# Patient Record
Sex: MALE | Race: WHITE | NOT HISPANIC OR LATINO | Employment: OTHER | URBAN - METROPOLITAN AREA
[De-identification: names, ages, dates, MRNs, and addresses within clinical notes are randomized per-mention and may not be internally consistent; named-entity substitution may affect disease eponyms.]

---

## 2017-01-10 ENCOUNTER — HOSPITAL ENCOUNTER (OUTPATIENT)
Dept: LAB | Facility: MEDICAL CENTER | Age: 77
End: 2017-01-10
Attending: FAMILY MEDICINE
Payer: MEDICARE

## 2017-01-10 ENCOUNTER — HOSPITAL ENCOUNTER (OUTPATIENT)
Dept: RADIOLOGY | Facility: MEDICAL CENTER | Age: 77
End: 2017-01-10
Attending: NURSE PRACTITIONER
Payer: MEDICARE

## 2017-01-10 DIAGNOSIS — R05.9 COUGH: ICD-10-CM

## 2017-01-10 LAB
ALBUMIN SERPL BCP-MCNC: 4.5 G/DL (ref 3.2–4.9)
ALBUMIN/GLOB SERPL: 1.6 G/DL
ALP SERPL-CCNC: 64 U/L (ref 30–99)
ALT SERPL-CCNC: 14 U/L (ref 2–50)
ANION GAP SERPL CALC-SCNC: 6 MMOL/L (ref 0–11.9)
AST SERPL-CCNC: 18 U/L (ref 12–45)
BASOPHILS # BLD AUTO: 0.05 K/UL (ref 0–0.12)
BASOPHILS NFR BLD AUTO: 0.9 % (ref 0–1.8)
BILIRUB SERPL-MCNC: 0.7 MG/DL (ref 0.1–1.5)
BUN SERPL-MCNC: 12 MG/DL (ref 8–22)
CALCIUM SERPL-MCNC: 9.4 MG/DL (ref 8.5–10.5)
CHLORIDE SERPL-SCNC: 104 MMOL/L (ref 96–112)
CO2 SERPL-SCNC: 28 MMOL/L (ref 20–33)
CREAT SERPL-MCNC: 0.93 MG/DL (ref 0.5–1.4)
DEPRECATED D DIMER PPP IA-ACNC: <200 NG/ML(D-DU)
EOSINOPHIL # BLD: 0.52 K/UL (ref 0–0.51)
EOSINOPHIL NFR BLD AUTO: 9.4 % (ref 0–6.9)
ERYTHROCYTE [DISTWIDTH] IN BLOOD BY AUTOMATED COUNT: 43.8 FL (ref 35.9–50)
ERYTHROCYTE [SEDIMENTATION RATE] IN BLOOD BY WESTERGREN METHOD: 3 MM/HOUR (ref 0–20)
GLOBULIN SER CALC-MCNC: 2.8 G/DL (ref 1.9–3.5)
GLUCOSE SERPL-MCNC: 135 MG/DL (ref 65–99)
HCT VFR BLD AUTO: 47.5 % (ref 42–52)
HGB BLD-MCNC: 15.9 G/DL (ref 14–18)
IMM GRANULOCYTES # BLD AUTO: 0.01 K/UL (ref 0–0.11)
IMM GRANULOCYTES NFR BLD AUTO: 0.2 % (ref 0–0.9)
LYMPHOCYTES # BLD: 1.6 K/UL (ref 1–4.8)
LYMPHOCYTES NFR BLD AUTO: 28.8 % (ref 22–41)
MCH RBC QN AUTO: 29.4 PG (ref 27–33)
MCHC RBC AUTO-ENTMCNC: 33.5 G/DL (ref 33.7–35.3)
MCV RBC AUTO: 88 FL (ref 81.4–97.8)
MONOCYTES # BLD: 0.22 K/UL (ref 0–0.85)
MONOCYTES NFR BLD AUTO: 4 % (ref 0–13.4)
NEUTROPHILS # BLD: 3.15 K/UL (ref 1.82–7.42)
NEUTROPHILS NFR BLD AUTO: 56.7 % (ref 44–72)
NRBC # BLD AUTO: 0 K/UL
NRBC BLD-RTO: 0 /100 WBC
PLATELET # BLD AUTO: 196 K/UL (ref 164–446)
PMV BLD AUTO: 12.1 FL (ref 9–12.9)
POTASSIUM SERPL-SCNC: 4.6 MMOL/L (ref 3.6–5.5)
PROT SERPL-MCNC: 7.3 G/DL (ref 6–8.2)
RBC # BLD AUTO: 5.4 M/UL (ref 4.7–6.1)
SODIUM SERPL-SCNC: 138 MMOL/L (ref 135–145)
WBC # BLD AUTO: 5.6 K/UL (ref 4.8–10.8)

## 2017-01-10 PROCEDURE — 71020 DX-CHEST-2 VIEWS: CPT

## 2017-01-10 PROCEDURE — 85025 COMPLETE CBC W/AUTO DIFF WBC: CPT

## 2017-01-10 PROCEDURE — 80053 COMPREHEN METABOLIC PANEL: CPT

## 2017-01-10 PROCEDURE — 36415 COLL VENOUS BLD VENIPUNCTURE: CPT

## 2017-01-10 PROCEDURE — 85652 RBC SED RATE AUTOMATED: CPT

## 2017-01-10 PROCEDURE — 85379 FIBRIN DEGRADATION QUANT: CPT

## 2017-03-24 DIAGNOSIS — Z01.812 PRE-OPERATIVE LABORATORY EXAMINATION: ICD-10-CM

## 2017-03-24 DIAGNOSIS — Z01.810 PRE-OPERATIVE CARDIOVASCULAR EXAMINATION: ICD-10-CM

## 2017-03-24 LAB
ANION GAP SERPL CALC-SCNC: 11 MMOL/L (ref 0–11.9)
BASOPHILS # BLD AUTO: 0.8 % (ref 0–1.8)
BASOPHILS # BLD: 0.05 K/UL (ref 0–0.12)
BUN SERPL-MCNC: 12 MG/DL (ref 8–22)
CALCIUM SERPL-MCNC: 9.8 MG/DL (ref 8.5–10.5)
CHLORIDE SERPL-SCNC: 104 MMOL/L (ref 96–112)
CO2 SERPL-SCNC: 27 MMOL/L (ref 20–33)
CREAT SERPL-MCNC: 0.97 MG/DL (ref 0.5–1.4)
EKG IMPRESSION: NORMAL
EOSINOPHIL # BLD AUTO: 0.44 K/UL (ref 0–0.51)
EOSINOPHIL NFR BLD: 7.1 % (ref 0–6.9)
ERYTHROCYTE [DISTWIDTH] IN BLOOD BY AUTOMATED COUNT: 41.9 FL (ref 35.9–50)
GFR SERPL CREATININE-BSD FRML MDRD: >60 ML/MIN/1.73 M 2
GLUCOSE SERPL-MCNC: 139 MG/DL (ref 65–99)
HCT VFR BLD AUTO: 47.9 % (ref 42–52)
HGB BLD-MCNC: 15.7 G/DL (ref 14–18)
IMM GRANULOCYTES # BLD AUTO: 0.01 K/UL (ref 0–0.11)
IMM GRANULOCYTES NFR BLD AUTO: 0.2 % (ref 0–0.9)
LYMPHOCYTES # BLD AUTO: 1.8 K/UL (ref 1–4.8)
LYMPHOCYTES NFR BLD: 28.8 % (ref 22–41)
MCH RBC QN AUTO: 28.5 PG (ref 27–33)
MCHC RBC AUTO-ENTMCNC: 32.8 G/DL (ref 33.7–35.3)
MCV RBC AUTO: 87.1 FL (ref 81.4–97.8)
MONOCYTES # BLD AUTO: 0.31 K/UL (ref 0–0.85)
MONOCYTES NFR BLD AUTO: 5 % (ref 0–13.4)
NEUTROPHILS # BLD AUTO: 3.63 K/UL (ref 1.82–7.42)
NEUTROPHILS NFR BLD: 58.1 % (ref 44–72)
NRBC # BLD AUTO: 0 K/UL
NRBC BLD AUTO-RTO: 0 /100 WBC
PLATELET # BLD AUTO: 208 K/UL (ref 164–446)
PMV BLD AUTO: 12.3 FL (ref 9–12.9)
POTASSIUM SERPL-SCNC: 4.5 MMOL/L (ref 3.6–5.5)
RBC # BLD AUTO: 5.5 M/UL (ref 4.7–6.1)
SODIUM SERPL-SCNC: 142 MMOL/L (ref 135–145)
WBC # BLD AUTO: 6.2 K/UL (ref 4.8–10.8)

## 2017-03-24 PROCEDURE — 80048 BASIC METABOLIC PNL TOTAL CA: CPT

## 2017-03-24 PROCEDURE — 85025 COMPLETE CBC W/AUTO DIFF WBC: CPT

## 2017-03-24 PROCEDURE — 36415 COLL VENOUS BLD VENIPUNCTURE: CPT

## 2017-03-31 ENCOUNTER — HOSPITAL ENCOUNTER (OUTPATIENT)
Facility: MEDICAL CENTER | Age: 77
End: 2017-03-31
Attending: SURGERY | Admitting: SURGERY
Payer: MEDICARE

## 2017-03-31 VITALS
BODY MASS INDEX: 30.05 KG/M2 | RESPIRATION RATE: 16 BRPM | OXYGEN SATURATION: 92 % | HEART RATE: 67 BPM | WEIGHT: 209.88 LBS | DIASTOLIC BLOOD PRESSURE: 84 MMHG | TEMPERATURE: 98 F | SYSTOLIC BLOOD PRESSURE: 147 MMHG | HEIGHT: 70 IN

## 2017-03-31 PROBLEM — K40.20 BILATERAL INGUINAL HERNIA: Status: RESOLVED | Noted: 2017-03-31 | Resolved: 2017-03-31

## 2017-03-31 PROBLEM — K40.20 BILATERAL INGUINAL HERNIA: Status: ACTIVE | Noted: 2017-03-31

## 2017-03-31 PROCEDURE — 700111 HCHG RX REV CODE 636 W/ 250 OVERRIDE (IP)

## 2017-03-31 PROCEDURE — 160028 HCHG SURGERY MINUTES - 1ST 30 MINS LEVEL 3: Performed by: SURGERY

## 2017-03-31 PROCEDURE — 160036 HCHG PACU - EA ADDL 30 MINS PHASE I: Performed by: SURGERY

## 2017-03-31 PROCEDURE — 700101 HCHG RX REV CODE 250

## 2017-03-31 PROCEDURE — 160009 HCHG ANES TIME/MIN: Performed by: SURGERY

## 2017-03-31 PROCEDURE — 110382 HCHG SHELL REV 271: Performed by: SURGERY

## 2017-03-31 PROCEDURE — 160002 HCHG RECOVERY MINUTES (STAT): Performed by: SURGERY

## 2017-03-31 PROCEDURE — A6402 STERILE GAUZE <= 16 SQ IN: HCPCS | Performed by: SURGERY

## 2017-03-31 PROCEDURE — 500888 HCHG PACK, MINOR BASIN: Performed by: SURGERY

## 2017-03-31 PROCEDURE — 160039 HCHG SURGERY MINUTES - EA ADDL 1 MIN LEVEL 3: Performed by: SURGERY

## 2017-03-31 PROCEDURE — C1781 MESH (IMPLANTABLE): HCPCS | Performed by: SURGERY

## 2017-03-31 PROCEDURE — 160048 HCHG OR STATISTICAL LEVEL 1-5: Performed by: SURGERY

## 2017-03-31 PROCEDURE — 500380 HCHG DRAIN, PENROSE 1/4X12: Performed by: SURGERY

## 2017-03-31 PROCEDURE — 110371 HCHG SHELL REV 272: Performed by: SURGERY

## 2017-03-31 PROCEDURE — A4606 OXYGEN PROBE USED W OXIMETER: HCPCS | Performed by: SURGERY

## 2017-03-31 PROCEDURE — 501838 HCHG SUTURE GENERAL: Performed by: SURGERY

## 2017-03-31 PROCEDURE — 160035 HCHG PACU - 1ST 60 MINS PHASE I: Performed by: SURGERY

## 2017-03-31 PROCEDURE — 502240 HCHG MISC OR SUPPLY RC 0272: Performed by: SURGERY

## 2017-03-31 DEVICE — MESH VENTRALEX ST W/STRAP - 4.3CM SMALL (1EA/CA): Type: IMPLANTABLE DEVICE | Site: UMBILICAL | Status: FUNCTIONAL

## 2017-03-31 DEVICE — MESH SOFT 4 X 6 (3/CA): Type: IMPLANTABLE DEVICE | Site: INGUINAL | Status: FUNCTIONAL

## 2017-03-31 RX ORDER — HYDRALAZINE HYDROCHLORIDE 20 MG/ML
INJECTION INTRAMUSCULAR; INTRAVENOUS
Status: COMPLETED
Start: 2017-03-31 | End: 2017-03-31

## 2017-03-31 RX ORDER — SODIUM CHLORIDE, SODIUM LACTATE, POTASSIUM CHLORIDE, CALCIUM CHLORIDE 600; 310; 30; 20 MG/100ML; MG/100ML; MG/100ML; MG/100ML
1000 INJECTION, SOLUTION INTRAVENOUS
Status: COMPLETED | OUTPATIENT
Start: 2017-03-31 | End: 2017-03-31

## 2017-03-31 RX ORDER — MORPHINE SULFATE 4 MG/ML
2-4 INJECTION, SOLUTION INTRAMUSCULAR; INTRAVENOUS
Status: DISCONTINUED | OUTPATIENT
Start: 2017-03-31 | End: 2017-03-31 | Stop reason: HOSPADM

## 2017-03-31 RX ORDER — OXYCODONE HYDROCHLORIDE AND ACETAMINOPHEN 5; 325 MG/1; MG/1
1-2 TABLET ORAL EVERY 4 HOURS PRN
Status: DISCONTINUED | OUTPATIENT
Start: 2017-03-31 | End: 2017-03-31 | Stop reason: HOSPADM

## 2017-03-31 RX ORDER — ONDANSETRON 2 MG/ML
2-4 INJECTION INTRAMUSCULAR; INTRAVENOUS PRN
Status: DISCONTINUED | OUTPATIENT
Start: 2017-03-31 | End: 2017-03-31 | Stop reason: HOSPADM

## 2017-03-31 RX ORDER — DEXTROSE MONOHYDRATE, SODIUM CHLORIDE, AND POTASSIUM CHLORIDE 50; 1.49; 4.5 G/1000ML; G/1000ML; G/1000ML
INJECTION, SOLUTION INTRAVENOUS CONTINUOUS
Status: DISCONTINUED | OUTPATIENT
Start: 2017-03-31 | End: 2017-03-31 | Stop reason: HOSPADM

## 2017-03-31 RX ORDER — BUPIVACAINE HYDROCHLORIDE AND EPINEPHRINE 5; 5 MG/ML; UG/ML
INJECTION, SOLUTION EPIDURAL; INTRACAUDAL; PERINEURAL
Status: DISCONTINUED | OUTPATIENT
Start: 2017-03-31 | End: 2017-03-31 | Stop reason: HOSPADM

## 2017-03-31 RX ADMIN — SODIUM CHLORIDE, SODIUM LACTATE, POTASSIUM CHLORIDE, CALCIUM CHLORIDE 1000 ML: 600; 310; 30; 20 INJECTION, SOLUTION INTRAVENOUS at 11:00

## 2017-03-31 RX ADMIN — HYDRALAZINE HYDROCHLORIDE 10 MG: 20 INJECTION INTRAMUSCULAR; INTRAVENOUS at 14:55

## 2017-03-31 ASSESSMENT — PAIN SCALES - GENERAL
PAINLEVEL_OUTOF10: 0
PAINLEVEL_OUTOF10: 0
PAINLEVEL_OUTOF10: 2
PAINLEVEL_OUTOF10: 0
PAINLEVEL_OUTOF10: 2

## 2017-03-31 NOTE — IP AVS SNAPSHOT
3/31/2017          Hamlet Del Cid  [address]  [facility] [address]    Dear Hamlet:    [facility] wants to ensure your discharge home is safe and you or your loved ones have had all your questions answered regarding your care after you leave the hospital.    You may receive a telephone call within two days of your discharge.  This call is to make certain you understand your discharge instructions as well as ensure we provided you with the best care possible during your stay with us.     The call will only last approximately 3-5 minutes and will be done by a nurse.    Once again, we want to ensure your discharge home is safe and that you have a clear understanding of any next steps in your care.  If you have any questions or concerns, please do not hesitate to contact us, we are here for you.  Thank you for choosing [facility] for your healthcare needs.    Sincerely,    Augustine Pack    [facility]

## 2017-03-31 NOTE — OR NURSING
1346 Pt arrived from OR. Report received from anesthesia, Dr Barkley. Lung sounds clear. Vitals taken and stable. No distress noted.  3 surgical sites noted with tegaderm to groin sites and gauze and tegaderm to umbilical area. Pt still sleeping.   1404 Pt resting with eyes closed. Appears comfortable Report off to Brooke CURIEL,

## 2017-03-31 NOTE — OR NURSING
1404  RECEIVED REPORT FROM CONCEPCION CURIEL.  ASSUMED CARE OF PATIENT.  PATIENT SLEEPING.  LEFT HAND TREMORS NOTED.      1415  PATIENT AWAKE.  DENIES PAIN.      1422  SPOUSE IRA CALLED AND UPDATED.    1451  DR. BOWLING CALLED.  NOTIFIED OF /93 /90.  ORDER FOR HYDRALAZINE 10 MG IV.    1455  MEDICATED WITH IV HYDRALAZINE FOR /90.    1515  SITTING IN RECLINER.    1526  WIFE IRA TO BEDSIDE.    1551  DISCHARGED.  DISCHARGE INSTRUCTIONS GIVEN TO PATIENT AND HIS WIFE.  A VERBAL UNDERSTANDING OF ALL INSTRUCTIONS WAS STATED.  PATIENT TAKING PO, VOIDING AND AMBULATING WITHOUT DIFFICULTY.  DRESSINGS TO ABDOMEN REMAIN UNCHANGED.  PATIENT STATES HE IS READY TO GO HOME.

## 2017-03-31 NOTE — IP AVS SNAPSHOT
" Home Care Instructions                                                                                                                Name:Hamlet Del Cid  Medical Record Number:[id number]  CSN: [id number]    YOB: 1940   Age: 76 y.o.  Sex: male  HT:1.778 m (5' 10\") WT: 95.2 kg (209 lb 14.1 oz)          Admit Date: 3/31/2017     Discharge Date:   Today's Date: 3/31/2017  Attending Doctor:  Michael Malin M.D.                  Allergies:  Cat hair extract                Discharge Instructions         ACTIVITY: Rest and take it easy for the first 24 hours.  A responsible adult is recommended to remain with you during that time.  It is normal to feel sleepy.  We encourage you to not do anything that requires balance, judgment or coordination.    MILD FLU-LIKE SYMPTOMS ARE NORMAL. YOU MAY EXPERIENCE GENERALIZED MUSCLE ACHES, THROAT IRRITATION, HEADACHE AND/OR SOME NAUSEA.    FOR 24 HOURS DO NOT:  Drive, operate machinery or run household appliances.  Drink beer or alcoholic beverages.   Make important decisions or sign legal documents.    SPECIAL INSTRUCTIONS: *   Activity:no lifting weight greater than 10 lbs x 6 weeks   Diet:clear liquids tonight then advance as tolerated in AM No driving for one week or while on pain medications   *SEE HERNIA INSTRUCTION SHEET*    DIET: To avoid nausea, slowly advance diet as tolerated, avoiding spicy or greasy foods for the first day.  Add more substantial food to your diet according to your physician's instructions.  Babies can be fed formula or breast milk as soon as they are hungry.  INCREASE FLUIDS AND FIBER TO AVOID CONSTIPATION.    SURGICAL DRESSING/BATHING: *MAY SHOWER TOMORROW.  SHOWERS ONLY FOR 2 WEEKS  REMOVE TEGADERM DRESSINGS IN 4 DAYS**    FOLLOW-UP APPOINTMENT:  A follow-up appointment should be arranged with your doctor in *7-10 DAYS**; call to schedule.    You should CALL YOUR PHYSICIAN if you develop:  Fever greater than 101 degrees F.  Pain not relieved " by medication, or persistent nausea or vomiting.  Excessive bleeding (blood soaking through dressing) or unexpected drainage from the wound.  Extreme redness or swelling around the incision site, drainage of pus or foul smelling drainage.  Inability to urinate or empty your bladder within 8 hours.  Problems with breathing or chest pain.    You should call 911 if you develop problems with breathing or chest pain.  If you are unable to contact your doctor or surgical center, you should go to the nearest emergency room or urgent care center.  Physician's telephone #: *DR. ZELAYA [phone]**    If any questions arise, call your doctor.  If your doctor is not available, please feel free to call the Surgical Center at [phone].  The Center is open Monday through Friday from 7AM to 7PM.  You can also call the [facility] HOTLINE open 24 hours/day, 7 days/week and speak to a nurse at [phone], or toll free at [phone].    A registered nurse may call you a few days after your surgery to see how you are doing after your procedure.    MEDICATIONS: Resume taking daily medication.  Take prescribed pain medication with food.  If no medication is prescribed, you may take non-aspirin pain medication if needed.  PAIN MEDICATION CAN BE VERY CONSTIPATING.  Take a stool softener or laxative such as senokot, pericolace, or milk of magnesia if needed.    Prescription given for *PERCOCET AND ZOFRAN**.  Last pain medication given at *____________________**.    If your physician has prescribed pain medication that includes Acetaminophen (Tylenol), do not take additional Acetaminophen (Tylenol) while taking the prescribed medication.    Depression / Suicide Risk    As you are discharged from this [facility] facility, it is important to learn how to keep safe from harming yourself.    Recognize the warning signs:  · Abrupt changes in personality, positive or negative- including increase in energy   · Giving away  possessions  · Change in eating patterns- significant weight changes-  positive or negative  · Change in sleeping patterns- unable to sleep or sleeping all the time   · Unwillingness or inability to communicate  · Depression  · Unusual sadness, discouragement and loneliness  · Talk of wanting to die  · Neglect of personal appearance   · Rebelliousness- reckless behavior  · Withdrawal from people/activities they love  · Confusion- inability to concentrate     If you or a loved one observes any of these behaviors or has concerns about self-harm, here's what you can do:  · Talk about it- your feelings and reasons for harming yourself  · Remove any means that you might use to hurt yourself (examples: pills, rope, extension cords, firearm)  · Get professional help from the community (Mental Health, Substance Abuse, psychological counseling)  · Do not be alone:Call your Safe Contact- someone whom you trust who will be there for you.  · Call your local CRISIS HOTLINE 153-0325 or 025-936-7991  · Call your local Children's Mobile Crisis Response Team [place] (853) 489-3227 or wwwSurveySnap  · Call the toll free National Suicide Prevention Hotlines   · National Suicide Prevention Lifeline 267-741-BICQ (0734)  · National Hope Line Network 800-SUICIDE (393-8513)       Medication List      Notice     You have not been prescribed any medications.            Medication Information     Above and/or attached are the medications your physician expects you to take upon discharge. Review all of your home medications and newly ordered medications with your doctor and/or pharmacist. Follow medication instructions as directed by your doctor and/or pharmacist. Please keep your medication list with you and share with your physician. Update the information when medications are discontinued, doses are changed, or new medications (including over-the-counter products) are added; and carry medication information at all times in the  event of emergency situations.        Resources     Quit Smoking / Tobacco Use:    I understand the use of any tobacco products increases my chance of suffering from future heart disease or stroke and could cause other illnesses which may shorten my life. Quitting the use of tobacco products is the single most important thing I can do to improve my health. For further information on smoking / tobacco cessation call a Toll Free Quit Line at 1-447.548.9223 (*[employer]) or 1-453.861.1918 (American Lung Association) or you can access the web based program at www.lungusa.org.    [place] Tobacco Users Help Line:  (620) 486-6421       Toll Free: 1-475.351.1227  Quit Tobacco Program [facility] (192)501-3489    Crisis Hotline:    [place] Crisis Hotline:  3-354-CMCCVUL or 1-348.494.2662    [place] Crisis Hotline:    1-350.216.7649 or 076-698-5781    Discharge Survey:   Thank you for choosing [facility]. We hope we did everything we could to make your hospital stay a pleasant one. You may be receiving a survey and we would appreciate your time and participation in answering the questions. Your input is very valuable to us in our efforts to improve our service to our patients and their families.            Signatures     My signature on this form indicates that:    1. I acknowledge receipt and understanding of these Home Care Instruction.  2. My questions regarding this information have been answered to my satisfaction.  3. I have formulated a plan with my discharge nurse to obtain my prescribed medications for home.    __________________________________      __________________________________                   Patient Signature                                 Guardian/Responsible Adult Signature      __________________________________                 __________       ________                       Nurse Signature                                               Date                 Time

## 2017-03-31 NOTE — OR SURGEON
Immediate Post-Operative Note      PreOp Diagnosis: Bilateral Inguinal and Umbilical Hernias    PostOp Diagnosis: Bilateral Direct Inguinal Hernias and 2 cm Umbilical hernia    Procedure(s):  Bilateral INGUINAL HERNIA REPAIR W/Bard soft 6x4 MESH - Wound Class: Clean  UMBILICAL HERNIA REPAIR W/ 4.3 cm Ventralex ST mesh - Wound Class: Clean    Surgeon(s):  Michael Malin M.D.    Anesthesiologist/Type of Anesthesia:GET  Anesthesiologist: Adiel Barkley M.D./General    Surgical Staff:  Circulator: Miesha Domínguez R.N.  Scrub Person: Gayla Paniagua    Specimen: None    Estimated Blood Loss: 5 cc    Findings: bilateral direct inguinal hernias and preperitoneal adipose tissue Umbilical hernia    Complications: none        3/31/2017 1:50 PM Michael Malin

## 2017-03-31 NOTE — DISCHARGE INSTRUCTIONS
ACTIVITY: Rest and take it easy for the first 24 hours.  A responsible adult is recommended to remain with you during that time.  It is normal to feel sleepy.  We encourage you to not do anything that requires balance, judgment or coordination.    MILD FLU-LIKE SYMPTOMS ARE NORMAL. YOU MAY EXPERIENCE GENERALIZED MUSCLE ACHES, THROAT IRRITATION, HEADACHE AND/OR SOME NAUSEA.    FOR 24 HOURS DO NOT:  Drive, operate machinery or run household appliances.  Drink beer or alcoholic beverages.   Make important decisions or sign legal documents.    SPECIAL INSTRUCTIONS: *   Activity:no lifting weight greater than 10 lbs x 6 weeks   Diet:clear liquids tonight then advance as tolerated in AM No driving for one week or while on pain medications   *SEE HERNIA INSTRUCTION SHEET*    DIET: To avoid nausea, slowly advance diet as tolerated, avoiding spicy or greasy foods for the first day.  Add more substantial food to your diet according to your physician's instructions.  Babies can be fed formula or breast milk as soon as they are hungry.  INCREASE FLUIDS AND FIBER TO AVOID CONSTIPATION.    SURGICAL DRESSING/BATHING: *MAY SHOWER TOMORROW.  SHOWERS ONLY FOR 2 WEEKS  REMOVE TEGADERM DRESSINGS IN 4 DAYS**    FOLLOW-UP APPOINTMENT:  A follow-up appointment should be arranged with your doctor in *7-10 DAYS**; call to schedule.    You should CALL YOUR PHYSICIAN if you develop:  Fever greater than 101 degrees F.  Pain not relieved by medication, or persistent nausea or vomiting.  Excessive bleeding (blood soaking through dressing) or unexpected drainage from the wound.  Extreme redness or swelling around the incision site, drainage of pus or foul smelling drainage.  Inability to urinate or empty your bladder within 8 hours.  Problems with breathing or chest pain.    You should call 911 if you develop problems with breathing or chest pain.  If you are unable to contact your doctor or surgical center, you should go to the nearest  emergency room or urgent care center.  Physician's telephone #: *DR. ZELAYA [phone]**    If any questions arise, call your doctor.  If your doctor is not available, please feel free to call the Surgical Center at [phone].  The Center is open Monday through Friday from 7AM to 7PM.  You can also call the HEALTH HOTLINE open 24 hours/day, 7 days/week and speak to a nurse at [phone], or toll free at [phone].    A registered nurse may call you a few days after your surgery to see how you are doing after your procedure.    MEDICATIONS: Resume taking daily medication.  Take prescribed pain medication with food.  If no medication is prescribed, you may take non-aspirin pain medication if needed.  PAIN MEDICATION CAN BE VERY CONSTIPATING.  Take a stool softener or laxative such as senokot, pericolace, or milk of magnesia if needed.    Prescription given for *PERCOCET AND ZOFRAN**.  Last pain medication given at *____________________**.    If your physician has prescribed pain medication that includes Acetaminophen (Tylenol), do not take additional Acetaminophen (Tylenol) while taking the prescribed medication.    Depression / Suicide Risk    As you are discharged from this [facility] facility, it is important to learn how to keep safe from harming yourself.    Recognize the warning signs:  · Abrupt changes in personality, positive or negative- including increase in energy   · Giving away possessions  · Change in eating patterns- significant weight changes-  positive or negative  · Change in sleeping patterns- unable to sleep or sleeping all the time   · Unwillingness or inability to communicate  · Depression  · Unusual sadness, discouragement and loneliness  · Talk of wanting to die  · Neglect of personal appearance   · Rebelliousness- reckless behavior  · Withdrawal from people/activities they love  · Confusion- inability to concentrate     If you or a loved one observes any of these behaviors or has  concerns about self-harm, here's what you can do:  · Talk about it- your feelings and reasons for harming yourself  · Remove any means that you might use to hurt yourself (examples: pills, rope, extension cords, firearm)  · Get professional help from the community (Mental Health, Substance Abuse, psychological counseling)  · Do not be alone:Call your Safe Contact- someone whom you trust who will be there for you.  · Call your local CRISIS HOTLINE 908-2977 or 519-717-2363  · Call your local Children's Mobile Crisis Response Team [place] (519) 569-0716 or www."[employer]  · Call the toll free National Suicide Prevention Hotlines   · National Suicide Prevention Lifeline 448-395-FLME (1884)  · National Hope Line Network 800-SUICIDE (612-8539)

## 2017-04-01 NOTE — OP REPORT
DATE OF SERVICE:  03/31/2017    PREOPERATIVE DIAGNOSES:  Bilateral inguinal hernias and umbilical hernia.    POSTOPERATIVE DIAGNOSIS:  Bilateral direct inguinal hernias and 2 cm   preperitoneal fat umbilical hernia defect.    TYPE OF ANESTHESIA AND ANESTHESIOLOGIST:  General endotracheal by Dr. Barkley.    SURGEON:  Michael Malin MD.    INDICATIONS:  Patient with symptomatic bilateral inguinal hernias and   umbilical hernia.    OPERATIVE FINDINGS:  Bilateral direct inguinal hernias and a 2 cm fascial   defect of the umbilicus.    OPERATIVE NOTE:  The patient was taken to the operating room, placed in supine   position.  Starting on the right side, the right inguinal canal was   anesthetized with 0.5% Marcaine with epinephrine and then an oblique incision   was made through the anesthetized area, carried down through the skin and   subcutaneous tissue.  The external oblique fascia was exposed, opened in   direction of its fibers.  The shelving edges were developed bluntly.  The   spermatic cord was brought up in the operative field and digitally   manipulated, and then surrounded with Penrose drains, skeletonized.  No   indirect sac was encountered.  There was a direct inguinal hernia that was   reduced.  The transversalis was reapproximated with interrupted 3-0 Vicryls to   keep the sac out of the way while the 4x6 piece of Bard soft mesh was placed   into the inguinal canal cut to the appropriate size and shape, secured to the   pubic tubercle with a horizontal mattress suture, running it laterally along   the ilioinguinal ligament, making a transition onto the internal oblique where   it was tied.  The tails were sutured together with interrupted 2-0 PDS and   then starting around the midpoint on the medial edge the mesh was secured back   to the fascia with a running 2-0 PDS where it was tied back at the pubic   tubercle.  There was adequate room for the cord to go through the mesh.  The   external oblique fascia  was closed back over the cord and mesh with a running   2-0 Vicryl.  The subcutaneous tissues reapproximated with 3-0 Vicryls and the   skin was closed with a 4-0 Vicryl subcuticular closure.    A mirror incision was made on the left side in the same manner after the skin   had been anesthetized with 0.5% Marcaine with epinephrine.  The incision was   carried down through the skin and subcutaneous tissue.  The external oblique   fascia was again opened in the direction of its fibers.  The shelving edges   were developed bluntly.  The spermatic cord was digitally manipulated and   brought up in the operative field.  Again, there was a direct inguinal hernia.    There was no indirect hernia.  The direct hernia was reduced.  The   transversalis was again reapproximated with 3-0 Vicryl pops to keep the hernia   from bulging into the operative field while the definitive repair was   conducted again by placing a 6.6 x 4 piece of Bard soft mesh in the inguinal   canal, securing it into place at the pubic tubercle running it laterally along   the ilioinguinal ligament, making transition stitch onto the internal oblique   where it was tied.  The tails were sutured together with an interrupted 2-0   PDS suture.  A new running 2-0 PDS suture was started at the midpoint of the   medial edge of the mesh and carried back down to the pubic tubercle where it   was sewn and tied back to the original stitch.  There was again adequate room   for the cord.  The external oblique fascia was closed back over the cord and   mesh with a running 2-0 Vicryl.  The subcutaneous tissues repair   reapproximated with 3-0 Vicryls and the skin was closed with 4-0 Vicryl   subcuticular closure.    The umbilical hernia was repaired by making a small incision infraumbilically   in a transverse fashion and carrying it down through the skin and subcutaneous   tissue.  The fascia of the abdominal wall was encountered.  The umbilical   stalk was amputated  at the base of the fascia and dissected out.  The hernia   was reduced and there was an approximately 2 cm defect containing   preperitoneal fat.  This was reduced.  The tear in the peritoneum was repaired   with 3-0 Vicryls and then a pocket was created in the preperitoneal space and   a piece of mesh, 4.3 cm was placed into the pocket secured in four points   with a horizontal mattress stitch with 0 Prolenes and then the fascia was   closed back over the mesh with interrupted 0 Prolenes.  The umbilical stalk   was tacked back down to the fascia with a 3-0 Vicryl.  The subcutaneous   tissues reapproximated with 3-0 Vicryls and the skin was closed with a 4-0   Vicryl subcuticular closure.  Patient tolerated the procedure well.  There   were no apparent complications.  Lap, sponge, and instrument counts were   correct.       ____________________________________     MD CARLOS GEIGER / DAVE    DD:  03/31/2017 14:01:35  DT:  03/31/2017 17:41:49    D#:  [id number]  Job#:  [id number]

## 2017-10-05 PROCEDURE — 99285 EMERGENCY DEPT VISIT HI MDM: CPT

## 2017-10-05 ASSESSMENT — PAIN SCALES - GENERAL: PAINLEVEL_OUTOF10: 0

## 2017-10-06 ENCOUNTER — APPOINTMENT (OUTPATIENT)
Dept: RADIOLOGY | Facility: MEDICAL CENTER | Age: 77
End: 2017-10-06
Attending: EMERGENCY MEDICINE
Payer: MEDICARE

## 2017-10-06 ENCOUNTER — HOSPITAL ENCOUNTER (EMERGENCY)
Facility: MEDICAL CENTER | Age: 77
End: 2017-10-06
Attending: EMERGENCY MEDICINE
Payer: MEDICARE

## 2017-10-06 VITALS
HEIGHT: 71 IN | WEIGHT: 206.35 LBS | SYSTOLIC BLOOD PRESSURE: 139 MMHG | RESPIRATION RATE: 17 BRPM | DIASTOLIC BLOOD PRESSURE: 79 MMHG | TEMPERATURE: 98.3 F | BODY MASS INDEX: 28.89 KG/M2 | HEART RATE: 72 BPM | OXYGEN SATURATION: 94 %

## 2017-10-06 DIAGNOSIS — I48.0 PAF (PAROXYSMAL ATRIAL FIBRILLATION) (HCC): ICD-10-CM

## 2017-10-06 LAB
ALBUMIN SERPL BCP-MCNC: 4 G/DL (ref 3.2–4.9)
ALBUMIN/GLOB SERPL: 1.5 G/DL
ALP SERPL-CCNC: 91 U/L (ref 30–99)
ALT SERPL-CCNC: 20 U/L (ref 2–50)
ANION GAP SERPL CALC-SCNC: 8 MMOL/L (ref 0–11.9)
APTT PPP: 27.5 SEC (ref 24.7–36)
AST SERPL-CCNC: 24 U/L (ref 12–45)
BASOPHILS # BLD AUTO: 0.8 % (ref 0–1.8)
BASOPHILS # BLD: 0.05 K/UL (ref 0–0.12)
BILIRUB SERPL-MCNC: 0.8 MG/DL (ref 0.1–1.5)
BUN SERPL-MCNC: 11 MG/DL (ref 8–22)
CALCIUM SERPL-MCNC: 9.3 MG/DL (ref 8.4–10.2)
CHLORIDE SERPL-SCNC: 106 MMOL/L (ref 96–112)
CO2 SERPL-SCNC: 24 MMOL/L (ref 20–33)
CREAT SERPL-MCNC: 0.91 MG/DL (ref 0.5–1.4)
EKG IMPRESSION: NORMAL
EKG IMPRESSION: NORMAL
EOSINOPHIL # BLD AUTO: 0.45 K/UL (ref 0–0.51)
EOSINOPHIL NFR BLD: 6.8 % (ref 0–6.9)
ERYTHROCYTE [DISTWIDTH] IN BLOOD BY AUTOMATED COUNT: 42.5 FL (ref 35.9–50)
GFR SERPL CREATININE-BSD FRML MDRD: >60 ML/MIN/1.73 M 2
GLOBULIN SER CALC-MCNC: 2.7 G/DL (ref 1.9–3.5)
GLUCOSE SERPL-MCNC: 159 MG/DL (ref 65–99)
HCT VFR BLD AUTO: 46.7 % (ref 42–52)
HGB BLD-MCNC: 15.8 G/DL (ref 14–18)
IMM GRANULOCYTES # BLD AUTO: 0.02 K/UL (ref 0–0.11)
IMM GRANULOCYTES NFR BLD AUTO: 0.3 % (ref 0–0.9)
INR PPP: 0.91 (ref 0.87–1.13)
LYMPHOCYTES # BLD AUTO: 2.05 K/UL (ref 1–4.8)
LYMPHOCYTES NFR BLD: 30.9 % (ref 22–41)
MAGNESIUM SERPL-MCNC: 2.2 MG/DL (ref 1.5–2.5)
MCH RBC QN AUTO: 29 PG (ref 27–33)
MCHC RBC AUTO-ENTMCNC: 33.8 G/DL (ref 33.7–35.3)
MCV RBC AUTO: 85.8 FL (ref 81.4–97.8)
MONOCYTES # BLD AUTO: 0.47 K/UL (ref 0–0.85)
MONOCYTES NFR BLD AUTO: 7.1 % (ref 0–13.4)
NEUTROPHILS # BLD AUTO: 3.59 K/UL (ref 1.82–7.42)
NEUTROPHILS NFR BLD: 54.1 % (ref 44–72)
NRBC # BLD AUTO: 0 K/UL
NRBC BLD AUTO-RTO: 0 /100 WBC
PLATELET # BLD AUTO: 163 K/UL (ref 164–446)
PMV BLD AUTO: 11 FL (ref 9–12.9)
POTASSIUM SERPL-SCNC: 4.3 MMOL/L (ref 3.6–5.5)
PROT SERPL-MCNC: 6.7 G/DL (ref 6–8.2)
PROTHROMBIN TIME: 12.1 SEC (ref 12–14.6)
RBC # BLD AUTO: 5.44 M/UL (ref 4.7–6.1)
SODIUM SERPL-SCNC: 138 MMOL/L (ref 135–145)
T4 FREE SERPL-MCNC: 0.7 NG/DL (ref 0.58–1.64)
TSH SERPL DL<=0.005 MIU/L-ACNC: 2.64 UIU/ML (ref 0.35–5.5)
WBC # BLD AUTO: 6.6 K/UL (ref 4.8–10.8)

## 2017-10-06 PROCEDURE — 96374 THER/PROPH/DIAG INJ IV PUSH: CPT | Mod: XU

## 2017-10-06 PROCEDURE — 700102 HCHG RX REV CODE 250 W/ 637 OVERRIDE(OP): Performed by: EMERGENCY MEDICINE

## 2017-10-06 PROCEDURE — 85025 COMPLETE CBC W/AUTO DIFF WBC: CPT

## 2017-10-06 PROCEDURE — 93005 ELECTROCARDIOGRAM TRACING: CPT | Mod: XU | Performed by: EMERGENCY MEDICINE

## 2017-10-06 PROCEDURE — 85610 PROTHROMBIN TIME: CPT

## 2017-10-06 PROCEDURE — 36415 COLL VENOUS BLD VENIPUNCTURE: CPT

## 2017-10-06 PROCEDURE — 84443 ASSAY THYROID STIM HORMONE: CPT

## 2017-10-06 PROCEDURE — 83735 ASSAY OF MAGNESIUM: CPT

## 2017-10-06 PROCEDURE — 80053 COMPREHEN METABOLIC PANEL: CPT

## 2017-10-06 PROCEDURE — 84439 ASSAY OF FREE THYROXINE: CPT

## 2017-10-06 PROCEDURE — 99152 MOD SED SAME PHYS/QHP 5/>YRS: CPT

## 2017-10-06 PROCEDURE — 700111 HCHG RX REV CODE 636 W/ 250 OVERRIDE (IP): Performed by: EMERGENCY MEDICINE

## 2017-10-06 PROCEDURE — 85730 THROMBOPLASTIN TIME PARTIAL: CPT

## 2017-10-06 PROCEDURE — A9270 NON-COVERED ITEM OR SERVICE: HCPCS | Performed by: EMERGENCY MEDICINE

## 2017-10-06 PROCEDURE — 92960 CARDIOVERSION ELECTRIC EXT: CPT

## 2017-10-06 PROCEDURE — 700105 HCHG RX REV CODE 258: Performed by: EMERGENCY MEDICINE

## 2017-10-06 PROCEDURE — 304562 HCHG STAT O2 MASK/CANNULA

## 2017-10-06 PROCEDURE — 304561 HCHG STAT O2

## 2017-10-06 PROCEDURE — 71010 DX-CHEST-PORTABLE (1 VIEW): CPT

## 2017-10-06 RX ORDER — ONDANSETRON 2 MG/ML
4 INJECTION INTRAMUSCULAR; INTRAVENOUS ONCE
Status: COMPLETED | OUTPATIENT
Start: 2017-10-06 | End: 2017-10-06

## 2017-10-06 RX ORDER — SODIUM CHLORIDE 9 MG/ML
500 INJECTION, SOLUTION INTRAVENOUS ONCE
Status: COMPLETED | OUTPATIENT
Start: 2017-10-06 | End: 2017-10-06

## 2017-10-06 RX ORDER — ASPIRIN 325 MG
325 TABLET ORAL ONCE
Status: COMPLETED | OUTPATIENT
Start: 2017-10-06 | End: 2017-10-06

## 2017-10-06 RX ORDER — ETOMIDATE 2 MG/ML
10 INJECTION INTRAVENOUS ONCE
Status: COMPLETED | OUTPATIENT
Start: 2017-10-06 | End: 2017-10-06

## 2017-10-06 RX ORDER — ASPIRIN 81 MG/1
81 TABLET, CHEWABLE ORAL DAILY
Qty: 30 TAB | Refills: 0 | Status: SHIPPED | OUTPATIENT
Start: 2017-10-06 | End: 2019-07-22

## 2017-10-06 RX ADMIN — ONDANSETRON 4 MG: 2 INJECTION INTRAMUSCULAR; INTRAVENOUS at 01:13

## 2017-10-06 RX ADMIN — SODIUM CHLORIDE 500 ML: 9 INJECTION, SOLUTION INTRAVENOUS at 01:12

## 2017-10-06 RX ADMIN — ASPIRIN 325 MG ORAL TABLET 325 MG: 325 PILL ORAL at 01:58

## 2017-10-06 RX ADMIN — ETOMIDATE 10 MG: 2 INJECTION, SOLUTION INTRAVENOUS at 01:15

## 2017-10-06 NOTE — DISCHARGE INSTRUCTIONS
Atrial Fibrillation  Atrial fibrillation is a type of irregular heart rhythm (arrhythmia). During atrial fibrillation, the upper chambers of the heart (atria) quiver continuously in a chaotic pattern. This causes an irregular and often rapid heart rate.   Atrial fibrillation is the result of the heart becoming overloaded with disorganized signals that tell it to beat. These signals are normally released one at a time by a part of the right atrium called the sinoatrial node. They then travel from the atria to the lower chambers of the heart (ventricles), causing the atria and ventricles to contract and pump blood as they pass. In atrial fibrillation, parts of the atria outside of the sinoatrial node also release these signals. This results in two problems. First, the atria receive so many signals that they do not have time to fully contract. Second, the ventricles, which can only receive one signal at a time, beat irregularly and out of rhythm with the atria.   There are three types of atrial fibrillation:   · Paroxysmal. Paroxysmal atrial fibrillation starts suddenly and stops on its own within a week.  · Persistent. Persistent atrial fibrillation lasts for more than a week. It may stop on its own or with treatment.  · Permanent. Permanent atrial fibrillation does not go away. Episodes of atrial fibrillation may lead to permanent atrial fibrillation.  Atrial fibrillation can prevent your heart from pumping blood normally. It increases your risk of stroke and can lead to heart failure.   CAUSES   · Heart conditions, including a heart attack, heart failure, coronary artery disease, and heart valve conditions.    · Inflammation of the sac that surrounds the heart (pericarditis).  · Blockage of an artery in the lungs (pulmonary embolism).  · Pneumonia or other infections.  · Chronic lung disease.  · Thyroid problems, especially if the thyroid is overactive (hyperthyroidism).  · Caffeine, excessive alcohol use, and use  of some illegal drugs.    · Use of some medicines, including certain decongestants and diet pills.  · Heart surgery.    · Birth defects.    Sometimes, no cause can be found. When this happens, the atrial fibrillation is called lone atrial fibrillation. The risk of complications from atrial fibrillation increases if you have lone atrial fibrillation and you are age 60 years or older.  RISK FACTORS  · Heart failure.  · Coronary artery disease.  · Diabetes mellitus.    · High blood pressure (hypertension).    · Obesity.    · Other arrhythmias.    · Increased age.  SIGNS AND SYMPTOMS   · A feeling that your heart is beating rapidly or irregularly.    · A feeling of discomfort or pain in your chest.    · Shortness of breath.    · Sudden light-headedness or weakness.    · Getting tired easily when exercising.    · Urinating more often than normal (mainly when atrial fibrillation first begins).    In paroxysmal atrial fibrillation, symptoms may start and suddenly stop.  DIAGNOSIS   Your health care provider may be able to detect atrial fibrillation when taking your pulse. Your health care provider may have you take a test called an ambulatory electrocardiogram (ECG). An ECG records your heartbeat patterns over a 24-hour period. You may also have other tests, such as:  · Transthoracic echocardiogram (TTE). During echocardiography, sound waves are used to evaluate how blood flows through your heart.  · Transesophageal echocardiogram (HASMUKH).  · Stress test. There is more than one type of stress test. If a stress test is needed, ask your health care provider about which type is best for you.  · Chest X-ray exam.  · Blood tests.  · Computed tomography (CT).  TREATMENT   Treatment may include:  · Treating any underlying conditions. For example, if you have an overactive thyroid, treating the condition may correct atrial fibrillation.  · Taking medicine. Medicines may be given to control a rapid heart rate or to prevent blood  clots, heart failure, or a stroke.  · Having a procedure to correct the rhythm of the heart:  ¨ Electrical cardioversion. During electrical cardioversion, a controlled, low-energy shock is delivered to the heart through your skin. If you have chest pain, very low blood pressure, or sudden heart failure, this procedure may need to be done as an emergency.  ¨ Catheter ablation. During this procedure, heart tissues that send the signals that cause atrial fibrillation are destroyed.  ¨ Surgical ablation. During this surgery, thin lines of heart tissue that carry the abnormal signals are destroyed. This procedure can either be an open-heart surgery or a minimally invasive surgery. With the minimally invasive surgery, small cuts are made to access the heart instead of a large opening.  ¨ Pulmonary venous isolation. During this surgery, tissue around the veins that carry blood from the lungs (pulmonary veins) is destroyed. This tissue is thought to carry the abnormal signals.  HOME CARE INSTRUCTIONS   · Take medicines only as directed by your health care provider. Some medicines can make atrial fibrillation worse or recur.  · If blood thinners were prescribed by your health care provider, take them exactly as directed. Too much blood-thinning medicine can cause bleeding. If you take too little, you will not have the needed protection against stroke and other problems.  · Perform blood tests at home if directed by your health care provider. Perform blood tests exactly as directed.  · Quit smoking if you smoke.  · Do not drink alcohol.  · Do not drink caffeinated beverages such as coffee, soda, and some teas. You may drink decaffeinated coffee, soda, or tea.    · Maintain a healthy weight. Do not use diet pills unless your health care provider approves. They may make heart problems worse.    · Follow diet instructions as directed by your health care provider.  · Exercise regularly as directed by your health care  provider.  · Keep all follow-up visits as directed by your health care provider. This is important.  PREVENTION   The following substances can cause atrial fibrillation to recur:   · Caffeinated beverages.  · Alcohol.  · Certain medicines, especially those used for breathing problems.  · Certain herbs and herbal medicines, such as those containing ephedra or ginseng.  · Illegal drugs, such as cocaine and amphetamines.  Sometimes medicines are given to prevent atrial fibrillation from recurring. Proper treatment of any underlying condition is also important in helping prevent recurrence.   SEEK MEDICAL CARE IF:  · You notice a change in the rate, rhythm, or strength of your heartbeat.  · You suddenly begin urinating more frequently.  · You tire more easily when exerting yourself or exercising.  SEEK IMMEDIATE MEDICAL CARE IF:   · You have chest pain, abdominal pain, sweating, or weakness.  · You feel nauseous.  · You have shortness of breath.  · You suddenly have swollen feet and ankles.  · You feel dizzy.  · Your face or limbs feel numb or weak.  · You have a change in your vision or speech.  MAKE SURE YOU:   · Understand these instructions.  · Will watch your condition.  · Will get help right away if you are not doing well or get worse.     This information is not intended to replace advice given to you by your health care provider. Make sure you discuss any questions you have with your health care provider.     Document Released: 12/18/2006 Document Revised: 01/08/2016 Document Reviewed: 04/13/2016  [employer] Patient Education ©2016 [employer]        Aspirin and Your Heart   Aspirin is a medicine that affects the way blood clots. Aspirin can be used to help reduce the risk of blood clots, heart attacks, and other heart-related problems.   SHOULD I TAKE ASPIRIN?  Your health care provider will help you determine whether it is safe and beneficial for you to take aspirin daily. Taking aspirin daily may  be beneficial if you:  · Have had a heart attack or chest pain.  · Have undergone open heart surgery such as coronary artery bypass surgery (CABG).  · Have had coronary angioplasty.  · Have experienced a stroke or transient ischemic attack (TIA).  · Have peripheral vascular disease (PVD).  · Have chronic heart rhythm problems such as atrial fibrillation.  ARE THERE ANY RISKS OF TAKING ASPIRIN DAILY?  Daily use of aspirin can increase your risk of side effects. Some of these include:  · Bleeding. Bleeding problems can be minor or serious. An example of a minor problem is a cut that does not stop bleeding. An example of a more serious problem is stomach bleeding or bleeding into the brain. Your risk of bleeding is increased if you are also taking non-steroidal anti-inflammatory medicine (NSAIDs).  · Increased bruising.  · Upset stomach.  · An allergic reaction. People who have nasal polyps have an increased risk of developing an aspirin allergy.  WHAT ARE SOME GUIDELINES I SHOULD FOLLOW WHEN TAKING ASPIRIN?   · Take aspirin only as directed by your health care provider. Make sure you understand how much you should take and what form you should take. The two forms of aspirin are:  ¨ Non-enteric-coated. This type of aspirin does not have a coating and is absorbed quickly. Non-enteric-coated aspirin is usually recommended for people with chest pain. This type of aspirin also comes in a chewable form.  ¨ Enteric-coated. This type of aspirin has a special coating that releases the medicine very slowly. Enteric-coated aspirin causes less stomach upset than non-enteric-coated aspirin. This type of aspirin should not be chewed or crushed.  · Drink alcohol in moderation. Drinking alcohol increases your risk of bleeding.  WHEN SHOULD I SEEK MEDICAL CARE?   · You have unusual bleeding or bruising.  · You have stomach pain.  · You have an allergic reaction. Symptoms of an allergic reaction include:  ¨ Hives.  ¨ Itchy  skin.  ¨ Swelling of the lips, tongue, or face.  · You have ringing in your ears.  WHEN SHOULD I SEEK IMMEDIATE MEDICAL CARE?   · Your bowel movements are bloody, dark red, or black in color.  · You vomit or cough up blood.  · You have blood in your urine.  · You cough, wheeze, or feel short of breath.  If you have any of the following symptoms, this is an emergency. Do not wait to see if the pain will go away. Get medical help at once. Call your local emergency services (911 in the [place] Do not drive yourself to the hospital.  · You have severe chest pain, especially if the pain is crushing or pressure-like and spreads to the arms, back, neck, or jaw.   · You have stroke-like symptoms, such as:    ¨ Loss of vision.    ¨ Difficulty talking.    ¨ Numbness or weakness on one side of your body.    ¨ Numbness or weakness in your arm or leg.    ¨ Not thinking clearly or feeling confused.       This information is not intended to replace advice given to you by your health care provider. Make sure you discuss any questions you have with your health care provider.     Document Released: 11/30/2009 Document Revised: 01/08/2016 Document Reviewed: 03/25/2015  [employer] Interactive Patient Education ©2016 [employer]

## 2017-10-06 NOTE — ED PROVIDER NOTES
CHIEF COMPLAINT  Chief Complaint   Patient presents with   • Irregular Heart Beat       hospitals  Hamlet Del Cid is a 76 y.o. male who presents with palpitations. Has a prior history of paroxysmal atrial fibrillation. He is not on chronic anticoagulation as he typically is cardioverted. He reports his last episode was just over 4 years ago. Denies chest pain or shortness of breath. No prior history of PE. No prior history of thyroid dysfunction.    The patient does not have a regular cardiologist with whom he follows. Denies recent illness or fevers. He reports he feels in his usual state of health other than palpitations. Had a clear onset of symptoms at approximately 2 hours prior to arrival. He states that he was getting up from a chair when all of a sudden he felt an abnormal rhythm in his heart. He recognizes as atrial fibrillation similar to prior episodes and reported to the emergency department promptly afterwards.    REVIEW OF SYSTEMS  See hospitals for further details. All other systems are negative.     PAST MEDICAL HISTORY   has a past medical history of Bronchitis (); Cancer (CMS-HCC) (2003); PAF (paroxysmal atrial fibrillation) (CMS-HCC); Parkinson disease (CMS-HCC); Pneumonia (); and SVT (supraventricular tachycardia) (CMS-HCC).    SOCIAL HISTORY  Social History     Social History Main Topics   • Smoking status: Former Smoker     Years: 20.00     Types: Cigarettes     Quit date: 1/1/1987   • Smokeless tobacco: Never Used   • Alcohol use 0.0 oz/week      Comment: 1 per day   • Drug use: No   • Sexual activity: Not on file       SURGICAL HISTORY   has a past surgical history that includes other orthopedic surgery (Right, 1995); inguinal hernia repair (Left, 1990's); other cardiac surgery; hip arthroplasty total (Right, 2002); femur orif (Left, 1961); neck dissection (2003); inguinal hernia repair (Bilateral, 3/31/2017); and umbilical hernia repair (N/A, 3/31/2017).    CURRENT MEDICATIONS  Home  "Medications     Reviewed by Satish Green R.N. (Registered Nurse) on 10/05/17 at 2256  Med List Status: Not Addressed   Medication Last Dose Status   carbidopa-levodopa (SINEMET)  MG Tab  Active                ALLERGIES  Allergies   Allergen Reactions   • Cat Hair Extract Shortness of Breath     \"wheezing\"       PHYSICAL EXAM  VITAL SIGNS: /79   Pulse (!) 115   Temp 36.8 °C (98.3 °F)   Resp 16   Ht 1.803 m (5' 11\")   Wt 93.6 kg (206 lb 5.6 oz)   BMI 28.78 kg/m²   Pulse ox interpretation: 98% on room air, I interpret this pulse ox as normal.  Constitutional: Alert in no apparent distress.  HENT: No signs of trauma, Bilateral external ears normal, Nose normal.   Eyes: Pupils are equal and reactive, Conjunctiva normal, Non-icteric.   Neck: Normal range of motion, No tenderness, Supple, No stridor.   Cardiovascular: Irregularly irregular tachycardic rate and rhythm, no murmurs.   Thorax & Lungs: Normal breath sounds, No respiratory distress, No wheezing, No chest tenderness.   Abdomen: Bowel sounds normal, Soft, No tenderness, No masses, No pulsatile masses. No peritoneal signs.  Skin: Warm, Dry, No erythema, No rash.   Back: No bony tenderness, No CVA tenderness.   Extremities: Intact distal pulses, No edema, No tenderness, No cyanosis  Neurologic: Alert , Normal motor function and gait, Normal sensory function, No focal deficits noted.       DIAGNOSTIC STUDIES / PROCEDURES    EKG  10/6/2017 at 1209  Atrial fibrillation with rapid ventricular rate at 144  QRS 86    Normal axis  No ST elevations or depressions      LABS  Labs Reviewed   CBC WITH DIFFERENTIAL - Abnormal; Notable for the following:        Result Value    Platelet Count 163 (*)     All other components within normal limits    Narrative:     Indicate which anticoagulants the patient is on:->NONE   COMP METABOLIC PANEL - Abnormal; Notable for the following:     Glucose 159 (*)     All other components within normal limits    " Narrative:     Indicate which anticoagulants the patient is on:->NONE   TSH    Narrative:     Indicate which anticoagulants the patient is on:->NONE   PROTHROMBIN TIME    Narrative:     Indicate which anticoagulants the patient is on:->NONE   APTT    Narrative:     Indicate which anticoagulants the patient is on:->NONE   MAGNESIUM    Narrative:     Indicate which anticoagulants the patient is on:->NONE   FREE THYROXINE    Narrative:     Indicate which anticoagulants the patient is on:->NONE   ESTIMATED GFR    Narrative:     Indicate which anticoagulants the patient is on:->NONE       RADIOLOGY  DX-CHEST-PORTABLE (1 VIEW)   Final Result      No acute cardiopulmonary findings.          Cardioversion Procedure Note    Indication: atrial fibrillation    Consent: The patient was counseled regarding the procedure, its indications, risks, potential complications and alternatives, and any questions were answered. Consent was obtained to proceed.    Pre-Medication: etomidate intravenously    Procedure: The patient was placed in the supine position and the chest area was exposed. The cardioversion pads were applied in the standard manner and configuration.    Attempt #1: The defibrillator was set on the synchronous mode and charged to 200 joules.  A charge was then delivered which resulted in conversion to normal sinus rhythm.    Attempt #2: Not necessary    Attempt #3: Not necessary    The patient tolerated the procedure well.    Complications: None        Conscious Sedation Procedure Note    Indication: cardioversion    Consent: I have discussed with the patient and/or the patient representative the indication, alternatives, and the possible risks and/or complications of the planned procedure and the anesthesia methods. The patient and/or patient representative appear to understand and agree to proceed.    Physician Involvement: The attending physician was present and supervising this procedure.    Pre-Sedation  Documentation and Exam: I have personally completed a history, physical exam & review of systems for this patient (see notes).  Vital signs have been reviewed (see flow sheet for vitals).  I have reviewed the patient's history and review of systems.  Airway Assessment: normal, normal neck range of motion, Mallampati Class II - (soft palate, fauces & uvula are visible)    Prior History of Anesthesia Complications: none    ASA Classification: Class 1 - A normal healthy patient    Sedation/ Anesthesia Plan: intravenous sedation    Medications Used: etomidate intravenously    Monitoring and Safety: The patient was placed on a cardiac monitor and vital signs, pulse oximetry and level of consciousness were continuously evaluated throughout the procedure. The patient was closely monitored until recovery from the medications was complete and the patient had returned to baseline status. Respiratory therapy was on standby at all times during the procedure.    (The following sections must be completed)  Post-Sedation Vital Signs: Vital signs were reviewed and were stable after the procedure (see flow sheet for vitals)            Post-Sedation Exam: Lungs: clear to auscultation bilaterally and no crackles or wheezing and Cardiovascular: regular rate and rhythm           Complications: none      COURSE & MEDICAL DECISION MAKING  Pertinent Labs & Imaging studies reviewed. (See chart for details)  76 y.o. Male presenting with sudden onset palpitations with irregularly irregular rhythm. Consistent with prior episodes of atrial fibrillation. He reports paroxysmal atrial fibrillation history with cardioversion every 4-5 years approximately 5 times altogether. He denies any chest pain or shortness of breath at this time. He reports that the onset of palpitations started when he stood up earlier today. No history of thyroid issues. No risk factors or symptoms to suggest pulmonary embolism.    Patient does not have a cardiologist at  "this time.      Risks and benefits of electrical cardioversion were discussed with the patient. He states that he has never been converted without a left electrical cardioversion. I discussed the risks of stroke to this patient. He reports understanding the risks. Given that the patient has a very clear onset of symptoms just 2 hours prior to arrival, he appears to be within 48 hours of onset. I do believe it is reasonable to attempt electrocardioversion at this time. The patient has a CHADS VASC score of 1 given his age. Will treat with antiplatelet therapy. Given a dose of aspirin here and instructed to take aspirin at home until evaluated by cardiology.    The patient will return for worsening symptoms or failure of improvement and is stable at the time of discharge. The patient verbalizes understanding in their own words.    /79   Pulse 72   Temp 36.8 °C (98.3 °F)   Resp 17   Ht 1.803 m (5' 11\")   Wt 93.6 kg (206 lb 5.6 oz)   SpO2 94%   BMI 28.78 kg/m²     The patient was referred to primary care where they will receive further BP management.      Sonja Zhang M.D.  [address]  [address]  [facility] [id number] [phone]    Schedule an appointment as soon as possible for a visit      [facility], Emergency Dept  [address]  [facility] [address] [phone]    As needed, If symptoms worsen    Sean GARCIA M.D.  [address]  [place] [address] [phone]    In 2 days        FINAL IMPRESSION  1. PAF (paroxysmal atrial fibrillation) (CMS-[facility])            Electronically signed by: Dimitry Ferraro, 10/6/2017 12:08 AM    "

## 2017-10-06 NOTE — ED NOTES
"Hx of a-fib.  \"Last episode Aug 13th, 2013\".   Not on blood thinners.  Time of onset as per patient : 2145.  "

## 2017-10-06 NOTE — ED NOTES
ERP in to discuss the cardioversion process with the patient.  Questions were addressed and answered by ERP.  Patient signing consent for synchronized cardioversion.

## 2018-09-21 ENCOUNTER — APPOINTMENT (RX ONLY)
Dept: URBAN - METROPOLITAN AREA CLINIC 20 | Facility: CLINIC | Age: 78
Setting detail: DERMATOLOGY
End: 2018-09-21

## 2018-09-21 DIAGNOSIS — L82.1 OTHER SEBORRHEIC KERATOSIS: ICD-10-CM

## 2018-09-21 DIAGNOSIS — D18.0 HEMANGIOMA: ICD-10-CM

## 2018-09-21 DIAGNOSIS — L57.0 ACTINIC KERATOSIS: ICD-10-CM

## 2018-09-21 DIAGNOSIS — Z71.89 OTHER SPECIFIED COUNSELING: ICD-10-CM

## 2018-09-21 DIAGNOSIS — D22 MELANOCYTIC NEVI: ICD-10-CM

## 2018-09-21 DIAGNOSIS — L81.4 OTHER MELANIN HYPERPIGMENTATION: ICD-10-CM

## 2018-09-21 PROBLEM — D22.71 MELANOCYTIC NEVI OF RIGHT LOWER LIMB, INCLUDING HIP: Status: ACTIVE | Noted: 2018-09-21

## 2018-09-21 PROBLEM — D22.61 MELANOCYTIC NEVI OF RIGHT UPPER LIMB, INCLUDING SHOULDER: Status: ACTIVE | Noted: 2018-09-21

## 2018-09-21 PROBLEM — Z85.828 PERSONAL HISTORY OF OTHER MALIGNANT NEOPLASM OF SKIN: Status: ACTIVE | Noted: 2018-09-21

## 2018-09-21 PROBLEM — D22.39 MELANOCYTIC NEVI OF OTHER PARTS OF FACE: Status: ACTIVE | Noted: 2018-09-21

## 2018-09-21 PROBLEM — D18.01 HEMANGIOMA OF SKIN AND SUBCUTANEOUS TISSUE: Status: ACTIVE | Noted: 2018-09-21

## 2018-09-21 PROBLEM — D22.5 MELANOCYTIC NEVI OF TRUNK: Status: ACTIVE | Noted: 2018-09-21

## 2018-09-21 PROBLEM — D22.62 MELANOCYTIC NEVI OF LEFT UPPER LIMB, INCLUDING SHOULDER: Status: ACTIVE | Noted: 2018-09-21

## 2018-09-21 PROBLEM — D22.72 MELANOCYTIC NEVI OF LEFT LOWER LIMB, INCLUDING HIP: Status: ACTIVE | Noted: 2018-09-21

## 2018-09-21 PROCEDURE — 99214 OFFICE O/P EST MOD 30 MIN: CPT | Mod: 25

## 2018-09-21 PROCEDURE — ? COUNSELING

## 2018-09-21 PROCEDURE — 17004 DESTROY PREMAL LESIONS 15/>: CPT

## 2018-09-21 PROCEDURE — ? SUNSCREEN RECOMMENDATIONS

## 2018-09-21 PROCEDURE — ? LIQUID NITROGEN

## 2018-09-21 ASSESSMENT — LOCATION DETAILED DESCRIPTION DERM
LOCATION DETAILED: RIGHT VENTRAL PROXIMAL FOREARM
LOCATION DETAILED: LEFT SUPERIOR CRUS OF ANTIHELIX
LOCATION DETAILED: INFERIOR THORACIC SPINE
LOCATION DETAILED: RIGHT SUPERIOR PARIETAL SCALP
LOCATION DETAILED: RIGHT PROXIMAL PRETIBIAL REGION
LOCATION DETAILED: LEFT DISTAL POSTERIOR THIGH
LOCATION DETAILED: LEFT PROXIMAL POSTERIOR UPPER ARM
LOCATION DETAILED: RIGHT SUPERIOR CRUS OF ANTIHELIX
LOCATION DETAILED: LEFT SUPERIOR OCCIPITAL SCALP
LOCATION DETAILED: LEFT NASAL SIDEWALL
LOCATION DETAILED: NASAL DORSUM
LOCATION DETAILED: LEFT VENTRAL PROXIMAL FOREARM
LOCATION DETAILED: LEFT LATERAL PROXIMAL PRETIBIAL REGION
LOCATION DETAILED: RIGHT CENTRAL FRONTAL SCALP
LOCATION DETAILED: RIGHT DISTAL POSTERIOR THIGH
LOCATION DETAILED: RIGHT SUPERIOR UPPER BACK
LOCATION DETAILED: MID-FRONTAL SCALP
LOCATION DETAILED: RIGHT DISTAL POSTERIOR UPPER ARM
LOCATION DETAILED: LEFT CENTRAL FRONTAL SCALP
LOCATION DETAILED: LEFT SUPERIOR PARIETAL SCALP
LOCATION DETAILED: RIGHT CENTRAL PARIETAL SCALP
LOCATION DETAILED: RIGHT INFERIOR MEDIAL MIDBACK
LOCATION DETAILED: RIGHT INFERIOR MEDIAL UPPER BACK
LOCATION DETAILED: RIGHT SUPERIOR HELIX
LOCATION DETAILED: RIGHT INFERIOR CENTRAL MALAR CHEEK
LOCATION DETAILED: RIGHT INFERIOR FOREHEAD
LOCATION DETAILED: RIGHT CENTRAL POSTAURICULAR SKIN

## 2018-09-21 ASSESSMENT — LOCATION SIMPLE DESCRIPTION DERM
LOCATION SIMPLE: UPPER BACK
LOCATION SIMPLE: RIGHT UPPER BACK
LOCATION SIMPLE: ANTERIOR SCALP
LOCATION SIMPLE: RIGHT POSTERIOR UPPER ARM
LOCATION SIMPLE: RIGHT CHEEK
LOCATION SIMPLE: RIGHT POSTERIOR THIGH
LOCATION SIMPLE: SCALP
LOCATION SIMPLE: RIGHT LOWER BACK
LOCATION SIMPLE: RIGHT FOREHEAD
LOCATION SIMPLE: NOSE
LOCATION SIMPLE: LEFT POSTERIOR UPPER ARM
LOCATION SIMPLE: LEFT SCALP
LOCATION SIMPLE: LEFT FOREARM
LOCATION SIMPLE: RIGHT FOREARM
LOCATION SIMPLE: RIGHT SCALP
LOCATION SIMPLE: RIGHT PRETIBIAL REGION
LOCATION SIMPLE: LEFT NOSE
LOCATION SIMPLE: LEFT OCCIPITAL SCALP
LOCATION SIMPLE: LEFT PRETIBIAL REGION
LOCATION SIMPLE: LEFT EAR
LOCATION SIMPLE: LEFT POSTERIOR THIGH
LOCATION SIMPLE: RIGHT EAR

## 2018-09-21 ASSESSMENT — LOCATION ZONE DERM
LOCATION ZONE: NOSE
LOCATION ZONE: ARM
LOCATION ZONE: EAR
LOCATION ZONE: FACE
LOCATION ZONE: LEG
LOCATION ZONE: SCALP
LOCATION ZONE: TRUNK

## 2019-04-08 ENCOUNTER — HOSPITAL ENCOUNTER (OUTPATIENT)
Dept: RADIOLOGY | Facility: MEDICAL CENTER | Age: 79
End: 2019-04-08
Attending: INTERNAL MEDICINE
Payer: MEDICARE

## 2019-04-08 DIAGNOSIS — I65.29 STENOSIS OF CAROTID ARTERY, UNSPECIFIED LATERALITY: ICD-10-CM

## 2019-04-08 DIAGNOSIS — I65.23 BILATERAL CAROTID ARTERY STENOSIS: ICD-10-CM

## 2019-04-08 PROCEDURE — 93880 EXTRACRANIAL BILAT STUDY: CPT

## 2019-07-22 ENCOUNTER — APPOINTMENT (OUTPATIENT)
Dept: ADMISSIONS | Facility: MEDICAL CENTER | Age: 79
End: 2019-07-22
Attending: SPECIALIST
Payer: COMMERCIAL

## 2019-07-22 DIAGNOSIS — Z01.810 PRE-OPERATIVE CARDIOVASCULAR EXAMINATION: ICD-10-CM

## 2019-07-22 DIAGNOSIS — Z01.812 PRE-OPERATIVE LABORATORY EXAMINATION: ICD-10-CM

## 2019-07-22 LAB
EKG IMPRESSION: NORMAL
ERYTHROCYTE [DISTWIDTH] IN BLOOD BY AUTOMATED COUNT: 43.8 FL (ref 35.9–50)
HCT VFR BLD AUTO: 45.5 % (ref 42–52)
HGB BLD-MCNC: 14.4 G/DL (ref 14–18)
MCH RBC QN AUTO: 28.5 PG (ref 27–33)
MCHC RBC AUTO-ENTMCNC: 31.6 G/DL (ref 33.7–35.3)
MCV RBC AUTO: 90.1 FL (ref 81.4–97.8)
PLATELET # BLD AUTO: 191 K/UL (ref 164–446)
PMV BLD AUTO: 11.8 FL (ref 9–12.9)
RBC # BLD AUTO: 5.05 M/UL (ref 4.7–6.1)
WBC # BLD AUTO: 6.3 K/UL (ref 4.8–10.8)

## 2019-07-22 PROCEDURE — 85027 COMPLETE CBC AUTOMATED: CPT

## 2019-07-22 PROCEDURE — 36415 COLL VENOUS BLD VENIPUNCTURE: CPT

## 2019-07-22 NOTE — OR NURSING
"Preadmit appointment: \" Preparing for your Procedure information\" sheet given to patient with verbal and written instructions. Patient instructed to continue prescribed medications through the day before surgery, instructed to take the following medications the day of surgery per anesthesia protocol:   Sinemet.  Pt states, no issues with anesthesia.  All Pt's questions answered.  "

## 2019-07-29 ENCOUNTER — ANESTHESIA EVENT (OUTPATIENT)
Dept: SURGERY | Facility: MEDICAL CENTER | Age: 79
End: 2019-07-29
Payer: COMMERCIAL

## 2019-07-29 ENCOUNTER — HOSPITAL ENCOUNTER (OUTPATIENT)
Facility: MEDICAL CENTER | Age: 79
End: 2019-07-29
Attending: SPECIALIST | Admitting: SPECIALIST
Payer: COMMERCIAL

## 2019-07-29 ENCOUNTER — ANESTHESIA (OUTPATIENT)
Dept: SURGERY | Facility: MEDICAL CENTER | Age: 79
End: 2019-07-29
Payer: COMMERCIAL

## 2019-07-29 VITALS
RESPIRATION RATE: 16 BRPM | DIASTOLIC BLOOD PRESSURE: 90 MMHG | OXYGEN SATURATION: 96 % | HEART RATE: 65 BPM | TEMPERATURE: 97.9 F | WEIGHT: 206.79 LBS | SYSTOLIC BLOOD PRESSURE: 165 MMHG | HEIGHT: 70 IN | BODY MASS INDEX: 29.6 KG/M2

## 2019-07-29 PROCEDURE — 160028 HCHG SURGERY MINUTES - 1ST 30 MINS LEVEL 3: Performed by: SPECIALIST

## 2019-07-29 PROCEDURE — 700105 HCHG RX REV CODE 258: Performed by: SPECIALIST

## 2019-07-29 PROCEDURE — A6410 STERILE EYE PAD: HCPCS | Performed by: SPECIALIST

## 2019-07-29 PROCEDURE — 160035 HCHG PACU - 1ST 60 MINS PHASE I: Performed by: SPECIALIST

## 2019-07-29 PROCEDURE — 160046 HCHG PACU - 1ST 60 MINS PHASE II: Performed by: SPECIALIST

## 2019-07-29 PROCEDURE — 160009 HCHG ANES TIME/MIN: Performed by: SPECIALIST

## 2019-07-29 PROCEDURE — 700111 HCHG RX REV CODE 636 W/ 250 OVERRIDE (IP): Performed by: ANESTHESIOLOGY

## 2019-07-29 PROCEDURE — 700101 HCHG RX REV CODE 250: Performed by: SPECIALIST

## 2019-07-29 PROCEDURE — 501838 HCHG SUTURE GENERAL: Performed by: SPECIALIST

## 2019-07-29 PROCEDURE — 160048 HCHG OR STATISTICAL LEVEL 1-5: Performed by: SPECIALIST

## 2019-07-29 PROCEDURE — 160039 HCHG SURGERY MINUTES - EA ADDL 1 MIN LEVEL 3: Performed by: SPECIALIST

## 2019-07-29 PROCEDURE — 160025 RECOVERY II MINUTES (STATS): Performed by: SPECIALIST

## 2019-07-29 PROCEDURE — 700101 HCHG RX REV CODE 250: Performed by: ANESTHESIOLOGY

## 2019-07-29 PROCEDURE — 160002 HCHG RECOVERY MINUTES (STAT): Performed by: SPECIALIST

## 2019-07-29 RX ORDER — OXYCODONE HCL 5 MG/5 ML
5 SOLUTION, ORAL ORAL EVERY 4 HOURS PRN
Status: DISCONTINUED | OUTPATIENT
Start: 2019-07-29 | End: 2019-07-29 | Stop reason: HOSPADM

## 2019-07-29 RX ORDER — LIDOCAINE HYDROCHLORIDE AND EPINEPHRINE 10; 10 MG/ML; UG/ML
INJECTION, SOLUTION INFILTRATION; PERINEURAL
Status: DISCONTINUED | OUTPATIENT
Start: 2019-07-29 | End: 2019-07-29 | Stop reason: HOSPADM

## 2019-07-29 RX ORDER — SODIUM CHLORIDE, SODIUM LACTATE, POTASSIUM CHLORIDE, AND CALCIUM CHLORIDE .6; .31; .03; .02 G/100ML; G/100ML; G/100ML; G/100ML
500 INJECTION, SOLUTION INTRAVENOUS ONCE
Status: DISCONTINUED | OUTPATIENT
Start: 2019-07-29 | End: 2019-07-29 | Stop reason: HOSPADM

## 2019-07-29 RX ORDER — LABETALOL HYDROCHLORIDE 5 MG/ML
INJECTION, SOLUTION INTRAVENOUS PRN
Status: DISCONTINUED | OUTPATIENT
Start: 2019-07-29 | End: 2019-07-29 | Stop reason: SURG

## 2019-07-29 RX ORDER — HYDROMORPHONE HYDROCHLORIDE 1 MG/ML
0.2 INJECTION, SOLUTION INTRAMUSCULAR; INTRAVENOUS; SUBCUTANEOUS
Status: DISCONTINUED | OUTPATIENT
Start: 2019-07-29 | End: 2019-07-29 | Stop reason: HOSPADM

## 2019-07-29 RX ORDER — ONDANSETRON 2 MG/ML
4 INJECTION INTRAMUSCULAR; INTRAVENOUS
Status: DISCONTINUED | OUTPATIENT
Start: 2019-07-29 | End: 2019-07-29 | Stop reason: HOSPADM

## 2019-07-29 RX ORDER — MEPERIDINE HYDROCHLORIDE 25 MG/ML
6.25 INJECTION INTRAMUSCULAR; INTRAVENOUS; SUBCUTANEOUS
Status: DISCONTINUED | OUTPATIENT
Start: 2019-07-29 | End: 2019-07-29 | Stop reason: HOSPADM

## 2019-07-29 RX ORDER — HALOPERIDOL 5 MG/ML
1 INJECTION INTRAMUSCULAR
Status: DISCONTINUED | OUTPATIENT
Start: 2019-07-29 | End: 2019-07-29 | Stop reason: HOSPADM

## 2019-07-29 RX ORDER — BALANCED SALT SOLUTION 6.4; .75; .48; .3; 3.9; 1.7 MG/ML; MG/ML; MG/ML; MG/ML; MG/ML; MG/ML
SOLUTION OPHTHALMIC
Status: DISCONTINUED | OUTPATIENT
Start: 2019-07-29 | End: 2019-07-29 | Stop reason: HOSPADM

## 2019-07-29 RX ORDER — CEFAZOLIN SODIUM 1 G/3ML
INJECTION, POWDER, FOR SOLUTION INTRAMUSCULAR; INTRAVENOUS PRN
Status: DISCONTINUED | OUTPATIENT
Start: 2019-07-29 | End: 2019-07-29 | Stop reason: SURG

## 2019-07-29 RX ORDER — LABETALOL HYDROCHLORIDE 5 MG/ML
5 INJECTION, SOLUTION INTRAVENOUS
Status: DISCONTINUED | OUTPATIENT
Start: 2019-07-29 | End: 2019-07-29 | Stop reason: HOSPADM

## 2019-07-29 RX ORDER — HYDROMORPHONE HYDROCHLORIDE 1 MG/ML
0.4 INJECTION, SOLUTION INTRAMUSCULAR; INTRAVENOUS; SUBCUTANEOUS
Status: DISCONTINUED | OUTPATIENT
Start: 2019-07-29 | End: 2019-07-29 | Stop reason: HOSPADM

## 2019-07-29 RX ORDER — DIPHENHYDRAMINE HYDROCHLORIDE 50 MG/ML
12.5 INJECTION INTRAMUSCULAR; INTRAVENOUS
Status: DISCONTINUED | OUTPATIENT
Start: 2019-07-29 | End: 2019-07-29 | Stop reason: HOSPADM

## 2019-07-29 RX ORDER — SODIUM CHLORIDE, SODIUM LACTATE, POTASSIUM CHLORIDE, CALCIUM CHLORIDE 600; 310; 30; 20 MG/100ML; MG/100ML; MG/100ML; MG/100ML
1000 INJECTION, SOLUTION INTRAVENOUS
Status: COMPLETED | OUTPATIENT
Start: 2019-07-29 | End: 2019-07-29

## 2019-07-29 RX ORDER — HYDROMORPHONE HYDROCHLORIDE 1 MG/ML
0.1 INJECTION, SOLUTION INTRAMUSCULAR; INTRAVENOUS; SUBCUTANEOUS
Status: DISCONTINUED | OUTPATIENT
Start: 2019-07-29 | End: 2019-07-29 | Stop reason: HOSPADM

## 2019-07-29 RX ORDER — SODIUM CHLORIDE, SODIUM LACTATE, POTASSIUM CHLORIDE, CALCIUM CHLORIDE 600; 310; 30; 20 MG/100ML; MG/100ML; MG/100ML; MG/100ML
INJECTION, SOLUTION INTRAVENOUS CONTINUOUS
Status: DISCONTINUED | OUTPATIENT
Start: 2019-07-29 | End: 2019-07-29 | Stop reason: HOSPADM

## 2019-07-29 RX ORDER — OXYCODONE HCL 5 MG/5 ML
10 SOLUTION, ORAL ORAL
Status: DISCONTINUED | OUTPATIENT
Start: 2019-07-29 | End: 2019-07-29 | Stop reason: HOSPADM

## 2019-07-29 RX ADMIN — SODIUM CHLORIDE, POTASSIUM CHLORIDE, SODIUM LACTATE AND CALCIUM CHLORIDE 1000 ML: 600; 310; 30; 20 INJECTION, SOLUTION INTRAVENOUS at 10:19

## 2019-07-29 RX ADMIN — CEFAZOLIN 2 G: 1 INJECTION, POWDER, FOR SOLUTION INTRAVENOUS at 11:24

## 2019-07-29 RX ADMIN — MIDAZOLAM HYDROCHLORIDE 2 MG: 1 INJECTION, SOLUTION INTRAMUSCULAR; INTRAVENOUS at 11:54

## 2019-07-29 RX ADMIN — LABETALOL HYDROCHLORIDE 5 MG: 5 INJECTION, SOLUTION INTRAVENOUS at 12:10

## 2019-07-29 RX ADMIN — FENTANYL CITRATE 50 MCG: 50 INJECTION, SOLUTION INTRAMUSCULAR; INTRAVENOUS at 11:36

## 2019-07-29 RX ADMIN — PROPOFOL 120 MCG/KG/MIN: 10 INJECTION, EMULSION INTRAVENOUS at 11:36

## 2019-07-29 ASSESSMENT — PAIN SCALES - GENERAL: PAIN_LEVEL: 0

## 2019-07-29 NOTE — ANESTHESIA QCDR
2019 [facility] Clinical Data Registry (for Quality Improvement)     Postoperative nausea/vomiting risk protocol (Adult = 18 yrs and Pediatric 3-17 yrs)- (430 and 463)  General inhalation anesthetic (NOT TIVA) with PONV risk factors: Yes  Provision of anti-emetic therapy with at least 2 different classes of agents: Yes   Patient DID NOT receive anti-emetic therapy and reason is documented in Medical Record:  N/A    Multimodal Pain Management- (AQI59)  Patient undergoing Elective Surgery (i.e. Outpatient, or ASC, or Prescheduled Surgery prior to Hospital Admission): Yes  Use of Multimodal Pain Management, two or more drugs and/or interventions, NOT including systemic opioids: Yes   Exception: Documented allergy to multiple classes of analgesics:  N/A    PACU assessment of acute postoperative pain prior to Anesthesia Care End- Applies to Patients Age = 18- (ABG7)  Initial PACU pain score is which of the following: < 7/10  Patient unable to report pain score: N/A    Post-anesthetic transfer of care checklist/protocol to PACU/ICU- (426 and 427)  Upon conclusion of case, patient transferred to which of the following locations: PACU/Non-ICU  Use of transfer checklist/protocol: Yes  Exclusion: Service Performed in Patient Hospital Room (and thus did not require transfer): N/A    PACU Reintubation- (AQI31)  General anesthesia requiring endotracheal intubation (ETT) along with subsequent extubation in OR or PACU: No  Required reintubation in the PACU: N/A  Extubation was a planned trial documented in the medical record prior to removal of the original airway device: N/A    Unplanned admission to ICU related to anesthesia service up through end of PACU care- (MD51)  Unplanned admission to ICU (not initially anticipated at anesthesia start time): No

## 2019-07-29 NOTE — DISCHARGE INSTRUCTIONS
ACTIVITY: Rest and take it easy for the first 24 hours.  A responsible adult is recommended to remain with you during that time.  It is normal to feel sleepy.  We encourage you to not do anything that requires balance, judgment or coordination.    MILD FLU-LIKE SYMPTOMS ARE NORMAL. YOU MAY EXPERIENCE GENERALIZED MUSCLE ACHES, THROAT IRRITATION, HEADACHE AND/OR SOME NAUSEA.    FOR 24 HOURS DO NOT:  Drive, operate machinery or run household appliances.  Drink beer or alcoholic beverages.   Make important decisions or sign legal documents.    SPECIAL INSTRUCTIONS:     Ice Normal Saline compresses eyelids on/off for 48 hours   Refresh eye drops as needed while awake   Lacrilube as needed while asleep   Keep dressings intact   Head of Bed at least 30 degrees    DIET: To avoid nausea, slowly advance diet as tolerated, avoiding spicy or greasy foods for the first day.  Add more substantial food to your diet according to your physician's instructions. INCREASE FLUIDS AND FIBER TO AVOID CONSTIPATION.      FOLLOW-UP APPOINTMENT:  A follow-up appointment should be arranged with your doctor tomorrow; call to schedule.    You should CALL YOUR PHYSICIAN if you develop:  Fever greater than 101 degrees F.  Pain not relieved by medication, or persistent nausea or vomiting.  Excessive bleeding (blood soaking through dressing) or unexpected drainage from the wound.  Extreme redness or swelling around the incision site, drainage of pus or foul smelling drainage.  Inability to urinate or empty your bladder within 8 hours.  Problems with breathing or chest pain.    You should call 911 if you develop problems with breathing or chest pain.  If you are unable to contact your doctor or surgical center, you should go to the nearest emergency room or urgent care center.  Physician's telephone #: Dr. Baron [phone]    If any questions arise, call your doctor.  If your doctor is not available, please feel free to call the Surgical Center  at [phone].  The Center is open Monday through Friday from 7AM to 7PM.  You can also call the HEALTH HOTLINE open 24 hours/day, 7 days/week and speak to a nurse at [phone], or toll free at [phone].    A registered nurse may call you a few days after your surgery to see how you are doing after your procedure.    MEDICATIONS: Resume taking daily medication.  Take prescribed pain medication with food.  If no medication is prescribed, you may take non-aspirin pain medication if needed.  PAIN MEDICATION CAN BE VERY CONSTIPATING.  Take a stool softener or laxative such as senokot, pericolace, or milk of magnesia if needed.    Prescription given preoperatively for nausea medication and antibiotics.  Last pain medication given at N/A.    If your physician has prescribed pain medication that includes Acetaminophen (Tylenol), do not take additional Acetaminophen (Tylenol) while taking the prescribed medication.    Depression / Suicide Risk    As you are discharged from this [facility] facility, it is important to learn how to keep safe from harming yourself.    Recognize the warning signs:  · Abrupt changes in personality, positive or negative- including increase in energy   · Giving away possessions  · Change in eating patterns- significant weight changes-  positive or negative  · Change in sleeping patterns- unable to sleep or sleeping all the time   · Unwillingness or inability to communicate  · Depression  · Unusual sadness, discouragement and loneliness  · Talk of wanting to die  · Neglect of personal appearance   · Rebelliousness- reckless behavior  · Withdrawal from people/activities they love  · Confusion- inability to concentrate     If you or a loved one observes any of these behaviors or has concerns about self-harm, here's what you can do:  · Talk about it- your feelings and reasons for harming yourself  · Remove any means that you might use to hurt yourself (examples: pills, rope, extension  cords, firearm)  · Get professional help from the community (Mental Health, Substance Abuse, psychological counseling)  · Do not be alone:Call your Safe Contact- someone whom you trust who will be there for you.  · Call your local CRISIS HOTLINE 876-9455 or 481-250-5466  · Call your local Children's Mobile Crisis Response Team [place] (202) 117-9076 or www.My Visual Brief  · Call the toll free National Suicide Prevention Hotlines   · National Suicide Prevention Lifeline 796-441-ENBZ (2150)  · National Hope Line Network 800-SUICIDE (739-7830)

## 2019-07-29 NOTE — ANESTHESIA POSTPROCEDURE EVALUATION
Patient: Hamlet Del Cid    Procedure Summary     Date:  07/29/19 Room / Location:   OR 03 / SURGERY [facility] [place]    Anesthesia Start:  1124 Anesthesia Stop:  1251    Procedures:       BLEPHAROPLASTY- UPPER (Bilateral Face)      RHYTIDECTOMY, FOREHEAD- DIRECT BROW LIFT (Right Face) Diagnosis:  (BILATERAL UPPER EYELID DERMATOCHALASIS WITH VISUAL FIELD, LIMITATIONS, RIGHT BROW PTOSIS)    Surgeon:  Jos Baron M.D. Responsible Provider:  Victor Manuel Ayala M.D.    Anesthesia Type:  MAC ASA Status:  2          Final Anesthesia Type: MAC  Last vitals  BP   Blood Pressure : (!) 167/86    Temp   36.4 °C (97.5 °F)    Pulse   Pulse: 65   Resp   14    SpO2   96 %      Anesthesia Post Evaluation    Patient location during evaluation: PACU  Patient participation: complete - patient participated  Level of consciousness: awake and alert  Pain score: 0    Airway patency: patent  Anesthetic complications: no  Cardiovascular status: hemodynamically stable  Respiratory status: acceptable  Hydration status: euvolemic    PONV: none           Nurse Pain Score: 0 (NPRS)

## 2019-07-29 NOTE — OR SURGEON
Immediate Post OP Note    PreOp Diagnosis: Bilateral upper eyelid dermatochalasis with visual field limitation and right brow ptosis    PostOp Diagnosis: Same    Procedure(s):  BLEPHAROPLASTY- UPPER  RHYTIDECTOMY, FOREHEAD- DIRECT BROW LIFT    Surgeon(s):  Jos Baron M.D.    Anesthesiologist/Type of Anesthesia:  Anesthesiologist: Victor Manuel Ayala M.D./SAMANTHA    Surgical Staff:  Circulator: Itz Cotton R.N.  Scrub Person: Charlie Rowell R.N.    Specimens removed if any:  * No specimens in log *    Estimated Blood Loss: 5 cc    Findings: See dictation    Complications: None        7/29/2019 11:07 AM Jos Baron M.D.

## 2019-07-29 NOTE — ANESTHESIA TIME REPORT
"Anesthesia Start and Stop Event Times     Date Time Event    7/29/2019 1124 Anesthesia Start     1251 Anesthesia Stop        Responsible Staff  07/29/19    Name Role Begin End    Victor Manuel Ayala M.D. Anesth 1124 1251        Preop Diagnosis (Free Text):  Pre-op Diagnosis     BILATERAL UPPER EYELID DERMATOCHALASIS WITH VISUAL FIELD, LIMITATIONS, RIGHT BROW PTOSIS        Preop Diagnosis (Codes):  Diagnosis Information     Diagnosis Code(s):         Post op Diagnosis  Acquired ptosis of eyelid, bilateral  right brow ptosis    Premium Reason  Non-Premium    Comments: 15 minutes of surgery were \"cosmetic\"                                                                      "

## 2019-07-29 NOTE — OP REPORT
DATE OF SERVICE:  07/29/2019    SERVICE:  Plastic surgery.    SURGEON:  Ernesto Baron MD    ANESTHESIOLOGIST:  Victro Manuel Ayala MD    ANESTHESIA:  Monitored anesthesia care with IV sedation.    PREOPERATIVE DIAGNOSES:  Bilateral upper eyelid dermatochalasis with visual   field limitation and right brow ptosis with asymmetry.    POSTOPERATIVE DIAGNOSES:  Bilateral upper eyelid dermatochalasis with visual   field limitation and right brow ptosis with asymmetry.    OPERATIVE PROCEDURE:  Bilateral upper blepharoplasties and right direct brow   lift.    INDICATIONS:  A 78-year-old male who is retired  with well controlled   Parkinson's disease and a survivor of cancer has right worse than left upper   eyelid dermatochalasis and visual field limitation as assessed by his   ophthalmologist.  He also has right greater than left brow ptosis with   asymmetry where the right brow is about 8 mm lower than the left.  The patient   fulfills the criteria of insurance to cover his bilateral upper   blepharoplasty to treat his visual field limitation.  He will also undergo   cosmetic right direct brow lift.  This will be performed under monitored   anesthesia care by Dr. Ayala as an outpatient.  The patient does have a   cardiac history of SVT and atrial fibrillation and is currently in sinus   rhythm.  He understands risks, benefits, and alternatives including, but not   limited to the risks of bleeding, hematoma, seroma, infection, wound   dehiscence, painful or unsightly scarring, hypertrophic or keloid scarring,   painful neuroma, sensory loss or decrease, too much or too little skin, fat   and muscle resection, persistent or recurrent upper eyelid dermatochalasis   with visual field limitation, persistent or recurrent asymmetry, persistent or   recurrent brow ptosis with asymmetry, loss of lashes and brow hair, scar   alopecia, upper eyelid lagophthalmos or ptosis, lower eyelid retraction or   ectropion, exposure  keratitis, corneal abrasion, dry eye syndrome, chemosis,   visual compromise, retrobulbar hematoma, blindness, blurred vision, persistent   or recurrent visual field limitation, incomplete correction of visual field   limitation, malposition or migration of blepharoplasty scars and brow lift   scar, conjunctivitis, blepharitis, overcorrection or under correction, changes   with weight gain or loss, changes with aging, medications, health condition,   trauma, infection, sun exposure, cardiovascular or cardiorespiratory   compromise, aspiration pneumonitis, hemorrhage, need for transfusion,   complications related to sutures, medications, benzoin, Steri-Strips, and IV   sedation, mortality, and need for future revision.  Patient is motivated and   signed informed consent.    OPERATIVE REPORT:  The patient was marked preoperatively, noting that he has   right worse than left bilateral upper eyelid dermatochalasis and right worse   than left brow ptosis with asymmetry.  He was marked for bilateral upper   blepharoplasty and right direct brow lift.  The patient was then taken to the   OR where he was prepped and draped in supine position after undergoing   satisfactory monitored anesthesia care with IV sedation by Dr. Ayala.    Starting with the right upper eyelid, it was marked and then infiltrated with   1% Xylocaine with 1:100,000 dilution epinephrine.  A supratarsal fold incision   was made and carried into a lateral orbital crease.  Above that, an ellipse   of skin was excised followed by a strip of orbicularis oculi muscle.    Hemostasis secured using electrocautery and wound irrigated with sterile   saline.  Small incision was made in the medial and middle orbital septum   gaining access to these compartments where appropriate amount of herniating   fat was excised using needle-tip electrocautery after infiltration with   Xylocaine with epinephrine.  Hemostasis secured and wound irrigated.  The   lateral extent  of right upper eyelid incision was closed using interrupted 6-0   Prolene sutures followed by running subcuticular 5-0 Prolene suture to close   the supratarsal fold incision.  No obvious lagophthalmos or ptosis was noted.    The right eye was irrigated with BSS, well lubricated and ice saline compress   applied.  Similar procedure was performed on the slightly less severe left   upper eyelid with similar marking, infiltration, slightly less skin and muscle   resection, and a similar amount of orbital septal fat resection.  Hemostasis   secured using electrocautery and wound irrigated with sterile saline.  The   lateral extent of the left upper eyelid incision was closed using interrupted   6-0 Prolene sutures followed by running subcuticular 5-0 Prolene suture to   close the supratarsal fold incision.  The left eye was irrigated with BSS,   well lubricated and then ice cold saline compress applied.  The medial and   lateral loose ends of the 5-0 Prolene sutures were secured to the proximal   nasal dorsum and bilateral lateral orbital skin respectively with benzoin and   Steri-Strips.  Finally, the cosmetic right direct brow lift was performed,   which was marked preoperatively and remarked and then infiltrated with 1%   Xylocaine with 1:100,000 dilution epinephrine.  Along the length of the upper   edge of the right brow hair, a transverse incision was made, a slightly   obliquely to respect the angle of the hair follicles.  This was done from   medial to lateral extent of the brow.  A narrow elliptical excision of skin   was then performed about 8-9 mm above with a 15 blade and then the skin   between was excised, identifying and protecting the supraorbital and   supratrochlear nerves and vessels.  Hemostasis secured using electrocautery   and wound irrigated with sterile saline.  At this point, the direct brow lift   was performed by reapproximating the dermis using interrupted buried 5-0   Monocryl sutures and  then the skin was closed using running 6-0 Prolene in the   skin.  Excellent improvement and symmetry was noted at the brow and no   obvious lagophthalmos of the right or left eye.  The face was cleansed and   then ice cold saline compresses were applied and secured with Surginet gauze.    The patient had approximately 5 mL blood loss and no complications.  All   counts were correct at the end of the procedure.  Please note that the   cosmetic portion took 15 minutes.  He was awakened from IV sedation and   returned to recovery room in stable condition.       ____________________________________     MD LEXI MAS / NTS    DD:  07/29/2019 13:27:05  DT:  07/29/2019 14:24:16    D#:  [id number]  Job#:  [id number]    cc: NAT SANDERS MD

## 2019-07-29 NOTE — OR NURSING
1247- Patient arrived from OR. Arouses easily. Respirations spontaneous and unlabored. VSS, see flowsheets. Surgical incisions to bilateral eye brows pink, no drainage. Surgical dressing to bilateral eyes C/D/I. Patient declines any pain or nausea at this time.   1316- Patient continues to decline any pain or nausea. Meets criteria for Stage 2. Report to VEENA Pickering.

## 2019-09-23 ENCOUNTER — APPOINTMENT (RX ONLY)
Dept: URBAN - METROPOLITAN AREA CLINIC 20 | Facility: CLINIC | Age: 79
Setting detail: DERMATOLOGY
End: 2019-09-23

## 2019-09-23 DIAGNOSIS — L81.4 OTHER MELANIN HYPERPIGMENTATION: ICD-10-CM

## 2019-09-23 DIAGNOSIS — Z71.89 OTHER SPECIFIED COUNSELING: ICD-10-CM

## 2019-09-23 DIAGNOSIS — L57.0 ACTINIC KERATOSIS: ICD-10-CM

## 2019-09-23 DIAGNOSIS — L82.1 OTHER SEBORRHEIC KERATOSIS: ICD-10-CM

## 2019-09-23 DIAGNOSIS — D18.0 HEMANGIOMA: ICD-10-CM

## 2019-09-23 DIAGNOSIS — D22 MELANOCYTIC NEVI: ICD-10-CM

## 2019-09-23 DIAGNOSIS — L73.8 OTHER SPECIFIED FOLLICULAR DISORDERS: ICD-10-CM

## 2019-09-23 PROBLEM — D22.71 MELANOCYTIC NEVI OF RIGHT LOWER LIMB, INCLUDING HIP: Status: ACTIVE | Noted: 2019-09-23

## 2019-09-23 PROBLEM — D22.5 MELANOCYTIC NEVI OF TRUNK: Status: ACTIVE | Noted: 2019-09-23

## 2019-09-23 PROBLEM — D22.72 MELANOCYTIC NEVI OF LEFT LOWER LIMB, INCLUDING HIP: Status: ACTIVE | Noted: 2019-09-23

## 2019-09-23 PROBLEM — D18.01 HEMANGIOMA OF SKIN AND SUBCUTANEOUS TISSUE: Status: ACTIVE | Noted: 2019-09-23

## 2019-09-23 PROBLEM — D22.61 MELANOCYTIC NEVI OF RIGHT UPPER LIMB, INCLUDING SHOULDER: Status: ACTIVE | Noted: 2019-09-23

## 2019-09-23 PROBLEM — D22.62 MELANOCYTIC NEVI OF LEFT UPPER LIMB, INCLUDING SHOULDER: Status: ACTIVE | Noted: 2019-09-23

## 2019-09-23 PROBLEM — D22.39 MELANOCYTIC NEVI OF OTHER PARTS OF FACE: Status: ACTIVE | Noted: 2019-09-23

## 2019-09-23 PROCEDURE — 17003 DESTRUCT PREMALG LES 2-14: CPT

## 2019-09-23 PROCEDURE — ? COUNSELING

## 2019-09-23 PROCEDURE — ? LIQUID NITROGEN

## 2019-09-23 PROCEDURE — ? SUNSCREEN RECOMMENDATIONS

## 2019-09-23 PROCEDURE — 99214 OFFICE O/P EST MOD 30 MIN: CPT | Mod: 25

## 2019-09-23 PROCEDURE — 17000 DESTRUCT PREMALG LESION: CPT

## 2019-09-23 ASSESSMENT — LOCATION ZONE DERM
LOCATION ZONE: TRUNK
LOCATION ZONE: SCALP
LOCATION ZONE: ARM
LOCATION ZONE: LEG
LOCATION ZONE: FACE

## 2019-09-23 ASSESSMENT — LOCATION DETAILED DESCRIPTION DERM
LOCATION DETAILED: LEFT DISTAL POSTERIOR THIGH
LOCATION DETAILED: RIGHT DISTAL POSTERIOR UPPER ARM
LOCATION DETAILED: RIGHT INFERIOR FOREHEAD
LOCATION DETAILED: RIGHT PROXIMAL PRETIBIAL REGION
LOCATION DETAILED: LEFT MEDIAL FRONTAL SCALP
LOCATION DETAILED: RIGHT MEDIAL FOREHEAD
LOCATION DETAILED: LEFT PROXIMAL POSTERIOR UPPER ARM
LOCATION DETAILED: RIGHT INFERIOR MEDIAL UPPER BACK
LOCATION DETAILED: LEFT VENTRAL PROXIMAL FOREARM
LOCATION DETAILED: RIGHT DISTAL POSTERIOR THIGH
LOCATION DETAILED: RIGHT VENTRAL PROXIMAL FOREARM
LOCATION DETAILED: LEFT SUPERIOR PARIETAL SCALP
LOCATION DETAILED: RIGHT INFERIOR MEDIAL MIDBACK
LOCATION DETAILED: RIGHT SUPERIOR UPPER BACK
LOCATION DETAILED: LEFT LATERAL PROXIMAL PRETIBIAL REGION
LOCATION DETAILED: RIGHT CENTRAL PARIETAL SCALP
LOCATION DETAILED: RIGHT SUPERIOR OCCIPITAL SCALP
LOCATION DETAILED: RIGHT INFERIOR CENTRAL MALAR CHEEK
LOCATION DETAILED: POSTERIOR MID-PARIETAL SCALP
LOCATION DETAILED: LEFT CENTRAL FRONTAL SCALP
LOCATION DETAILED: INFERIOR THORACIC SPINE

## 2019-09-23 ASSESSMENT — LOCATION SIMPLE DESCRIPTION DERM
LOCATION SIMPLE: SCALP
LOCATION SIMPLE: RIGHT LOWER BACK
LOCATION SIMPLE: RIGHT UPPER BACK
LOCATION SIMPLE: RIGHT POSTERIOR THIGH
LOCATION SIMPLE: LEFT POSTERIOR UPPER ARM
LOCATION SIMPLE: POSTERIOR SCALP
LOCATION SIMPLE: LEFT PRETIBIAL REGION
LOCATION SIMPLE: LEFT FOREARM
LOCATION SIMPLE: RIGHT FOREARM
LOCATION SIMPLE: LEFT POSTERIOR THIGH
LOCATION SIMPLE: RIGHT POSTERIOR UPPER ARM
LOCATION SIMPLE: RIGHT CHEEK
LOCATION SIMPLE: UPPER BACK
LOCATION SIMPLE: RIGHT PRETIBIAL REGION
LOCATION SIMPLE: RIGHT FOREHEAD
LOCATION SIMPLE: LEFT SCALP
LOCATION SIMPLE: RIGHT OCCIPITAL SCALP

## 2019-09-23 NOTE — PROCEDURE: LIQUID NITROGEN
Render Note In Bullet Format When Appropriate: No
Detail Level: Detailed
Consent: The patient's consent was obtained including but not limited to risks of crusting, scabbing, blistering, scarring, darker or lighter pigmentary change, recurrence, incomplete removal and infection. RTC in 2 months if lesion(s) persistent.
Render Post-Care Instructions In Note?: yes
Number Of Freeze-Thaw Cycles: 2 freeze-thaw cycles
Duration Of Freeze Thaw-Cycle (Seconds): 10
Post-Care Instructions: I reviewed with the patient in detail post-care instructions. Patient is to wear sunprotection, and avoid picking at any of the treated lesions. Pt may apply Vaseline to crusted or scabbing areas.

## 2019-10-15 ENCOUNTER — HOSPITAL ENCOUNTER (EMERGENCY)
Facility: MEDICAL CENTER | Age: 79
End: 2019-10-15
Attending: EMERGENCY MEDICINE
Payer: MEDICARE

## 2019-10-15 ENCOUNTER — APPOINTMENT (OUTPATIENT)
Dept: RADIOLOGY | Facility: MEDICAL CENTER | Age: 79
End: 2019-10-15
Attending: EMERGENCY MEDICINE
Payer: MEDICARE

## 2019-10-15 VITALS
SYSTOLIC BLOOD PRESSURE: 127 MMHG | TEMPERATURE: 96.8 F | RESPIRATION RATE: 11 BRPM | OXYGEN SATURATION: 95 % | HEART RATE: 61 BPM | WEIGHT: 211.64 LBS | BODY MASS INDEX: 30.37 KG/M2 | DIASTOLIC BLOOD PRESSURE: 83 MMHG

## 2019-10-15 DIAGNOSIS — I48.0 PAROXYSMAL ATRIAL FIBRILLATION (HCC): ICD-10-CM

## 2019-10-15 LAB
ALBUMIN SERPL BCP-MCNC: 4.4 G/DL (ref 3.2–4.9)
ALBUMIN/GLOB SERPL: 1.6 G/DL
ALP SERPL-CCNC: 81 U/L (ref 30–99)
ALT SERPL-CCNC: <5 U/L (ref 2–50)
ANION GAP SERPL CALC-SCNC: 15 MMOL/L (ref 0–11.9)
AST SERPL-CCNC: 16 U/L (ref 12–45)
BASOPHILS # BLD AUTO: 0.8 % (ref 0–1.8)
BASOPHILS # BLD: 0.05 K/UL (ref 0–0.12)
BILIRUB SERPL-MCNC: 0.4 MG/DL (ref 0.1–1.5)
BUN SERPL-MCNC: 12 MG/DL (ref 8–22)
CALCIUM SERPL-MCNC: 9.3 MG/DL (ref 8.4–10.2)
CHLORIDE SERPL-SCNC: 101 MMOL/L (ref 96–112)
CO2 SERPL-SCNC: 22 MMOL/L (ref 20–33)
CREAT SERPL-MCNC: 0.91 MG/DL (ref 0.5–1.4)
EKG IMPRESSION: NORMAL
EKG IMPRESSION: NORMAL
EOSINOPHIL # BLD AUTO: 0.3 K/UL (ref 0–0.51)
EOSINOPHIL NFR BLD: 4.6 % (ref 0–6.9)
ERYTHROCYTE [DISTWIDTH] IN BLOOD BY AUTOMATED COUNT: 42.9 FL (ref 35.9–50)
GLOBULIN SER CALC-MCNC: 2.7 G/DL (ref 1.9–3.5)
GLUCOSE SERPL-MCNC: 147 MG/DL (ref 65–99)
HCT VFR BLD AUTO: 48.3 % (ref 42–52)
HGB BLD-MCNC: 16 G/DL (ref 14–18)
IMM GRANULOCYTES # BLD AUTO: 0.01 K/UL (ref 0–0.11)
IMM GRANULOCYTES NFR BLD AUTO: 0.2 % (ref 0–0.9)
LYMPHOCYTES # BLD AUTO: 2.15 K/UL (ref 1–4.8)
LYMPHOCYTES NFR BLD: 33.1 % (ref 22–41)
MCH RBC QN AUTO: 29.2 PG (ref 27–33)
MCHC RBC AUTO-ENTMCNC: 33.1 G/DL (ref 33.7–35.3)
MCV RBC AUTO: 88.1 FL (ref 81.4–97.8)
MONOCYTES # BLD AUTO: 0.45 K/UL (ref 0–0.85)
MONOCYTES NFR BLD AUTO: 6.9 % (ref 0–13.4)
NEUTROPHILS # BLD AUTO: 3.53 K/UL (ref 1.82–7.42)
NEUTROPHILS NFR BLD: 54.4 % (ref 44–72)
NRBC # BLD AUTO: 0 K/UL
NRBC BLD-RTO: 0 /100 WBC
PLATELET # BLD AUTO: 202 K/UL (ref 164–446)
PMV BLD AUTO: 10.5 FL (ref 9–12.9)
POTASSIUM SERPL-SCNC: 4.4 MMOL/L (ref 3.6–5.5)
PROT SERPL-MCNC: 7.1 G/DL (ref 6–8.2)
RBC # BLD AUTO: 5.48 M/UL (ref 4.7–6.1)
SODIUM SERPL-SCNC: 138 MMOL/L (ref 135–145)
WBC # BLD AUTO: 6.5 K/UL (ref 4.8–10.8)

## 2019-10-15 PROCEDURE — 92960 CARDIOVERSION ELECTRIC EXT: CPT

## 2019-10-15 PROCEDURE — 700102 HCHG RX REV CODE 250 W/ 637 OVERRIDE(OP): Performed by: EMERGENCY MEDICINE

## 2019-10-15 PROCEDURE — 93005 ELECTROCARDIOGRAM TRACING: CPT | Mod: XU | Performed by: EMERGENCY MEDICINE

## 2019-10-15 PROCEDURE — 96374 THER/PROPH/DIAG INJ IV PUSH: CPT

## 2019-10-15 PROCEDURE — 700105 HCHG RX REV CODE 258: Performed by: EMERGENCY MEDICINE

## 2019-10-15 PROCEDURE — 71045 X-RAY EXAM CHEST 1 VIEW: CPT

## 2019-10-15 PROCEDURE — A9270 NON-COVERED ITEM OR SERVICE: HCPCS | Performed by: EMERGENCY MEDICINE

## 2019-10-15 PROCEDURE — 80053 COMPREHEN METABOLIC PANEL: CPT

## 2019-10-15 PROCEDURE — 700111 HCHG RX REV CODE 636 W/ 250 OVERRIDE (IP): Performed by: EMERGENCY MEDICINE

## 2019-10-15 PROCEDURE — 99285 EMERGENCY DEPT VISIT HI MDM: CPT

## 2019-10-15 PROCEDURE — 85025 COMPLETE CBC W/AUTO DIFF WBC: CPT

## 2019-10-15 RX ORDER — ASPIRIN 325 MG
325 TABLET ORAL ONCE
Status: COMPLETED | OUTPATIENT
Start: 2019-10-15 | End: 2019-10-15

## 2019-10-15 RX ORDER — SODIUM CHLORIDE 9 MG/ML
1000 INJECTION, SOLUTION INTRAVENOUS ONCE
Status: COMPLETED | OUTPATIENT
Start: 2019-10-15 | End: 2019-10-15

## 2019-10-15 RX ORDER — ETOMIDATE 2 MG/ML
10 INJECTION INTRAVENOUS ONCE
Status: COMPLETED | OUTPATIENT
Start: 2019-10-15 | End: 2019-10-15

## 2019-10-15 RX ADMIN — ETOMIDATE 10 MG: 2 INJECTION INTRAVENOUS at 04:36

## 2019-10-15 RX ADMIN — SODIUM CHLORIDE 1000 ML: 9 INJECTION, SOLUTION INTRAVENOUS at 04:27

## 2019-10-15 RX ADMIN — ASPIRIN 325 MG ORAL TABLET 325 MG: 325 PILL ORAL at 05:16

## 2019-10-15 ASSESSMENT — ENCOUNTER SYMPTOMS
SEIZURES: 0
BRUISES/BLEEDS EASILY: 0
FEVER: 0
BLOOD IN STOOL: 0
HEADACHES: 0
ABDOMINAL PAIN: 0
MYALGIAS: 0
SHORTNESS OF BREATH: 0
GASTROINTESTINAL NEGATIVE: 1
CONSTITUTIONAL NEGATIVE: 1
EYE PAIN: 0
RESPIRATORY NEGATIVE: 1
EYES NEGATIVE: 1
SORE THROAT: 0
HALLUCINATIONS: 0
NECK PAIN: 0
WEAKNESS: 0
FOCAL WEAKNESS: 0
BACK PAIN: 0
FLANK PAIN: 0
PALPITATIONS: 1

## 2019-10-15 NOTE — ED NOTES
Pt. Dc home he and wife state understanding of dc but he reports he will only follow up with cardiology if he feels he is getting worse, MD and I both explained that due to his condition and age he should follow up now regardless, pt. Appears to understand the importance but still state he will know when he needs to go and he dose not think it is time now. Pt. NAD steady gate

## 2019-10-15 NOTE — DISCHARGE INSTRUCTIONS
Please call to schedule close follow-up appointment with your primary care physician within the next 1 to 3 days.

## 2019-10-15 NOTE — ED PROVIDER NOTES
ED Provider Note    CHIEF COMPLAINT  Chief Complaint   Patient presents with   • Palpitations       HPI  HPI    Hamlet Del Cid is a 76 y.o. male who presents with palpitations. Has a prior history of paroxysmal atrial fibrillation. He is not on chronic anticoagulation as he typically is cardioverted. He reports his last episode was 2 years ago. Denies chest pain or shortness of breath. No prior history of PE. No prior history of thyroid dysfunction.     The patient does not have a regular cardiologist with whom he follows. Denies recent illness or fevers. He reports he feels in his usual state of health other than palpitations. Had a clear onset of symptoms at approximately 1.5 hours prior to arrival. He states that he was sleeping when all of a sudden he felt an abnormal rhythm in his heart. He recognizes as atrial fibrillation similar to prior episodes and reported to the emergency department promptly afterwards.    REVIEW OF SYSTEMS  Review of Systems   Constitutional: Negative.  Negative for fever.   HENT: Negative.  Negative for ear pain and sore throat.    Eyes: Negative.  Negative for pain.   Respiratory: Negative.  Negative for shortness of breath.    Cardiovascular: Positive for palpitations. Negative for chest pain.   Gastrointestinal: Negative.  Negative for abdominal pain and blood in stool.   Genitourinary: Negative for dysuria and flank pain.   Musculoskeletal: Negative for back pain, myalgias and neck pain.   Skin: Negative.  Negative for rash.   Neurological: Negative for focal weakness, seizures, weakness and headaches.   Endo/Heme/Allergies: Does not bruise/bleed easily.   Psychiatric/Behavioral: Negative for hallucinations and suicidal ideas.   All other systems reviewed and are negative.      PAST MEDICAL HISTORY   has a past medical history of Arthritis, Bronchitis (), Cancer (HCC) (2003), PAF (paroxysmal atrial fibrillation) ([facility]), Parkinson disease ([facility]), Pneumonia (), and SVT  "(supraventricular tachycardia) ([facility]).    SOCIAL HISTORY  Social History     Tobacco Use   • Smoking status: Former Smoker     Years: 10.00     Types: Cigarettes     Last attempt to quit: 1987     Years since quittin.8   • Smokeless tobacco: Never Used   Substance and Sexual Activity   • Alcohol use: Yes     Alcohol/week: 0.0 oz     Comment: 5 per week   • Drug use: No   • Sexual activity: Not on file       SURGICAL HISTORY   has a past surgical history that includes inguinal hernia repair (Left, ); other cardiac surgery; hip arthroplasty total (Right, ); femur orif (Left, ); neck dissection (); inguinal hernia repair (Bilateral, 3/31/2017); umbilical hernia repair (N/A, 3/31/2017); shoulder arthroscopy w/ rotator cuff repair (Right, ); blepharoplasty (Bilateral, 2019); and brow lift (Right, 2019).    CURRENT MEDICATIONS  Home Medications    **Home medications have not yet been reviewed for this encounter**         ALLERGIES  Allergies   Allergen Reactions   • Cat Hair Extract Shortness of Breath     \"wheezing\"       PHYSICAL EXAM  VITAL SIGNS: /83   Pulse 61   Temp 36 °C (96.8 °F) (Oral)   Resp (!) 11   Wt 96 kg (211 lb 10.3 oz)   SpO2 95%   BMI 30.37 kg/m²  @JULIANA[[id number]::@  Pulse ox interpretation: I interpret this pulse ox as normal.    Physical Exam   Constitutional: He is oriented to person, place, and time and well-developed, well-nourished, and in no distress.   HENT:   Head: Normocephalic.   Right Ear: External ear normal.   Left Ear: External ear normal.   Mouth/Throat: No oropharyngeal exudate.   Eyes: Pupils are equal, round, and reactive to light. EOM are normal. No scleral icterus.   Neck: Normal range of motion.   Cardiovascular:   Regular rate.  Tachycardia.   Pulmonary/Chest: Effort normal. No respiratory distress.   Abdominal: He exhibits no distension. There is no tenderness.   Musculoskeletal: Normal range of motion. He exhibits no deformity. "   Neurological: He is alert and oriented to person, place, and time. Coordination normal.   Mild bilateral upper extremities tremors present.   Skin: Skin is warm and dry. No rash noted. No erythema.   Psychiatric: Affect and judgment normal.   Nursing note and vitals reviewed.      DIAGNOSTIC STUDIES / PROCEDURES    Cardioversion Procedure Note     Indication: atrial fibrillation     Consent: The patient was counseled regarding the procedure, its indications, risks, potential complications and alternatives, and any questions were answered. Consent was obtained to proceed.     Pre-Medication: etomidate intravenously     Procedure: The patient was placed in the supine position and the chest area was exposed. The cardioversion pads were applied in the standard manner and configuration.     Attempt #1: The defibrillator was set on the synchronous mode and charged to 200 joules.  A charge was then delivered which resulted in conversion to normal sinus rhythm.     Attempt #2: Not necessary     Attempt #3: Not necessary     The patient tolerated the procedure well.     Complications: None           Procedural Sedation Procedure Note     Indication: cardioversion     Consent: I have discussed with the patient and/or the patient representative the indication, alternatives, and the possible risks and/or complications of the planned procedure and the anesthesia methods. The patient and/or patient representative appear to understand and agree to proceed.     Physician Involvement: The attending physician was present and supervising this procedure.     Pre-Sedation Documentation and Exam: I have personally completed a history, physical exam & review of systems for this patient (see notes).  Vital signs have been reviewed (see flow sheet for vitals).  I have reviewed the patient's history and review of systems.  Airway Assessment: normal, normal neck range of motion, Mallampati Class II - (soft palate, fauces & uvula are  visible)     Prior History of Anesthesia Complications: none     ASA Classification: Class 2 - Mild systemic disease     Sedation/ Anesthesia Plan: intravenous sedation     Medications Used: etomidate intravenously     Monitoring and Safety: The patient was placed on a cardiac monitor and vital signs, pulse oximetry and level of consciousness were continuously evaluated throughout the procedure. The patient was closely monitored until recovery from the medications was complete and the patient had returned to baseline status. Respiratory therapy was on standby at all times during the procedure.       Post-Sedation Vital Signs: Vital signs were reviewed and were stable after the procedure (see flow sheet for vitals)            Post-Sedation Exam: Lungs: clear to auscultation bilaterally and no crackles or wheezing and Cardiovascular: regular rate and rhythm           Complications: none            LABS/EKG  Results for orders placed or performed during the hospital encounter of 10/15/19   CBC WITH DIFFERENTIAL   Result Value Ref Range    WBC 6.5 4.8 - 10.8 K/uL    RBC 5.48 4.70 - 6.10 M/uL    Hemoglobin 16.0 14.0 - 18.0 g/dL    Hematocrit 48.3 42.0 - 52.0 %    MCV 88.1 81.4 - 97.8 fL    MCH 29.2 27.0 - 33.0 pg    MCHC 33.1 (L) 33.7 - 35.3 g/dL    RDW 42.9 35.9 - 50.0 fL    Platelet Count 202 164 - 446 K/uL    MPV 10.5 9.0 - 12.9 fL    Neutrophils-Polys 54.40 44.00 - 72.00 %    Lymphocytes 33.10 22.00 - 41.00 %    Monocytes 6.90 0.00 - 13.40 %    Eosinophils 4.60 0.00 - 6.90 %    Basophils 0.80 0.00 - 1.80 %    Immature Granulocytes 0.20 0.00 - 0.90 %    Nucleated RBC 0.00 /100 WBC    Neutrophils (Absolute) 3.53 1.82 - 7.42 K/uL    Lymphs (Absolute) 2.15 1.00 - 4.80 K/uL    Monos (Absolute) 0.45 0.00 - 0.85 K/uL    Eos (Absolute) 0.30 0.00 - 0.51 K/uL    Baso (Absolute) 0.05 0.00 - 0.12 K/uL    Immature Granulocytes (abs) 0.01 0.00 - 0.11 K/uL    NRBC (Absolute) 0.00 K/uL   CMP   Result Value Ref Range    Sodium 138  135 - 145 mmol/L    Potassium 4.4 3.6 - 5.5 mmol/L    Chloride 101 96 - 112 mmol/L    Co2 22 20 - 33 mmol/L    Anion Gap 15.0 (H) 0.0 - 11.9    Glucose 147 (H) 65 - 99 mg/dL    Bun 12 8 - 22 mg/dL    Creatinine 0.91 0.50 - 1.40 mg/dL    Calcium 9.3 8.4 - 10.2 mg/dL    AST(SGOT) 16 12 - 45 U/L    ALT(SGPT) <5 2 - 50 U/L    Alkaline Phosphatase 81 30 - 99 U/L    Total Bilirubin 0.4 0.1 - 1.5 mg/dL    Albumin 4.4 3.2 - 4.9 g/dL    Total Protein 7.1 6.0 - 8.2 g/dL    Globulin 2.7 1.9 - 3.5 g/dL    A-G Ratio 1.6 g/dL   ESTIMATED GFR   Result Value Ref Range    GFR If African American >60 >60 mL/min/1.73 m 2    GFR If Non African American >60 >60 mL/min/1.73 m 2   EKG   Result Value Ref Range    Report       [facility] Emergency Dept.    Test Date:  2019-10-15  Pt Name:    MARK JENKINS                   Department: EDS  MRN:        [id number]                      Room:       [facility] 3  Gender:     Male                         Technician: TERESA  :        1940                   Requested By:LEAH GRIFFITHS  Order #:    [id number]                    Reading MD:    Measurements  Intervals                                Axis  Rate:       146                          P:  GA:                                      QRS:        -18  QRSD:       71                           T:          60  QT:         282  QTc:        440    Interpretive Statements  Atrial fibrillation with rapid V-rate  Borderline left axis deviation  Minimal ST depression, inferior leads  Compared to ECG 2019 10:46:35  ST (T wave) deviation now present     EKG   Result Value Ref Range    Report       [facility] Emergency Dept.    Test Date:  2019-10-15  Pt Name:    MARK JENKINS                   Department: EDS  MRN:        [id number]                      Room:       [facility] 3  Gender:     Male                         Technician: TERESA  :        1940                   Requested By:LEAH GRIFFITHS  Order #:     [id number]                    Reading MD:    Measurements  Intervals                                Axis  Rate:       72                           P:          31  UT:         156                          QRS:        -34  QRSD:       93                           T:          44  QT:         357  QTc:        391    Interpretive Statements  Sinus rhythm  Atrial premature complex  Left axis deviation  Abnormal R-wave progression, early transition  Compared to ECG 10/15/2019 04:15:31  Atrial premature complex(es) now present  Atrial fibrillation no longer present  ST (T wave) deviation no longer present       12 Lead EKG interpreted by me to show: 4:15 AM  Atrial fibrillation  Rate 146  Axis: Borderline left axis   My impression of this EKG: Atrial fibrillation with RVR.  Minimal ST depression in inferior leads.    12 Lead EKG interpreted by me to show:  Normal sinus rhythm  Rate 72  LAD  My impression of this EKG: No STEMI.     RADIOLOGY  DX-CHEST-PORTABLE (1 VIEW)   Final Result         1.  Bilateral basilar atelectasis, no focal infiltrate           COURSE & MEDICAL DECISION MAKING  Pertinent Labs & Imaging studies reviewed. (See chart for details)    76 y.o. Male presenting with sudden onset palpitations with irregularly irregular rhythm. Consistent with prior episodes of atrial fibrillation. He reports paroxysmal atrial fibrillation history with cardioversion every 4-5 years approximately 5 times altogether.     He denies any chest pain or shortness of breath at this time. He reports that the onset of palpitations started when he stood up earlier today. No history of thyroid issues. No risk factors or symptoms to suggest pulmonary embolism.     Patient does not have a cardiologist at this time.       Risks and benefits of electrical cardioversion were discussed with the patient. He states that he has never been converted without electrical cardioversion.     I discussed the risks of stroke to this patient. He reports  understanding the risks. Given that the patient has a very clear onset of symptoms just 1.5 hours prior to arrival, he appears to be within 48 hours of onset.     I do believe it is reasonable to attempt electrocardioversion at this time. The patient has a CHADS VASC score of 1 given his age. Will treat with antiplatelet therapy. Given a dose of aspirin here and instructed to take aspirin at home until evaluated by cardiology.    The total critical care time on this patient is 31 minutes, resuscitating patient, and deciphering test results.  This 31 minutes is exclusive of separately billable procedures.      FINAL IMPRESSION  Visit Diagnoses     ICD-10-CM   1. Paroxysmal atrial fibrillation (HCC) I48.0              Electronically signed by: Garfield Toure, 10/15/2019 4:48 AM

## 2019-10-15 NOTE — ED TRIAGE NOTES
"Pt. States he is here for his \"yearly afib\" pt. State that he feels chest discomfort and has a hx of afib and that he believes it is acting up tonight he heri SOB   "

## 2019-10-15 NOTE — RESPIRATORY CARE
Conscious Sedation Respiratory Update           Sedation for cardioversion. Patient RR16-20 with an ETCO2 of 26-36.

## 2019-12-16 ENCOUNTER — HOSPITAL ENCOUNTER (EMERGENCY)
Facility: MEDICAL CENTER | Age: 79
End: 2019-12-17
Attending: EMERGENCY MEDICINE
Payer: MEDICARE

## 2019-12-16 ENCOUNTER — APPOINTMENT (OUTPATIENT)
Dept: RADIOLOGY | Facility: MEDICAL CENTER | Age: 79
End: 2019-12-16
Attending: EMERGENCY MEDICINE
Payer: MEDICARE

## 2019-12-16 DIAGNOSIS — I48.0 PAROXYSMAL ATRIAL FIBRILLATION (HCC): ICD-10-CM

## 2019-12-16 DIAGNOSIS — I48.91 ATRIAL FIBRILLATION WITH RAPID VENTRICULAR RESPONSE (HCC): ICD-10-CM

## 2019-12-16 LAB
ALBUMIN SERPL BCP-MCNC: 4.7 G/DL (ref 3.2–4.9)
ALBUMIN/GLOB SERPL: 1.7 G/DL
ALP SERPL-CCNC: 97 U/L (ref 30–99)
ALT SERPL-CCNC: 8 U/L (ref 2–50)
ANION GAP SERPL CALC-SCNC: 14 MMOL/L (ref 0–11.9)
AST SERPL-CCNC: 21 U/L (ref 12–45)
BASOPHILS # BLD AUTO: 0.7 % (ref 0–1.8)
BASOPHILS # BLD: 0.05 K/UL (ref 0–0.12)
BILIRUB SERPL-MCNC: 0.5 MG/DL (ref 0.1–1.5)
BUN SERPL-MCNC: 12 MG/DL (ref 8–22)
CALCIUM SERPL-MCNC: 9.6 MG/DL (ref 8.4–10.2)
CHLORIDE SERPL-SCNC: 99 MMOL/L (ref 96–112)
CO2 SERPL-SCNC: 26 MMOL/L (ref 20–33)
CREAT SERPL-MCNC: 0.86 MG/DL (ref 0.5–1.4)
EOSINOPHIL # BLD AUTO: 0.31 K/UL (ref 0–0.51)
EOSINOPHIL NFR BLD: 4.3 % (ref 0–6.9)
ERYTHROCYTE [DISTWIDTH] IN BLOOD BY AUTOMATED COUNT: 42.5 FL (ref 35.9–50)
GLOBULIN SER CALC-MCNC: 2.8 G/DL (ref 1.9–3.5)
GLUCOSE SERPL-MCNC: 196 MG/DL (ref 65–99)
HCT VFR BLD AUTO: 49.9 % (ref 42–52)
HGB BLD-MCNC: 16.2 G/DL (ref 14–18)
IMM GRANULOCYTES # BLD AUTO: 0.02 K/UL (ref 0–0.11)
IMM GRANULOCYTES NFR BLD AUTO: 0.3 % (ref 0–0.9)
LYMPHOCYTES # BLD AUTO: 2.44 K/UL (ref 1–4.8)
LYMPHOCYTES NFR BLD: 34 % (ref 22–41)
MAGNESIUM SERPL-MCNC: 2.2 MG/DL (ref 1.5–2.5)
MCH RBC QN AUTO: 28.9 PG (ref 27–33)
MCHC RBC AUTO-ENTMCNC: 32.5 G/DL (ref 33.7–35.3)
MCV RBC AUTO: 88.9 FL (ref 81.4–97.8)
MONOCYTES # BLD AUTO: 0.53 K/UL (ref 0–0.85)
MONOCYTES NFR BLD AUTO: 7.4 % (ref 0–13.4)
NEUTROPHILS # BLD AUTO: 3.83 K/UL (ref 1.82–7.42)
NEUTROPHILS NFR BLD: 53.3 % (ref 44–72)
NRBC # BLD AUTO: 0 K/UL
NRBC BLD-RTO: 0 /100 WBC
PLATELET # BLD AUTO: 195 K/UL (ref 164–446)
PMV BLD AUTO: 10.2 FL (ref 9–12.9)
POTASSIUM SERPL-SCNC: 4.2 MMOL/L (ref 3.6–5.5)
PROT SERPL-MCNC: 7.5 G/DL (ref 6–8.2)
RBC # BLD AUTO: 5.61 M/UL (ref 4.7–6.1)
SODIUM SERPL-SCNC: 139 MMOL/L (ref 135–145)
TROPONIN T SERPL-MCNC: 14 NG/L (ref 6–19)
TSH SERPL DL<=0.005 MIU/L-ACNC: 3.75 UIU/ML (ref 0.38–5.33)
WBC # BLD AUTO: 7.2 K/UL (ref 4.8–10.8)

## 2019-12-16 PROCEDURE — 99285 EMERGENCY DEPT VISIT HI MDM: CPT

## 2019-12-16 PROCEDURE — 71046 X-RAY EXAM CHEST 2 VIEWS: CPT

## 2019-12-16 PROCEDURE — 92960 CARDIOVERSION ELECTRIC EXT: CPT

## 2019-12-16 PROCEDURE — 84443 ASSAY THYROID STIM HORMONE: CPT

## 2019-12-16 PROCEDURE — 700102 HCHG RX REV CODE 250 W/ 637 OVERRIDE(OP): Performed by: EMERGENCY MEDICINE

## 2019-12-16 PROCEDURE — 96374 THER/PROPH/DIAG INJ IV PUSH: CPT

## 2019-12-16 PROCEDURE — 80053 COMPREHEN METABOLIC PANEL: CPT

## 2019-12-16 PROCEDURE — 84484 ASSAY OF TROPONIN QUANT: CPT

## 2019-12-16 PROCEDURE — 83735 ASSAY OF MAGNESIUM: CPT

## 2019-12-16 PROCEDURE — A9270 NON-COVERED ITEM OR SERVICE: HCPCS | Performed by: EMERGENCY MEDICINE

## 2019-12-16 PROCEDURE — 36415 COLL VENOUS BLD VENIPUNCTURE: CPT

## 2019-12-16 PROCEDURE — 85025 COMPLETE CBC W/AUTO DIFF WBC: CPT

## 2019-12-16 PROCEDURE — 93005 ELECTROCARDIOGRAM TRACING: CPT | Performed by: EMERGENCY MEDICINE

## 2019-12-16 RX ORDER — ASPIRIN 81 MG/1
324 TABLET, CHEWABLE ORAL ONCE
Status: COMPLETED | OUTPATIENT
Start: 2019-12-16 | End: 2019-12-16

## 2019-12-16 RX ADMIN — ASPIRIN 81 MG 324 MG: 81 TABLET ORAL at 23:38

## 2019-12-16 ASSESSMENT — CHA2DS2 SCORE
HYPERTENSION: NO
SEX: MALE
CHF OR LEFT VENTRICULAR DYSFUNCTION: NO
CHA2D2S VASC SCORE: 2
AGE IN YEARS: >74
PRIOR STROKE OR TIA OR THROMBOEMBOLISM: NO
DIABETES: NO
VASCULAR DISEASE: NO

## 2019-12-17 ENCOUNTER — TELEPHONE (OUTPATIENT)
Dept: CARDIOLOGY | Facility: MEDICAL CENTER | Age: 79
End: 2019-12-17

## 2019-12-17 VITALS
BODY MASS INDEX: 30.55 KG/M2 | WEIGHT: 213.41 LBS | SYSTOLIC BLOOD PRESSURE: 128 MMHG | DIASTOLIC BLOOD PRESSURE: 81 MMHG | TEMPERATURE: 97 F | OXYGEN SATURATION: 95 % | RESPIRATION RATE: 17 BRPM | HEIGHT: 70 IN | HEART RATE: 73 BPM

## 2019-12-17 LAB
EKG IMPRESSION: NORMAL
EKG IMPRESSION: NORMAL

## 2019-12-17 PROCEDURE — 93005 ELECTROCARDIOGRAM TRACING: CPT | Performed by: EMERGENCY MEDICINE

## 2019-12-17 PROCEDURE — 96374 THER/PROPH/DIAG INJ IV PUSH: CPT | Mod: XU

## 2019-12-17 PROCEDURE — 700111 HCHG RX REV CODE 636 W/ 250 OVERRIDE (IP)

## 2019-12-17 RX ADMIN — PROPOFOL 100 MG: 10 INJECTION, EMULSION INTRAVENOUS at 00:33

## 2019-12-17 NOTE — ED TRIAGE NOTES
"Chief Complaint   Patient presents with   • Palpitations     pt states he has a history of a fib and feels like he is in a fib again.      Pt denies CP or SOB, pt states he was \" converted about 2 month ago. Pt states he is not on blood thinners.   "

## 2019-12-17 NOTE — RESPIRATORY CARE
Conscious Sedation Respiratory Update       O2 (LPM): 2 (12/17/19 0046)  O2 Daily Delivery Respiratory : Nasal Cannula (12/16/19 4295)     ETCO2 24-34 through out procedure. RR 12-20. Patient awake and aware.

## 2019-12-17 NOTE — ED PROVIDER NOTES
ED Provider Note    CHIEF COMPLAINT  Chief Complaint   Patient presents with   • Palpitations     pt states he has a history of a fib and feels like he is in a fib again.         HPI    Primary care provider: Sean GARCIA M.D.   History obtained from: Patient  History limited by: None     Hamlet Del Cid is a 79 y.o. male who presents to the ED with wife complaining of onset of atrial fibrillation about 30 minutes prior to arrival.  Patient has had similar symptoms in the past due to atrial fibrillation and knows exactly when the symptoms began.  He states that it likely was triggered because he felt the urge to belch after eating dinner and that has also triggered it in the past.  He states that in the past, these episodes would occur every 2 years or so and so he is not on any anticoagulation.  He denies any other associated symptoms including dizziness/lightheadedness/pain/shortness of breath or difficulty breathing/diaphoresis/nausea.  No prior history of thyroid disorder.  No recent illness and patient states that he is physically active.  Patient states that he has never converted using medications in the past and would like electrical cardioversion.    REVIEW OF SYSTEMS  Please see HPI for pertinent positives/negatives.  All other systems reviewed and are negative.     PAST MEDICAL HISTORY  Past Medical History:   Diagnosis Date   • Bronchitis    • Pneumonia    • Cancer (HCC) 2003    throat Squamous Cell, with chemo/radiation, lymph nodes removal   • Arthritis     bilateral knees   • PAF (paroxysmal atrial fibrillation) (Formerly [facility])     denies taking any meds, cardioversion last one was 2017, HX of SVT   • Parkinson disease (Formerly [facility])     declines to take meds   • SVT (supraventricular tachycardia) (Formerly [facility])     denies taking any meds,cardioversion        SURGICAL HISTORY  Past Surgical History:   Procedure Laterality Date   • BLEPHAROPLASTY Bilateral 7/29/2019    Procedure: BLEPHAROPLASTY- UPPER;   "Surgeon: Jos Baron M.D.;  Location: SURGERY [facility];  Service: Plastics   • BROW LIFT Right 2019    Procedure: RHYTIDECTOMY, FOREHEAD- DIRECT BROW LIFT;  Surgeon: Jos Baron M.D.;  Location: SURGERY [facility];  Service: Plastics   • INGUINAL HERNIA REPAIR Bilateral 3/31/2017    Procedure: INGUINAL HERNIA REPAIR W/MESH;  Surgeon: Michael Malin M.D.;  Location: SURGERY SAME DAY [facility];  Service:    • UMBILICAL HERNIA REPAIR N/A 3/31/2017    Procedure: UMBILICAL HERNIA REPAIR W/MESH;  Surgeon: Michael Malin M.D.;  Location: SURGERY SAME DAY [facility];  Service:    • NECK DISSECTION     • HIP ARTHROPLASTY TOTAL Right     Hip Replacement, Total   • SHOULDER ARTHROSCOPY W/ ROTATOR CUFF REPAIR Right    • FEMUR ORIF Left     ORIF, Femur   • INGUINAL HERNIA REPAIR Left    • OTHER CARDIAC SURGERY      reports has had 6 cardioversions        SOCIAL HISTORY  Social History     Tobacco Use   • Smoking status: Former Smoker     Years: 10.00     Types: Cigarettes     Last attempt to quit: 1987     Years since quittin.9   • Smokeless tobacco: Never Used   Substance and Sexual Activity   • Alcohol use: Yes     Alcohol/week: 0.0 oz     Comment: 5 per week   • Drug use: No   • Sexual activity: Not on file        FAMILY HISTORY  Family History   Problem Relation Age of Onset   • Cancer Mother    • Cancer Father    • Cancer Brother         CURRENT MEDICATIONS  Home Medications    **Home medications have not yet been reviewed for this encounter**          ALLERGIES  Allergies   Allergen Reactions   • Cat Hair Extract Shortness of Breath     \"wheezing\"        PHYSICAL EXAM  VITAL SIGNS: /81   Pulse 73   Temp 36.1 °C (97 °F) (Temporal)   Resp 17   Ht 1.778 m (5' 10\")   Wt 96.8 kg (213 lb 6.5 oz)   SpO2 95%   BMI 30.62 kg/m²  @JULIANA[2001::@     Pulse ox interpretation: 95% I interpret this pulse ox as normal     Cardiac monitor interpretation: Atrial " fibrillation with heart rate in the 130-140s as interpreted by me.  The patient presented with palpitation and cardiac monitor was ordered to monitor for dysrhythmia.    Constitutional: Well developed, well nourished, alert in no apparent distress, nontoxic appearance    HENT: No external signs of trauma, normocephalic, oropharynx moist and clear, nose normal    Eyes: PERRL, conjunctiva without erythema, no discharge, no icterus    Neck: Soft and supple, trachea midline, no stridor, no tenderness, no LAD, no JVD, no thyromegaly noted, good ROM    Cardiovascular: Tachycardic and irregular irregular, no murmurs/rubs/gallops, strong distal pulses and good perfusion    Thorax & Lungs: No respiratory distress, CTAB   Abdomen: Soft, nontender, nondistended, no guarding, no rebound, normal BS    Extremities: No cyanosis, no edema, no gross deformity, good ROM, no tenderness, intact distal pulses with brisk cap refill    Skin: Warm, dry, no pallor/cyanosis, no rash noted    Lymphatic: No lymphadenopathy noted    Neuro: A/O times 3, no focal deficits noted    Psychiatric: Cooperative, normal mood and affect, normal judgement, appropriate for clinical situation        DIAGNOSTIC STUDIES / PROCEDURES    Conscious Sedation Procedure    Indication: cardioversion    Consent: I have discussed with the patient and/or the patient representative the indication, alternatives, and the possible risks and/or complications of the planned procedure and the anesthesia methods. The patient and/or patient representative appear to understand and agree to proceed.    Physician Involvement: The attending physician was present and supervising this procedure.    Pre-Sedation Documentation and Exam: I have personally completed a history, physical exam & review of systems for this patient (see notes).  Airway Assessment: Mallampati Class II - (soft palate, fauces & uvula are visible)    Prior History of Anesthesia Complications: none    ASA  Classification: Class 2 - A normal healthy patient with mild systemic disease    Sedation/ Anesthesia Plan: intravenous sedation    Medications Used: propofol 100 mg intravenously    Monitoring and Safety: The patient was placed on a cardiac monitor and vital signs, pulse oximetry and level of consciousness were continuously evaluated throughout the procedure. The patient was closely monitored until recovery from the medications was complete and the patient had returned to baseline status. Respiratory therapy was on standby at all times during the procedure.    Post-Sedation Vital Signs: Vital signs were reviewed and were stable after the procedure (see flow sheet for vitals)            Post-Sedation Exam: Back to baseline           Complications: none        Cardioversion Procedure    Indication: atrial fibrillation    Consent: The patient was counseled regarding the procedure, its indications, risks, potential complications and alternatives, and any questions were answered. Consent was obtained to proceed.    Pre-Medication: propofol 100 mg intravenously    Procedure: The patient was placed in the supine position and the chest area was exposed. The cardioversion pads were applied in the standard manner and configuration.    Attempt #1: The defibrillator was set on the synchronous mode and charged to 200 joules.  A charge was then delivered which resulted in conversion to normal sinus rhythm.    Attempt #2: Not necessary    Attempt #3: Not necessary    The patient tolerated the procedure well.    Complications: None        EKG  12 Lead EKG obtained at 2239 and interpreted by me:   Rate: 144   Rhythm: Atrial fibrillation  Intervals: Normal   Axis: LAD  QRS: Normal   ST segments: ST depression lateral leads  T Waves: Normal    Clinical Impression: Atrial fibrillation with RVR  Compared to October 2, 2019 when patient was seen in sinus rhythm after cardioversion of his atrial fibrillation    EKG  12 Lead EKG obtained  at 0040 and interpreted by me:   Rate: 86   Rhythm: Sinus rhythm   Ectopy: None  Intervals: Normal   Axis: LAD  QRS: Normal   ST segments: Normal  T Waves: Normal    Clinical Impression: Sinus rhythm without acute ischemic changes or dysrhythmia  Compared to earlier EKG with conversion to normal sinus rhythm        LABS  All labs reviewed by me.     Results for orders placed or performed during the hospital encounter of 12/16/19   CBC WITH DIFFERENTIAL   Result Value Ref Range    WBC 7.2 4.8 - 10.8 K/uL    RBC 5.61 4.70 - 6.10 M/uL    Hemoglobin 16.2 14.0 - 18.0 g/dL    Hematocrit 49.9 42.0 - 52.0 %    MCV 88.9 81.4 - 97.8 fL    MCH 28.9 27.0 - 33.0 pg    MCHC 32.5 (L) 33.7 - 35.3 g/dL    RDW 42.5 35.9 - 50.0 fL    Platelet Count 195 164 - 446 K/uL    MPV 10.2 9.0 - 12.9 fL    Neutrophils-Polys 53.30 44.00 - 72.00 %    Lymphocytes 34.00 22.00 - 41.00 %    Monocytes 7.40 0.00 - 13.40 %    Eosinophils 4.30 0.00 - 6.90 %    Basophils 0.70 0.00 - 1.80 %    Immature Granulocytes 0.30 0.00 - 0.90 %    Nucleated RBC 0.00 /100 WBC    Neutrophils (Absolute) 3.83 1.82 - 7.42 K/uL    Lymphs (Absolute) 2.44 1.00 - 4.80 K/uL    Monos (Absolute) 0.53 0.00 - 0.85 K/uL    Eos (Absolute) 0.31 0.00 - 0.51 K/uL    Baso (Absolute) 0.05 0.00 - 0.12 K/uL    Immature Granulocytes (abs) 0.02 0.00 - 0.11 K/uL    NRBC (Absolute) 0.00 K/uL   COMP METABOLIC PANEL   Result Value Ref Range    Sodium 139 135 - 145 mmol/L    Potassium 4.2 3.6 - 5.5 mmol/L    Chloride 99 96 - 112 mmol/L    Co2 26 20 - 33 mmol/L    Anion Gap 14.0 (H) 0.0 - 11.9    Glucose 196 (H) 65 - 99 mg/dL    Bun 12 8 - 22 mg/dL    Creatinine 0.86 0.50 - 1.40 mg/dL    Calcium 9.6 8.4 - 10.2 mg/dL    AST(SGOT) 21 12 - 45 U/L    ALT(SGPT) 8 2 - 50 U/L    Alkaline Phosphatase 97 30 - 99 U/L    Total Bilirubin 0.5 0.1 - 1.5 mg/dL    Albumin 4.7 3.2 - 4.9 g/dL    Total Protein 7.5 6.0 - 8.2 g/dL    Globulin 2.8 1.9 - 3.5 g/dL    A-G Ratio 1.7 g/dL   TROPONIN   Result Value Ref Range     Troponin T 14 6 - 19 ng/L   MAGNESIUM   Result Value Ref Range    Magnesium 2.2 1.5 - 2.5 mg/dL   TSH   Result Value Ref Range    TSH 3.750 0.380 - 5.330 uIU/mL   ESTIMATED GFR   Result Value Ref Range    GFR If African American >60 >60 mL/min/1.73 m 2    GFR If Non African American >60 >60 mL/min/1.73 m 2   EKG   Result Value Ref Range    Report       [facility] Emergency Dept.    Test Date:  2019  Pt Name:    MARK JENKINS                   Department: [facility]  MRN:        [id number]                      Room:  Gender:     Male                         Technician: MAGY  :        1940                   Requested By:ANA MCDANIEL  Order #:    [id number]                    Reading MD:    Measurements  Intervals                                Axis  Rate:       144                          P:  MN:                                      QRS:        -27  QRSD:       91                           T:          79  QT:         292  QTc:        452    Interpretive Statements  Atrial fibrillation with rapid V-rate  Borderline left axis deviation  ST depression, probably rate related  Compared to ECG 10/15/2019 04:41:57  ST (T wave) deviation now present  Sinus rhythm no longer present  Atrial premature complex(es) no longer present     EKG (NOW)   Result Value Ref Range    Report       [facility] Emergency Dept.    Test Date:  2019  Pt Name:    MARK JENKINS                   Department: [facility]  MRN:        [id number]                      [facility]  Gender:     Male                         Technician: MAGY  :        1940                   Requested By:ANA MCDANIEL  Order #:    [id number]                    Reading MD:    Measurements  Intervals                                Axis  Rate:       86                           P:          41  MN:         164                          QRS:        -29  QRSD:       72                           T:          44  QT:          352  QTc:        421    Interpretive Statements  SINUS RHYTHM  BORDERLINE LEFT AXIS DEVIATION  Compared to ECG 12/16/2019 22:39:57  Atrial fibrillation no longer present  ST (T wave) deviation no longer present          RADIOLOGY  The radiologist's interpretation of all radiological studies have been reviewed by me.     DX-CHEST-2 VIEWS   Final Result         1.  No acute cardiopulmonary disease.             COURSE & MEDICAL DECISION MAKING  Nursing notes, VS, PMSFHx reviewed in chart.     Review of past medical records shows the patient was last seen in this ED October 15, 2019 for palpitations and found to have atrial for ablation and was cardioverted in the ED to sinus rhythm.      Differential diagnoses considered include but are not limited to: Atrial fib/flutter, SVT, ventricular tachycardia, WPW, Brugada sydrome, prolonged QT syndrome, PAC, PVC, AMI, CHF, valvular heart disease, thyroid disorder, electrolyte abnormality       [id number]      History and physical exam as above.  Initial EKG showed atrial fibrillation with RVR.  Chest x-ray without evidence for acute abnormality.  Labs are fairly unremarkable except for hyperglycemia likely stress reaction without evidence for DKA.  Patient requested electrical cardioversion which was performed as above under procedural sedation and converted to normal sinus rhythm without any complications.  Patient was monitored in the ED until recovering to baseline.  He would like to be started on a beta-blocker and will be prescribed metoprolol.  He was also advised to take baby aspirin daily.  He will be given outpatient referral to cardiology for follow-up.  Return to ED precautions were given.  Patient and his wife verbalized understanding and agreed with plan of care with no further questions or concerns.      CRITICAL CARE NOTE:  Total critical care time spent on this patient was 30 minutes exclusive of separately billable procedures.  This may include direct  bedside patient care, speaking with family members, review of past medical records, reviewing the results of laboratory/diagnostic studies, consulting with other physicians, as well as evaluating the response to the therapy instituted.        The patient is referred to a primary physician for blood pressure management, diabetic screening, and for all other preventative health concerns.       FINAL IMPRESSION  1. Atrial fibrillation with rapid ventricular response (HCC) Acute   2. Paroxysmal atrial fibrillation (HCC) Chronic          DISPOSITION  Patient will be discharged home in stable condition.       FOLLOW UP  Sean GARCIA M.D.  [address]  [place] [address]  [phone]    Call today      Vinh Fontenot M.D.  [address]  [place] [id number] [phone]    Call today      [facility], Emergency Dept  [address]  [place] [address] [phone]    If symptoms worsen         OUTPATIENT MEDICATIONS  Discharge Medication List as of 12/17/2019  1:30 AM      START taking these medications    Details   metoprolol (LOPRESSOR) 25 MG Tab Take 1 Tab by mouth 2 times a day., Disp-60 Tab, R-0, Print Rx Paper                Electronically signed by: Marcell Uribe, 12/16/2019 10:43 PM      Portions of this record were made with voice recognition software.  Despite my review, spelling/grammar/context errors may still remain.  Interpretation of this chart should be taken in this context.

## 2019-12-17 NOTE — ED NOTES
Patient resting in NAD. Asymptomatic at this time. A/Ox4, MAEW, conversing appropriately. Denies needs. VSS. Wife at bedside. Call light in reach.

## 2019-12-17 NOTE — FLOWSHEET NOTE
12/16/19 9168   Chest Exam   Respiration 18   Pulse (!) 156   Oximetry   Continuous Oximetry Yes   Oxygen   Pulse Oximetry 96 %   O2 (LPM) 5   ETCO2 (mmHg) 34   O2 Daily Delivery Respiratory  Nasal Cannula   Events   Conscious Sedation Smart Text Completed? Yes

## 2019-12-17 NOTE — ED NOTES
Synchronized cardioversion at 200j as directed by Dr Uribe at bedside. Cardiac Rhythm converted to NSR after cardioversion.

## 2019-12-17 NOTE — ED NOTES
Patient mentation is at baseline. Ambulated around department without any symptoms. VSS.   Written and verbal discharge instructions given to patient. Patient acknowledges and reports understanding of instructions.  Patient is agreeable to discharge at this time.  D/C to home with wife driving.

## 2019-12-17 NOTE — TELEPHONE ENCOUNTER
Called patient to see if he has undergone any cardiac testing since seeing Dr. Key's in 2015. He states he has not seen a cardiologist or had any cardiac testing since he was seen here last.     Confirmed appt date and time. -SHARLENE

## 2020-01-02 ENCOUNTER — OFFICE VISIT (OUTPATIENT)
Dept: CARDIOLOGY | Facility: MEDICAL CENTER | Age: 80
End: 2020-01-02
Payer: MEDICARE

## 2020-01-02 VITALS
OXYGEN SATURATION: 96 % | HEIGHT: 70 IN | BODY MASS INDEX: 29.78 KG/M2 | SYSTOLIC BLOOD PRESSURE: 166 MMHG | WEIGHT: 208 LBS | HEART RATE: 66 BPM | DIASTOLIC BLOOD PRESSURE: 86 MMHG

## 2020-01-02 DIAGNOSIS — R03.0 ELEVATED BLOOD PRESSURE READING: ICD-10-CM

## 2020-01-02 DIAGNOSIS — Z79.899 ENCOUNTER FOR MONITORING SOTALOL THERAPY: ICD-10-CM

## 2020-01-02 DIAGNOSIS — I48.0 PAF (PAROXYSMAL ATRIAL FIBRILLATION) (HCC): ICD-10-CM

## 2020-01-02 DIAGNOSIS — Z51.81 ENCOUNTER FOR MONITORING SOTALOL THERAPY: ICD-10-CM

## 2020-01-02 PROCEDURE — 99204 OFFICE O/P NEW MOD 45 MIN: CPT | Performed by: INTERNAL MEDICINE

## 2020-01-02 RX ORDER — SOTALOL HYDROCHLORIDE 80 MG/1
80 TABLET ORAL 2 TIMES DAILY
Qty: 60 TAB | Refills: 3 | Status: SHIPPED
Start: 2020-01-02 | End: 2020-02-11

## 2020-01-02 ASSESSMENT — ENCOUNTER SYMPTOMS
BLOOD IN STOOL: 0
BACK PAIN: 1
ROS GI COMMENTS: OCCASIONAL BLOATING
PALPITATIONS: 1
SHORTNESS OF BREATH: 0
TREMORS: 1
BRUISES/BLEEDS EASILY: 0
WEIGHT LOSS: 0

## 2020-01-03 ENCOUNTER — HOSPITAL ENCOUNTER (OUTPATIENT)
Dept: LAB | Facility: MEDICAL CENTER | Age: 80
End: 2020-01-03
Attending: INTERNAL MEDICINE
Payer: MEDICARE

## 2020-01-03 ENCOUNTER — HOSPITAL ENCOUNTER (OUTPATIENT)
Dept: CARDIOLOGY | Facility: MEDICAL CENTER | Age: 80
End: 2020-01-03
Attending: INTERNAL MEDICINE
Payer: MEDICARE

## 2020-01-03 DIAGNOSIS — I48.0 PAF (PAROXYSMAL ATRIAL FIBRILLATION) (HCC): ICD-10-CM

## 2020-01-03 LAB
ANION GAP SERPL CALC-SCNC: 7 MMOL/L (ref 0–11.9)
BUN SERPL-MCNC: 11 MG/DL (ref 8–22)
CALCIUM SERPL-MCNC: 9.5 MG/DL (ref 8.5–10.5)
CHLORIDE SERPL-SCNC: 103 MMOL/L (ref 96–112)
CO2 SERPL-SCNC: 29 MMOL/L (ref 20–33)
CREAT SERPL-MCNC: 1.03 MG/DL (ref 0.5–1.4)
EKG IMPRESSION: NORMAL
FASTING STATUS PATIENT QL REPORTED: NORMAL
GLUCOSE SERPL-MCNC: 129 MG/DL (ref 65–99)
MAGNESIUM SERPL-MCNC: 2.1 MG/DL (ref 1.5–2.5)
POTASSIUM SERPL-SCNC: 4.5 MMOL/L (ref 3.6–5.5)
SODIUM SERPL-SCNC: 139 MMOL/L (ref 135–145)

## 2020-01-03 PROCEDURE — 83735 ASSAY OF MAGNESIUM: CPT

## 2020-01-03 PROCEDURE — 93010 ELECTROCARDIOGRAM REPORT: CPT | Performed by: INTERNAL MEDICINE

## 2020-01-03 PROCEDURE — 80048 BASIC METABOLIC PNL TOTAL CA: CPT

## 2020-01-03 PROCEDURE — 36415 COLL VENOUS BLD VENIPUNCTURE: CPT

## 2020-01-03 PROCEDURE — 93005 ELECTROCARDIOGRAM TRACING: CPT | Performed by: INTERNAL MEDICINE

## 2020-01-03 NOTE — PROGRESS NOTES
"Chief Complaint   Patient presents with   • Atrial Fibrillation, paroxymal            Subjective:   Hamlet Del Cid is a 79 y.o. male who presents today for evaluation of recent worsening of his atrial fibrillation     He is seen as a new patient.  He has long history of AF which began since his early 30s.  He stated that he consumed a large amount of alcohol at that time.  It persisted for a number of day before he sought medical attention.  Since then he has been experiencing intermittent but rather infrequent recurrences.  It was occurring every 2 years or so without any medication.  However he required 2 cardioversions this last 2 months and became concerned.  He feels that it always happens at night especially with mental stress and \"full stomach\"  He denies heavy alcoholic intake but admitted to moderate consumption around 6 drinks a week in the past and currently about 4 drinks a week.    He has been seen by several cardiologist over the year.    Most recent office visit here was in October 2015 by Dr. Ornelas.  There was no clear etiology identified.  He had EPS and cardiac cath at [facility] in late 1980  Last major cardiac evaluation was 5/2013 when he had negative stress echo.  I am unable to locate any recent echocardiography.    There is no family history of atrial fibrillation.    He denies any other major medical problem except throat cancer.    Most recent thyroid function test was this month.    Past Medical History:   Diagnosis Date   • Arthritis     bilateral knees   • Bronchitis    • Cancer (HCC) 2003    throat Squamous Cell, with chemo/radiation, lymph nodes removal   • PAF (paroxysmal atrial fibrillation) ([facility])     denies taking any meds, cardioversion last one was 2017, HX of SVT   • Parkinson disease (HCC)     declines to take meds   • Pneumonia    • SVT (supraventricular tachycardia) ([facility])     denies taking any meds,cardioversion     Past Surgical History:   Procedure Laterality " Date   • BLEPHAROPLASTY Bilateral 2019    Procedure: BLEPHAROPLASTY- UPPER;  Surgeon: Jos Baron M.D.;  Location: SURGERY [facility];  Service: Plastics   • BROW LIFT Right 2019    Procedure: RHYTIDECTOMY, FOREHEAD- DIRECT BROW LIFT;  Surgeon: Jos Baron M.D.;  Location: SURGERY [facility];  Service: Plastics   • INGUINAL HERNIA REPAIR Bilateral 3/31/2017    Procedure: INGUINAL HERNIA REPAIR W/MESH;  Surgeon: Michael Malin M.D.;  Location: SURGERY SAME DAY [facility];  Service:    • UMBILICAL HERNIA REPAIR N/A 3/31/2017    Procedure: UMBILICAL HERNIA REPAIR W/MESH;  Surgeon: Michael Malin M.D.;  Location: SURGERY SAME DAY [facility];  Service:    • NECK DISSECTION     • HIP ARTHROPLASTY TOTAL Right     Hip Replacement, Total   • SHOULDER ARTHROSCOPY W/ ROTATOR CUFF REPAIR Right    • FEMUR ORIF Left     ORIF, Femur   • INGUINAL HERNIA REPAIR Left    • OTHER CARDIAC SURGERY      reports has had 6 cardioversions     Family History   Problem Relation Age of Onset   • Cancer Mother    • Cancer Father    • Cancer Brother      Social History     Socioeconomic History   • Marital status:      Spouse name: Not on file   • Number of children: Not on file   • Years of education: Not on file   • Highest education level: Not on file   Occupational History   • Not on file   Social Needs   • Financial resource strain: Not on file   • Food insecurity:     Worry: Not on file     Inability: Not on file   • Transportation needs:     Medical: Not on file     Non-medical: Not on file   Tobacco Use   • Smoking status: Former Smoker     Years: 10.00     Types: Cigarettes     Last attempt to quit: 1987     Years since quittin.0   • Smokeless tobacco: Never Used   Substance and Sexual Activity   • Alcohol use: Yes     Alcohol/week: 0.0 oz     Comment: 5 per week   • Drug use: No   • Sexual activity: Not on file   Lifestyle   • Physical activity:     Days per week:  "Not on file     Minutes per session: Not on file   • Stress: Not on file   Relationships   • Social connections:     Talks on phone: Not on file     Gets together: Not on file     Attends Jainism service: Not on file     Active member of club or organization: Not on file     Attends meetings of clubs or organizations: Not on file     Relationship status: Not on file   • Intimate partner violence:     Fear of current or ex partner: Not on file     Emotionally abused: Not on file     Physically abused: Not on file     Forced sexual activity: Not on file   Other Topics Concern   • Not on file   Social History Narrative   • Not on file     Allergies   Allergen Reactions   • Cat Hair Extract Shortness of Breath     \"wheezing\"     Outpatient Encounter Medications as of 1/2/2020   Medication Sig Dispense Refill   • sotalol (BETAPACE) 80 MG Tab Take 1 Tab by mouth 2 times a day. 60 Tab 3   • [DISCONTINUED] metoprolol (LOPRESSOR) 25 MG Tab Take 1 Tab by mouth 2 times a day. 60 Tab 0   • carbidopa-levodopa (SINEMET)  MG Tab Take 1 Tab by mouth 3 times a day.       No facility-administered encounter medications on file as of 1/2/2020.      Review of Systems   Constitutional: Negative for malaise/fatigue and weight loss.   HENT: Negative for nosebleeds.    Respiratory: Negative for shortness of breath.         No snoring, daytime somnolence   Cardiovascular: Positive for palpitations. Negative for chest pain and leg swelling.   Gastrointestinal: Negative for blood in stool.        Occasional bloating   Genitourinary: Negative for hematuria.   Musculoskeletal: Positive for back pain.   Neurological: Positive for tremors.   Endo/Heme/Allergies: Does not bruise/bleed easily.   All other systems reviewed and are negative.       Objective:   BP (!) 166/86 (BP Location: Left arm, Patient Position: Sitting)   Pulse 66   Ht 1.778 m (5' 10\")   Wt 94.3 kg (208 lb)   SpO2 96%   BMI 29.84 kg/m²     Physical Exam "   Constitutional: He is oriented to person, place, and time. He appears well-developed and well-nourished.   Neck: No JVD present.   Cardiovascular: Normal rate and regular rhythm. Exam reveals no gallop.   No murmur heard.  Pulmonary/Chest: Effort normal and breath sounds normal. No respiratory distress. He has no wheezes. He has no rales.   Abdominal: Soft. He exhibits no distension. There is no tenderness.   Musculoskeletal:         General: No edema.   Neurological: He is alert and oriented to person, place, and time.   resting tremor   Skin: Skin is warm and dry. No erythema.   Psychiatric: He has a normal mood and affect. His behavior is normal.     EKG from December 16 by my review shows sinus rhythm rate 86 bpm with borderline left axis deviation otherwise unremarkable with corrected QT of 421    Assessment:     1. PAF (paroxysmal atrial fibrillation) ([facility])  EC-ECHOCARDIOGRAM COMPLETE W/ CONT    MAGNESIUM    Basic Metabolic Panel   2. Elevated blood pressure reading         Medical Decision Making:  Today's Assessment / Status / Plan:     I informed him that his atrial fibrillation could be in part related to alcoholic consumption.  I advised him to reduce alcoholic intake to less than 2 to 3 drinks a week or best to stop it completely.   I also advised him to undergo echocardiography to rule out any structural heart disease and reassess cardiac function.   We had a lengthy discussion about his management option.  At this time we decided to start him on sotalol 80 mg twice a day.  Will obtain basic metabolic panel and magnesium tomorrow.    He is to return in a few days for outpatient EKG.  He prefer not to start anticoagulation at this time.  I informed him that I would strongly advise him to start anticoagulation if his atrial fibrillation recurs.  We will see him back in a few months for follow-up.   We will keep you posted about our findings and further recommendations as they become available. Please  also do not hesitate to call for any questions.  Thank you kindly for allowing me to participate in the care of this patient.

## 2020-01-06 ENCOUNTER — APPOINTMENT (OUTPATIENT)
Dept: CARDIOLOGY | Facility: MEDICAL CENTER | Age: 80
End: 2020-01-06
Payer: MEDICARE

## 2020-01-06 ENCOUNTER — TELEPHONE (OUTPATIENT)
Dept: CARDIOLOGY | Facility: MEDICAL CENTER | Age: 80
End: 2020-01-06

## 2020-01-06 NOTE — TELEPHONE ENCOUNTER
Patient had EKG appt for today, didn't start Sotalol, wanted to wait until he has echo? He stated worried about side effects of Sotalol? He feels he is in Sinus Rhythm. Didn't want to have EKG since he hasn't started Sotalol.

## 2020-01-06 NOTE — TELEPHONE ENCOUNTER
Reviewed telephone note from Vera signed today 01/06/2020.    Attempted to call pt, unable to reach.  Left voicemail regarding MD recommendations and to return this call at his earliest convenience.    Pt update relayed to MD.    ----------------------  EKG   Received: Today Message Contents   Vera Lee, Catrachito Ass't  Roma Bartlett R.N.     Good Morning Roma     Please look at note in pt's chart.     Thank You,   Vera

## 2020-01-07 NOTE — TELEPHONE ENCOUNTER
Patient Call   Sonja Zhang M.D.  You 23 hours ago (12:48 PM)      May wait to start sotalol after his ECHO      2nd attempt to call pt, unable to reach.  Left detailed voicemail regarding MD recommendations and to return this call at his earliest convenience to discuss his decision.

## 2020-01-09 NOTE — TELEPHONE ENCOUNTER
Pt returning your call, states he has heard your msgs but still wants to wait till echo is done. States he is not available to talk.

## 2020-01-10 ENCOUNTER — HOSPITAL ENCOUNTER (OUTPATIENT)
Dept: CARDIOLOGY | Facility: MEDICAL CENTER | Age: 80
End: 2020-01-10
Attending: INTERNAL MEDICINE
Payer: MEDICARE

## 2020-01-10 DIAGNOSIS — I48.0 PAF (PAROXYSMAL ATRIAL FIBRILLATION) (HCC): ICD-10-CM

## 2020-01-10 LAB
LV EJECT FRACT  99904: 70
LV EJECT FRACT MOD 2C 99903: 71.57
LV EJECT FRACT MOD 4C 99902: 68.38
LV EJECT FRACT MOD BP 99901: 70.23

## 2020-01-10 PROCEDURE — 93306 TTE W/DOPPLER COMPLETE: CPT

## 2020-01-10 PROCEDURE — 93306 TTE W/DOPPLER COMPLETE: CPT | Mod: 26 | Performed by: INTERNAL MEDICINE

## 2020-01-29 ENCOUNTER — TELEPHONE (OUTPATIENT)
Dept: CARDIOLOGY | Facility: MEDICAL CENTER | Age: 80
End: 2020-01-29

## 2020-01-29 NOTE — TELEPHONE ENCOUNTER
Attempted to call pt, unable to reach.  Left detailed voicemail regarding MD recommendations and to return this call with his decision regarding starting Sotalol based on testing results.    ------------------------  Associated Results   Result Notes for Basic Metabolic Panel Message  Received: 3 weeks ago Message Contents   RACHEL Leone R.N.     Labs NL   Ordered in order to start sotalol   No change needed      Result Notes for EC-ECHOCARDIOGRAM COMPLETE W/O CONT   Message Received: 2 weeks ago Message Contents   RACHEL Leone R.N.     ECHO normal

## 2020-01-30 ENCOUNTER — TELEPHONE (OUTPATIENT)
Dept: CARDIOLOGY | Facility: MEDICAL CENTER | Age: 80
End: 2020-01-30

## 2020-01-31 NOTE — TELEPHONE ENCOUNTER
Returned pt call and reviewed findings.Decided to stay with pt FV with MD on 2/11/2020.  He states he told his decision to the .  Informed pt that it was not charted from the .  He is appreciative of information given and states no other concerns or questions at this time.

## 2020-02-11 ENCOUNTER — OFFICE VISIT (OUTPATIENT)
Dept: CARDIOLOGY | Facility: MEDICAL CENTER | Age: 80
End: 2020-02-11
Payer: MEDICARE

## 2020-02-11 VITALS
WEIGHT: 208 LBS | DIASTOLIC BLOOD PRESSURE: 84 MMHG | SYSTOLIC BLOOD PRESSURE: 156 MMHG | HEIGHT: 70 IN | HEART RATE: 74 BPM | OXYGEN SATURATION: 98 % | BODY MASS INDEX: 29.78 KG/M2

## 2020-02-11 DIAGNOSIS — I48.0 PAF (PAROXYSMAL ATRIAL FIBRILLATION) (HCC): ICD-10-CM

## 2020-02-11 PROBLEM — I65.23 CAROTID ARTERY PLAQUE, BILATERAL: Status: ACTIVE | Noted: 2018-10-18

## 2020-02-11 PROCEDURE — 99214 OFFICE O/P EST MOD 30 MIN: CPT | Performed by: INTERNAL MEDICINE

## 2020-02-11 ASSESSMENT — ENCOUNTER SYMPTOMS
RESPIRATORY NEGATIVE: 1
SENSORY CHANGE: 0
CARDIOVASCULAR NEGATIVE: 1
VOMITING: 0
DIZZINESS: 0
BRUISES/BLEEDS EASILY: 0
WEIGHT LOSS: 0
PALPITATIONS: 0
INSOMNIA: 0
FOCAL WEAKNESS: 0
NAUSEA: 0
HEARTBURN: 0

## 2020-02-11 NOTE — PROGRESS NOTES
Chief Complaint   Patient presents with   • Atrial Fibrillation     follow up       Subjective:   Hamlet Del Cid is a 79 y.o. male who presents today for follow-up above issue    He has long history of AF which began since his early 30s.  It was rather infrequent, occurring every 2 years or so without any medication.  He has been seen by several cardiologist over the year.    Most recent office visit here was in October 2015 by Dr. Ornelas.  There was no clear etiology identified.  [facility]  Last major cardiac evaluation was 5/2013 when he had negative stress echo.    However he required 2 cardioversions over 2 months earlier this year which prompted his concern.  Both incidents happened in the evening with full stomach.  Underwent echocardiography about a month ago which was essentially unremarkable.  He decided not to start sotalol so far and has not had any recurrence only on metoprolol 25 mg daily   He at this point does not wish to start any anticoagulation      Past Medical History:   Diagnosis Date   • Arthritis     bilateral knees   • Bronchitis    • Cancer ([facility]) 2003    throat Squamous Cell, with chemo/radiation, lymph nodes removal   • PAF (paroxysmal atrial fibrillation) ([facility])     denies taking any meds, cardioversion last one was 2017, HX of SVT   • Parkinson disease ([facility])     declines to take meds   • Pneumonia    • SVT (supraventricular tachycardia) ([facility])     denies taking any meds,cardioversion     Past Surgical History:   Procedure Laterality Date   • BLEPHAROPLASTY Bilateral 7/29/2019    Procedure: BLEPHAROPLASTY- UPPER;  Surgeon: Jos Baron M.D.;  Location: SURGERY [facility];  Service: Plastics   • BROW LIFT Right 7/29/2019    Procedure: RHYTIDECTOMY, FOREHEAD- DIRECT BROW LIFT;  Surgeon: Jos Baron M.D.;  Location: [facility];  Service: Plastics   • INGUINAL HERNIA REPAIR Bilateral 3/31/2017    Procedure:  INGUINAL HERNIA REPAIR W/MESH;  Surgeon: Michael Malin M.D.;  Location: SURGERY SAME DAY [facility];  Service:    • UMBILICAL HERNIA REPAIR N/A 3/31/2017    Procedure: UMBILICAL HERNIA REPAIR W/MESH;  Surgeon: Michael Malin M.D.;  Location: SURGERY SAME DAY [facility];  Service:    • NECK DISSECTION     • HIP ARTHROPLASTY TOTAL Right     Hip Replacement, Total   • SHOULDER ARTHROSCOPY W/ ROTATOR CUFF REPAIR Right    • FEMUR ORIF Left     ORIF, Femur   • INGUINAL HERNIA REPAIR Left    • OTHER CARDIAC SURGERY      reports has had 6 cardioversions     Family History   Problem Relation Age of Onset   • Cancer Mother    • Cancer Father    • Cancer Brother      Social History     Socioeconomic History   • Marital status:      Spouse name: Not on file   • Number of children: Not on file   • Years of education: Not on file   • Highest education level: Not on file   Occupational History   • Not on file   Social Needs   • Financial resource strain: Not on file   • Food insecurity:     Worry: Not on file     Inability: Not on file   • Transportation needs:     Medical: Not on file     Non-medical: Not on file   Tobacco Use   • Smoking status: Former Smoker     Years: 10.00     Types: Cigarettes     Last attempt to quit: 1987     Years since quittin.1   • Smokeless tobacco: Never Used   Substance and Sexual Activity   • Alcohol use: Yes     Alcohol/week: 0.0 oz     Comment: 5 per week   • Drug use: No   • Sexual activity: Not on file   Lifestyle   • Physical activity:     Days per week: Not on file     Minutes per session: Not on file   • Stress: Not on file   Relationships   • Social connections:     Talks on phone: Not on file     Gets together: Not on file     Attends Yazidism service: Not on file     Active member of club or organization: Not on file     Attends meetings of clubs or organizations: Not on file     Relationship status: Not on file   • Intimate partner violence:      "Fear of current or ex partner: Not on file     Emotionally abused: Not on file     Physically abused: Not on file     Forced sexual activity: Not on file   Other Topics Concern   • Not on file   Social History Narrative   • Not on file     Allergies   Allergen Reactions   • Cat Hair Extract Shortness of Breath     \"wheezing\"     Outpatient Encounter Medications as of 2/11/2020   Medication Sig Dispense Refill   • carbidopa-levodopa (SINEMET)  MG Tab Take 1 Tab by mouth 3 times a day.     • sotalol (BETAPACE) 80 MG Tab Take 1 Tab by mouth 2 times a day. (Patient not taking: Reported on 2/11/2020) 60 Tab 3     No facility-administered encounter medications on file as of 2/11/2020.      Review of Systems   Constitutional: Negative for weight loss.   HENT: Negative for nosebleeds.    Respiratory: Negative.    Cardiovascular: Negative.  Negative for chest pain and palpitations.   Gastrointestinal: Negative for heartburn, nausea and vomiting.   Neurological: Negative for dizziness, sensory change and focal weakness.   Endo/Heme/Allergies: Does not bruise/bleed easily.   Psychiatric/Behavioral: The patient does not have insomnia.         Objective:   /84 (BP Location: Left arm, Patient Position: Sitting)   Pulse 74   Ht 1.778 m (5' 10\")   Wt 94.3 kg (208 lb)   SpO2 98%   BMI 29.84 kg/m²     Physical Exam   Constitutional: He is oriented to person, place, and time. He appears well-developed and well-nourished.   Neck: No JVD present.   Cardiovascular: Normal rate and regular rhythm. Exam reveals distant heart sounds. Exam reveals no gallop.   Murmur heard.   Systolic murmur is present with a grade of 2/6.  Pulmonary/Chest: Effort normal and breath sounds normal. No respiratory distress. He has no wheezes. He has no rales.   Abdominal: Soft. He exhibits no distension. There is no tenderness.   Musculoskeletal:         General: No edema.   Neurological: He is alert and oriented to person, place, and time. "   Skin: Skin is warm and dry. No erythema.   Psychiatric: He has a normal mood and affect. His behavior is normal.     BMP nonfasting and magnesium on January 3 was normal except glucose 129    Assessment:     1. PAF (paroxysmal atrial fibrillation) (HCC) infrequent [id number]        Medical Decision Making:  Today's Assessment / Status / Plan:     The patient's above cardiovascular conditions are stable.  He does not appear to have any significant recurrent this last month or so without antiarrhythmic. As mentioned above he prefers to stay on metoprolol and no anticoagulation for now.  Will continue current medications and have the patient return for a followup in 6 months. Will be happy to see the patient sooner as needed. Thank you for allowing me to participate in the caring of this patient.

## 2020-05-14 ENCOUNTER — HOSPITAL ENCOUNTER (EMERGENCY)
Facility: MEDICAL CENTER | Age: 80
End: 2020-05-14
Attending: EMERGENCY MEDICINE
Payer: MEDICARE

## 2020-05-14 VITALS
WEIGHT: 204.15 LBS | SYSTOLIC BLOOD PRESSURE: 155 MMHG | BODY MASS INDEX: 28.58 KG/M2 | DIASTOLIC BLOOD PRESSURE: 98 MMHG | OXYGEN SATURATION: 98 % | TEMPERATURE: 98.5 F | RESPIRATION RATE: 16 BRPM | HEIGHT: 71 IN | HEART RATE: 88 BPM

## 2020-05-14 DIAGNOSIS — I48.91 ATRIAL FIBRILLATION WITH RVR (HCC): ICD-10-CM

## 2020-05-14 LAB
ANION GAP SERPL CALC-SCNC: 14 MMOL/L (ref 7–16)
BASOPHILS # BLD AUTO: 0.3 % (ref 0–1.8)
BASOPHILS # BLD: 0.03 K/UL (ref 0–0.12)
BUN SERPL-MCNC: 13 MG/DL (ref 8–22)
CALCIUM SERPL-MCNC: 9.9 MG/DL (ref 8.4–10.2)
CHLORIDE SERPL-SCNC: 103 MMOL/L (ref 96–112)
CO2 SERPL-SCNC: 25 MMOL/L (ref 20–33)
CREAT SERPL-MCNC: 0.89 MG/DL (ref 0.5–1.4)
EKG IMPRESSION: NORMAL
EKG IMPRESSION: NORMAL
EOSINOPHIL # BLD AUTO: 0.1 K/UL (ref 0–0.51)
EOSINOPHIL NFR BLD: 1.2 % (ref 0–6.9)
ERYTHROCYTE [DISTWIDTH] IN BLOOD BY AUTOMATED COUNT: 42.1 FL (ref 35.9–50)
GLUCOSE SERPL-MCNC: 119 MG/DL (ref 65–99)
HCT VFR BLD AUTO: 48.3 % (ref 42–52)
HGB BLD-MCNC: 15.8 G/DL (ref 14–18)
IMM GRANULOCYTES # BLD AUTO: 0.02 K/UL (ref 0–0.11)
IMM GRANULOCYTES NFR BLD AUTO: 0.2 % (ref 0–0.9)
LYMPHOCYTES # BLD AUTO: 1.92 K/UL (ref 1–4.8)
LYMPHOCYTES NFR BLD: 22.2 % (ref 22–41)
MAGNESIUM SERPL-MCNC: 2.2 MG/DL (ref 1.5–2.5)
MCH RBC QN AUTO: 28.7 PG (ref 27–33)
MCHC RBC AUTO-ENTMCNC: 32.7 G/DL (ref 33.7–35.3)
MCV RBC AUTO: 87.8 FL (ref 81.4–97.8)
MONOCYTES # BLD AUTO: 0.56 K/UL (ref 0–0.85)
MONOCYTES NFR BLD AUTO: 6.5 % (ref 0–13.4)
NEUTROPHILS # BLD AUTO: 6.02 K/UL (ref 1.82–7.42)
NEUTROPHILS NFR BLD: 69.6 % (ref 44–72)
NRBC # BLD AUTO: 0 K/UL
NRBC BLD-RTO: 0 /100 WBC
PLATELET # BLD AUTO: 196 K/UL (ref 164–446)
PMV BLD AUTO: 11 FL (ref 9–12.9)
POTASSIUM SERPL-SCNC: 4.3 MMOL/L (ref 3.6–5.5)
RBC # BLD AUTO: 5.5 M/UL (ref 4.7–6.1)
SODIUM SERPL-SCNC: 142 MMOL/L (ref 135–145)
WBC # BLD AUTO: 8.7 K/UL (ref 4.8–10.8)

## 2020-05-14 PROCEDURE — 700111 HCHG RX REV CODE 636 W/ 250 OVERRIDE (IP): Performed by: EMERGENCY MEDICINE

## 2020-05-14 PROCEDURE — 92960 CARDIOVERSION ELECTRIC EXT: CPT

## 2020-05-14 PROCEDURE — 93005 ELECTROCARDIOGRAM TRACING: CPT | Mod: XE,76 | Performed by: EMERGENCY MEDICINE

## 2020-05-14 PROCEDURE — 83735 ASSAY OF MAGNESIUM: CPT

## 2020-05-14 PROCEDURE — 85025 COMPLETE CBC W/AUTO DIFF WBC: CPT

## 2020-05-14 PROCEDURE — 96374 THER/PROPH/DIAG INJ IV PUSH: CPT | Mod: XU

## 2020-05-14 PROCEDURE — 99285 EMERGENCY DEPT VISIT HI MDM: CPT

## 2020-05-14 PROCEDURE — 94770 HCHG CO2 EXPIRED GAS DETERMINATION: CPT | Mod: XU

## 2020-05-14 PROCEDURE — 80048 BASIC METABOLIC PNL TOTAL CA: CPT

## 2020-05-14 RX ORDER — ETOMIDATE 2 MG/ML
0.15 INJECTION INTRAVENOUS ONCE
Status: COMPLETED | OUTPATIENT
Start: 2020-05-14 | End: 2020-05-14

## 2020-05-14 RX ADMIN — ETOMIDATE 13.8 MG: 2 INJECTION INTRAVENOUS at 20:59

## 2020-05-14 ASSESSMENT — FIBROSIS 4 INDEX: FIB4 SCORE: 2.93

## 2020-05-15 NOTE — FLOWSHEET NOTE
05/14/20 2100   Oxygen   O2 (LPM) 5   O2 Delivery Device Nasal Cannula   Events   Conscious Sedation Smart Text Completed? Yes  (Pt cardioverted with ETCO2 27-34 RR 4-20 and O2 sats  )

## 2020-05-15 NOTE — ED NOTES
Pt md/c home with wife. IV removed. After care and f/u discussed with pt. Pt verbalizes understandings. Pt has all belongings with him. Pt ambulated out of ED independently

## 2020-05-15 NOTE — DISCHARGE INSTRUCTIONS
You were seen in the emergency department for atrial fibrillation.  You were electrically cardioverted successfully.  Please follow-up with your cardiologist.    Please return the emergency department seek medical attention if you develop:  Difficulty breathing, chest pain, rapid heart rate, any other new or concerning findings    ================================  Coronavirus Information    Your visit did NOT relate to coronavirus, but if you or your family develop symptoms that concern you for coronavirus (please see CDC website for symptoms), please contact the [facility] hotline (or your local health department)  or your healthcare provider before going to a medical facility:    [facility]  Daytime hours: [phone]  Anytime: 311    Information is available from the Centers for Disease Control and Prevention  www.CDC.gov    and     [facility]  [email or web address]    If you are severely ill or having a hard time breathing, please immediatly seek medical care. Notify the  or Emergency Department Triage about your symptoms.

## 2020-05-15 NOTE — ED PROVIDER NOTES
ED Provider Note      Means of Arrival: Private vehicle  History obtained from: Patient, medical record      CHIEF COMPLAINT  Chief Complaint   Patient presents with   • Atrial Fibrillation     pt states he has hx of afib and noticed his heart racing. denies chest pain, sob, diaphoresis.        HPI  Hamlet Del Cid is a 79 y.o. male who presents with atrial fibrillation.  Paroxysmal the patient been feeling his palpitations.  He has had these episodes multiple times in the past, requiring multiple electrical cardioversions.  He has reported that antidysrhythmic medications are not successful.  He has discussed with his cardiologist the option for anticoagulation, at this time.  He is requesting electrocardioversion.  Patient denies any chest pain, shortness of breath, lightheadedness, fever, abdominal pain    REVIEW OF SYSTEMS  CONSTITUTIONAL:  No fever.  CARDIOVASCULAR:   See HPI  RESPIRATORY:  No pleuritic chest pain.  GASTROINTESTINAL:  No abdominal pain.  GENITOURINARY:   No dysuria.  MUSCULOSKELETAL:  No arthralgia.  SKIN:  No rash or suspicious lesions.  NEUROLOGIC:   No headache.  ENDOCRINE:  No facial edema.  HEMATOLOGIC:  No abnormal bleeding.       See HPI for further details.   All other systems are negative.     PAST MEDICAL HISTORY  Past Medical History:   Diagnosis Date   • Arthritis     bilateral knees   • Bronchitis    • Cancer (HCC) 2003    throat Squamous Cell, with chemo/radiation, lymph nodes removal   • PAF (paroxysmal atrial fibrillation) (HCC)     denies taking any meds, cardioversion last one was 2017, HX of SVT   • Parkinson disease (HCC)     declines to take meds   • Pneumonia    • SVT (supraventricular tachycardia) ([facility])     denies taking any meds,cardioversion       FAMILY HISTORY  Family History   Problem Relation Age of Onset   • Cancer Mother    • Cancer Father    • Cancer Brother        SOCIAL HISTORY   reports that he quit smoking about 33 years ago. His smoking use  "included cigarettes. He quit after 10.00 years of use. He has never used smokeless tobacco. He reports current alcohol use. He reports that he does not use drugs.    SURGICAL HISTORY  Past Surgical History:   Procedure Laterality Date   • BLEPHAROPLASTY Bilateral 7/29/2019    Procedure: BLEPHAROPLASTY- UPPER;  Surgeon: Jos Baron M.D.;  Location: SURGERY [facility];  Service: Plastics   • BROW LIFT Right 7/29/2019    Procedure: RHYTIDECTOMY, FOREHEAD- DIRECT BROW LIFT;  Surgeon: oJs Baron M.D.;  Location: SURGERY [facility];  Service: Plastics   • INGUINAL HERNIA REPAIR Bilateral 3/31/2017    Procedure: INGUINAL HERNIA REPAIR W/MESH;  Surgeon: Michael Malin M.D.;  Location: SURGERY SAME DAY [facility];  Service:    • UMBILICAL HERNIA REPAIR N/A 3/31/2017    Procedure: UMBILICAL HERNIA REPAIR W/MESH;  Surgeon: Michael Malin M.D.;  Location: SURGERY SAME DAY [facility];  Service:    • NECK DISSECTION  2003   • HIP ARTHROPLASTY TOTAL Right 2002    Hip Replacement, Total   • SHOULDER ARTHROSCOPY W/ ROTATOR CUFF REPAIR Right 1995   • FEMUR ORIF Left 1961    ORIF, Femur   • INGUINAL HERNIA REPAIR Left 1990's   • OTHER CARDIAC SURGERY      reports has had 6 cardioversions       CURRENT MEDICATIONS  Home Medications     Reviewed by James Burnett R.N. (Registered Nurse) on 05/14/20 at 1943  Med List Status: Complete   Medication Last Dose Status   carbidopa-levodopa (SINEMET)  MG Tab 5/14/2020 Active   metoprolol (LOPRESSOR) 25 MG Tab 5/13/2020 Active                ALLERGIES  Allergies   Allergen Reactions   • Cat Hair Extract Shortness of Breath     \"wheezing\"       PHYSICAL EXAM  VITAL SIGNS: /98   Pulse 88   Temp 36.9 °C (98.5 °F) (Temporal)   Resp 16   Ht 1.803 m (5' 11\")   Wt 92.6 kg (204 lb 2.3 oz)   SpO2 98%   BMI 28.47 kg/m²    Con: Comfortable  HENT: Normocephalic, atraumatic, Oropharynx within normal limits  Eyes: Pupils equal, round and reactive to light, " Extraocular muscles intact  Resp: Clear to auscultation, no wheezes, rales or crackles  CV: Tachycardic, irregularly irregular Rate and Rhythm, Normal first and second heart sounds, no murmurs, rubs or gallups.  Abd: Soft, Nontender, Nondistended, no rebound or guarding.  : No costovertebral angle tenderness  MSK: No deformities  Skin: No rash, Warm and dry, No petechiae  Neuro: Speech fluent  Psych: Normal mood/mentation      RADIOLOGY/PROCEDURES  Procedural sedation:  indication: Electrical cardioversion  I have discussed with the patient and/or the patient representative the indication, alternatives, and the possible risks and/or complications of the planned procedure and the anesthesia methods. The patient and/or patient representative appear to understand and agree to proceed. Consent was obtained. An airway assessment was performed.     Mallampati: 2  ASA class: 2   Patient vitals and continuous end-tidal CO2 were monitored during the sedation with RT at beside. Pt placed on NC and the airway box was present. PT received 0.15 mg/kg of etomidate initial with good effect.     Procedure was performed without any complications. Patient tolerated sedation without any complications and was observed until the patient returned to normal alertness. Post sedation vital signes stable.     Post sedation exam: back to baseline  Total sedation time: 15 minutes    Cardioversion  Indication: Atrial fibrillation/atrial flutter  Anesthesia: procedural sedation  Informed consent was obtained.   Pads were placed on the chest and after the patient underwent procedural sedation, synchronized cardioversion was performed with 120 J, which was unsuccessful.  Subsequent shock at 200 J was successful. The patient tolerated the procedure without any immediate complications.      LABS  Labs Reviewed   CBC WITH DIFFERENTIAL - Abnormal; Notable for the following components:       Result Value    MCHC 32.7 (*)     All other components  within normal limits   BASIC METABOLIC PANEL - Abnormal; Notable for the following components:    Glucose 119 (*)     All other components within normal limits   MAGNESIUM   ESTIMATED GFR        EKG  Results for orders placed or performed during the hospital encounter of 20   EKG   Result Value Ref Range    Report       [facility] Emergency Dept.    Test Date:  2020  Pt Name:    MARK JENKINS                   Department: [facility]  MRN:        [id number]                      Room:  Gender:     Male                         Technician: [id number]  :        1940                   Requested By:ER TRIAGE PROTOCOL  Order #:    [id number]                    Reading MD: Robert An    Measurements  Intervals                                Axis  Rate:       155                          P:  NC:                                      QRS:        -7  QRSD:       78                           T:          80  QT:         287  QTc:        461    Interpretive Statements  Atrial fibrillation with rapid V-rate  Abnormal R-wave progression, early transition  ST depression, probably rate related  Baseline wander in lead(s) V1  Compared to ECG 2020 10:34:34  ST (T wave) deviation now present  Sinus bradycardia no longer present  Atrial premature complex(es) no longer present  Electronically Kayla d On 2020 21:07:42 PDT by Robert An     EKG (NOW)   Result Value Ref Range    Report       [facility] Emergency Dept.    Test Date:  2020  Pt Name:    MARK JENKINS                   Department: [facility]  MRN:        [id number]                      Room:       -ROOM 7  Gender:     Male                         Technician: ZL  :        1940                   Requested By:ROBERT AN  Order #:    [id number]                    Reading MD: Robert An    Measurements  Intervals                                Axis  Rate:       90                           P:  NC:                                       QRS:        -42  QRSD:       85                           T:          64  QT:         351  QTc:        430    Interpretive Statements  SINUS RHYTHM  Left axis deviation  Abnormal R-wave progression, early transition  Baseline wander in lead(s) V1  Compared to ECG 05/14/2020 19:39:07  Left-axis deviation now present  Atrial fibrillation no longer present  ST (T wave) deviation no longer present  Electronically Signed On 5- 21:07:38 PDT by Robert Layton           COURSE & MEDICAL DECISION MAKING  Pertinent Labs & Imaging studies reviewed. (See chart for details)    Patient presents with onset of atrial fibrillation.  He is well versed in aware of the risk including stroke.  This was discussed again with him.  Using anticoagulation.  He demonstrates no signs of DVT/PE, no signs of ACS.  No evidence of infection.  Labs demonstrate a low potassium patient request electrocardioversion, which was successful.  He felt improved, repeat EKG demonstrates sinus rhythm, patient ambulated independently with emergency department at the time of discharge.  He has given return precautions, anticipatory guidance, advised to follow-up with his cardiologist.    Appropriate PPE were worn at this encounter.     FINAL IMPRESSION  1. Atrial fibrillation with RVR (HCC)             DISPOSITION:  Patient will be discharged home in stable condition.    FOLLOW UP:  Sonja Zhang M.D.  [address]  [address]  [place] [id number] [phone]    Schedule an appointment as soon as possible for a visit       [facility], Emergency Dept  [address]  [place] [id number] [phone]    If symptoms worsen      OUTPATIENT MEDICATIONS:  Discharge Medication List as of 5/14/2020  9:18 PM

## 2020-05-15 NOTE — ED NOTES
2047- Etomidate given IV 6.9ml  2049- 120J shock delivered   2050- 200J shock delivered   2052- EKG at bedside   2057- Pt alert and awake, able to speak in full sentences. VS: SpO2: 97 HR: 86 BP: 165/99

## 2020-05-15 NOTE — ED TRIAGE NOTES
"Bib wife who will return when patient is ready.     Chief Complaint   Patient presents with   • Atrial Fibrillation     pt states he has hx of afib and noticed his heart racing. denies chest pain, sob, diaphoresis.      /88   Pulse 77   Temp 36.9 °C (98.5 °F) (Temporal)   Resp 16   Ht 1.803 m (5' 11\")   SpO2 95%   BMI 29.01 kg/m²     "

## 2020-05-21 ENCOUNTER — OFFICE VISIT (OUTPATIENT)
Dept: CARDIOLOGY | Facility: MEDICAL CENTER | Age: 80
End: 2020-05-21
Payer: MEDICARE

## 2020-05-21 ENCOUNTER — TELEPHONE (OUTPATIENT)
Dept: CARDIOLOGY | Facility: MEDICAL CENTER | Age: 80
End: 2020-05-21

## 2020-05-21 VITALS
WEIGHT: 202 LBS | BODY MASS INDEX: 28.28 KG/M2 | HEIGHT: 71 IN | HEART RATE: 80 BPM | SYSTOLIC BLOOD PRESSURE: 140 MMHG | DIASTOLIC BLOOD PRESSURE: 84 MMHG

## 2020-05-21 DIAGNOSIS — I48.0 PAF (PAROXYSMAL ATRIAL FIBRILLATION) (HCC): ICD-10-CM

## 2020-05-21 PROCEDURE — 99214 OFFICE O/P EST MOD 30 MIN: CPT | Performed by: INTERNAL MEDICINE

## 2020-05-21 RX ORDER — FLECAINIDE ACETATE 50 MG/1
TABLET ORAL
Qty: 120 TAB | Refills: 3 | Status: SHIPPED | OUTPATIENT
Start: 2020-05-21 | End: 2021-06-03

## 2020-05-21 RX ORDER — FLECAINIDE ACETATE 50 MG/1
50 TABLET ORAL
Qty: 30 TAB | Refills: 3 | Status: SHIPPED | OUTPATIENT
Start: 2020-05-21 | End: 2020-05-21

## 2020-05-21 ASSESSMENT — ENCOUNTER SYMPTOMS
TREMORS: 1
ORTHOPNEA: 0
BLOOD IN STOOL: 0
WEIGHT LOSS: 0
PALPITATIONS: 1
SHORTNESS OF BREATH: 0
NERVOUS/ANXIOUS: 1

## 2020-05-21 ASSESSMENT — FIBROSIS 4 INDEX: FIB4 SCORE: 2.92

## 2020-05-21 NOTE — TELEPHONE ENCOUNTER
"Upon chart review, per MD last OV, \"Flecainide 150-200 mg PRN.\"    3 attempts to call Luiz all with busy signal.    4th attempt to return [place]'s phone call, successfully.  Reviewed findings. Reassurance given that prescription will be re-sent as free text.  She verbalizes understanding and states no other concerns or questions at this time.  Luiz is appreciative of information given.    Flecainide re-ordered and sent to preferred pharmacy.  "

## 2020-05-21 NOTE — LETTER
[facility]   [address]  [place] [address]  Phone: [phone]  Fax: [phone]              Hamlet Del Cid  1940    Encounter Date: 5/21/2020    Sonja Zhang M.D.          PROGRESS NOTE:  Chief Complaint   Patient presents with   • Atrial Fibrillation     follow up       Subjective:   Hamlet Del Cid is a 79 y.o. male who presents today     Used to take flecainide years ago    Past Medical History:   Diagnosis Date   • Arthritis     bilateral knees   • Bronchitis    • Cancer (HCC) 2003    throat Squamous Cell, with chemo/radiation, lymph nodes removal   • PAF (paroxysmal atrial fibrillation) (HCC)     denies taking any meds, cardioversion last one was 2017, HX of SVT   • Parkinson disease (HCC)     declines to take meds   • Pneumonia    • SVT (supraventricular tachycardia) (HCC)     denies taking any meds,cardioversion     Past Surgical History:   Procedure Laterality Date   • BLEPHAROPLASTY Bilateral 7/29/2019    Procedure: BLEPHAROPLASTY- UPPER;  Surgeon: Jos Baron M.D.;  Location: [facility];  Service: Plastics   • BROW LIFT Right 7/29/2019    Procedure: RHYTIDECTOMY, FOREHEAD- DIRECT BROW LIFT;  Surgeon: Jos Baron M.D.;  Location: [facility];  Service: Plastics   • INGUINAL HERNIA REPAIR Bilateral 3/31/2017    Procedure: INGUINAL HERNIA REPAIR W/MESH;  Surgeon: Michael Malin M.D.;  Location: SURGERY SAME DAY [facility];  Service:    • UMBILICAL HERNIA REPAIR N/A 3/31/2017    Procedure: UMBILICAL HERNIA REPAIR W/MESH;  Surgeon: Michael Malin M.D.;  Location: SURGERY SAME DAY [facility];  Service:    • NECK DISSECTION  2003   • HIP ARTHROPLASTY TOTAL Right 2002    Hip Replacement, Total   • SHOULDER ARTHROSCOPY W/ ROTATOR CUFF REPAIR Right 1995   • FEMUR ORIF Left 1961    ORIF, Femur   • INGUINAL HERNIA REPAIR Left 1990's   • OTHER CARDIAC SURGERY      reports has had 6  "cardioversions     Family History   Problem Relation Age of Onset   • Cancer Mother    • Cancer Father    • Cancer Brother      Social History     Socioeconomic History   • Marital status:      Spouse name: Not on file   • Number of children: Not on file   • Years of education: Not on file   • Highest education level: Not on file   Occupational History   • Not on file   Social Needs   • Financial resource strain: Not on file   • Food insecurity     Worry: Not on file     Inability: Not on file   • Transportation needs     Medical: Not on file     Non-medical: Not on file   Tobacco Use   • Smoking status: Former Smoker     Years: 10.00     Types: Cigarettes     Last attempt to quit: 1987     Years since quittin.4   • Smokeless tobacco: Never Used   Substance and Sexual Activity   • Alcohol use: Yes     Alcohol/week: 0.0 oz     Comment: 5 per week   • Drug use: No   • Sexual activity: Not on file   Lifestyle   • Physical activity     Days per week: Not on file     Minutes per session: Not on file   • Stress: Not on file   Relationships   • Social connections     Talks on phone: Not on file     Gets together: Not on file     Attends Sikh service: Not on file     Active member of club or organization: Not on file     Attends meetings of clubs or organizations: Not on file     Relationship status: Not on file   • Intimate partner violence     Fear of current or ex partner: Not on file     Emotionally abused: Not on file     Physically abused: Not on file     Forced sexual activity: Not on file   Other Topics Concern   • Not on file   Social History Narrative   • Not on file     Allergies   Allergen Reactions   • Cat Hair Extract Shortness of Breath     \"wheezing\"     Outpatient Encounter Medications as of 2020   Medication Sig Dispense Refill   • flecainide (TAMBOCOR) 50 MG tablet Take 1 Tab by mouth 1 time daily as needed. 30 Tab 3   • metoprolol (LOPRESSOR) 25 MG Tab Take 1 Tab by mouth 2 " "times a day. (Patient taking differently: Take 25 mg by mouth every day.) 180 Tab 3   • carbidopa-levodopa (SINEMET)  MG Tab Take 1 Tab by mouth 3 times a day.       No facility-administered encounter medications on file as of 5/21/2020.      Review of Systems   Neurological: Positive for tremors.   Psychiatric/Behavioral: The patient is nervous/anxious.         About finances        Objective:   /84 (BP Location: Right arm, Patient Position: Sitting)   Pulse 80   Ht 1.803 m (5' 11\")   Wt 91.6 kg (202 lb)   BMI 28.17 kg/m²     Physical Exam   Constitutional: He is oriented to person, place, and time. He appears well-developed and well-nourished.   Neck: No JVD present.   Cardiovascular: Normal rate and regular rhythm. Exam reveals no gallop.   No murmur heard.  Pulmonary/Chest: Effort normal and breath sounds normal. No respiratory distress. He has no wheezes. He has no rales.   Abdominal: Soft. He exhibits no distension. There is no abdominal tenderness.   Musculoskeletal:         General: No edema.   Neurological: He is alert and oriented to person, place, and time.   +tremor   Skin: Skin is warm and dry. No erythema.   Psychiatric: He has a normal mood and affect. His behavior is normal.       Assessment:     1. PAF (paroxysmal atrial fibrillation) ([facility])  REFERRAL TO CARDIAC ELECTROPHYSIOLOGY       Medical Decision Making:  Today's Assessment / Status / Plan:     Flecainide 150-200 mg PRN  EP referral         No Recipients              "

## 2020-05-21 NOTE — TELEPHONE ENCOUNTER
CR/santiago Dee from [facility] pharmacy calling for clarification of directions, flecainide.    Pt is waiting at pharmacy at this time to  script.    Please call Luiz  x0 asap

## 2020-05-21 NOTE — PROGRESS NOTES
Chief Complaint   Patient presents with   • Atrial Fibrillation     follow up       Subjective:   Hamlet Del Cid is a 79 y.o. male who presents today for evaluation of above issue    He had another episode of atrial fibrillation last week required emergency room visit for cardioversion.  Since last October this is his third episodes of atrial fibrillation.    He is currently taking low-dose metoprolol  At one point we prescribed sotalol for him but he has some concern over side effects and prefers not to do so.  He stated that many years ago he used to take flecainide and thinks he tolerated it well       Past Medical History:   Diagnosis Date   • Arthritis     bilateral knees   • Bronchitis    • Cancer (HCC) 2003    throat Squamous Cell, with chemo/radiation, lymph nodes removal   • PAF (paroxysmal atrial fibrillation) ([facility])     denies taking any meds, cardioversion last one was 2017, HX of SVT   • Parkinson disease ([facility])     declines to take meds   • Pneumonia    • SVT (supraventricular tachycardia) ([facility])     denies taking any meds,cardioversion     Past Surgical History:   Procedure Laterality Date   • BLEPHAROPLASTY Bilateral 7/29/2019    Procedure: BLEPHAROPLASTY- UPPER;  Surgeon: Jos Baron M.D.;  Location: [facility];  Service: Plastics   • BROW LIFT Right 7/29/2019    Procedure: RHYTIDECTOMY, FOREHEAD- DIRECT BROW LIFT;  Surgeon: Jos Baron M.D.;  Location: [facility];  Service: Plastics   • INGUINAL HERNIA REPAIR Bilateral 3/31/2017    Procedure: INGUINAL HERNIA REPAIR W/MESH;  Surgeon: Michael Malin M.D.;  Location: SURGERY SAME DAY [facility];  Service:    • UMBILICAL HERNIA REPAIR N/A 3/31/2017    Procedure: UMBILICAL HERNIA REPAIR W/MESH;  Surgeon: Michael Malin M.D.;  Location: SURGERY SAME DAY [facility];  Service:    • NECK DISSECTION  2003   • HIP ARTHROPLASTY TOTAL Right 2002    Hip Replacement, Total   • SHOULDER ARTHROSCOPY W/ ROTATOR  "CUFF REPAIR Right    • FEMUR ORIF Left     ORIF, Femur   • INGUINAL HERNIA REPAIR Left    • OTHER CARDIAC SURGERY      reports has had 6 cardioversions     Family History   Problem Relation Age of Onset   • Cancer Mother    • Cancer Father    • Cancer Brother      Social History     Socioeconomic History   • Marital status:      Spouse name: Not on file   • Number of children: Not on file   • Years of education: Not on file   • Highest education level: Not on file   Occupational History   • Not on file   Social Needs   • Financial resource strain: Not on file   • Food insecurity     Worry: Not on file     Inability: Not on file   • Transportation needs     Medical: Not on file     Non-medical: Not on file   Tobacco Use   • Smoking status: Former Smoker     Years: 10.00     Types: Cigarettes     Last attempt to quit: 1987     Years since quittin.4   • Smokeless tobacco: Never Used   Substance and Sexual Activity   • Alcohol use: Yes     Alcohol/week: 0.0 oz     Comment: 5 per week   • Drug use: No   • Sexual activity: Not on file   Lifestyle   • Physical activity     Days per week: Not on file     Minutes per session: Not on file   • Stress: Not on file   Relationships   • Social connections     Talks on phone: Not on file     Gets together: Not on file     Attends Voodoo service: Not on file     Active member of club or organization: Not on file     Attends meetings of clubs or organizations: Not on file     Relationship status: Not on file   • Intimate partner violence     Fear of current or ex partner: Not on file     Emotionally abused: Not on file     Physically abused: Not on file     Forced sexual activity: Not on file   Other Topics Concern   • Not on file   Social History Narrative   • Not on file     Allergies   Allergen Reactions   • Cat Hair Extract Shortness of Breath     \"wheezing\"     Outpatient Encounter Medications as of 2020   Medication Sig Dispense Refill   • " "flecainide (TAMBOCOR) 50 MG tablet Take 1 Tab by mouth 1 time daily as needed. 30 Tab 3   • metoprolol (LOPRESSOR) 25 MG Tab Take 1 Tab by mouth 2 times a day. (Patient taking differently: Take 25 mg by mouth every day.) 180 Tab 3   • carbidopa-levodopa (SINEMET)  MG Tab Take 1 Tab by mouth 3 times a day.       No facility-administered encounter medications on file as of 5/21/2020.      Review of Systems   Constitutional: Negative for malaise/fatigue and weight loss.   HENT: Negative for nosebleeds.    Respiratory: Negative for shortness of breath.    Cardiovascular: Positive for palpitations. Negative for chest pain, orthopnea and leg swelling.   Gastrointestinal: Negative for blood in stool.   Genitourinary: Negative for hematuria.   Neurological: Positive for tremors.   Psychiatric/Behavioral: The patient is nervous/anxious.         About finances        Objective:   /84 (BP Location: Right arm, Patient Position: Sitting)   Pulse 80   Ht 1.803 m (5' 11\")   Wt 91.6 kg (202 lb)   BMI 28.17 kg/m²     Physical Exam   Constitutional: He is oriented to person, place, and time. He appears well-developed and well-nourished.   Neck: No JVD present.   Cardiovascular: Normal rate and regular rhythm. Exam reveals no gallop.   No murmur heard.  Pulmonary/Chest: Effort normal and breath sounds normal. No respiratory distress. He has no wheezes. He has no rales.   Abdominal: Soft. He exhibits no distension. There is no abdominal tenderness.   Musculoskeletal:         General: No edema.   Neurological: He is alert and oriented to person, place, and time.   +tremor   Skin: Skin is warm and dry. No erythema.   Psychiatric: He has a normal mood and affect. His behavior is normal.       Assessment:     1. PAF (paroxysmal atrial fibrillation) ([facility])  REFERRAL TO CARDIAC ELECTROPHYSIOLOGY       Medical Decision Making:  Today's Assessment / Status / Plan:     I had a lengthy discussion with him about his treatment " options.  He is not interested in taking any medication on a regular basis but somewhat interested in either PRN medication.  We will try Flecainide 150-200 mg PRN.  He also would like to discuss with our electrophysiologist colleagues regarding pros and cons of radiofrequency ablation  Will arrange for EP referral.  Potential benefit of anticoagulation was also discussed today but he declined to start that at this time.  We will keep you posted about our findings and further recommendations as they become available. Please also do not hesitate to call for any questions.  Thank you kindly for allowing me to participate in the care of this patient.

## 2020-05-22 ENCOUNTER — TELEPHONE (OUTPATIENT)
Dept: CARDIOLOGY | Facility: MEDICAL CENTER | Age: 80
End: 2020-05-22

## 2020-05-22 NOTE — TELEPHONE ENCOUNTER
----- Message from Dipika Stiles R.N. sent at 5/21/2020  3:28 PM PDT -----  EP referral placed by CR.      Thank you!

## 2020-05-27 ENCOUNTER — OFFICE VISIT (OUTPATIENT)
Dept: CARDIOLOGY | Facility: MEDICAL CENTER | Age: 80
End: 2020-05-27
Payer: MEDICARE

## 2020-05-27 VITALS
BODY MASS INDEX: 28.92 KG/M2 | OXYGEN SATURATION: 95 % | HEIGHT: 70 IN | DIASTOLIC BLOOD PRESSURE: 96 MMHG | SYSTOLIC BLOOD PRESSURE: 160 MMHG | WEIGHT: 202 LBS | HEART RATE: 80 BPM

## 2020-05-27 DIAGNOSIS — I48.0 PAF (PAROXYSMAL ATRIAL FIBRILLATION) (HCC): ICD-10-CM

## 2020-05-27 DIAGNOSIS — Z79.899 ENCOUNTER FOR MONITORING SOTALOL THERAPY: ICD-10-CM

## 2020-05-27 DIAGNOSIS — Z51.81 ENCOUNTER FOR MONITORING SOTALOL THERAPY: ICD-10-CM

## 2020-05-27 LAB — EKG IMPRESSION: NORMAL

## 2020-05-27 PROCEDURE — 99205 OFFICE O/P NEW HI 60 MIN: CPT | Performed by: INTERNAL MEDICINE

## 2020-05-27 PROCEDURE — 93000 ELECTROCARDIOGRAM COMPLETE: CPT | Performed by: INTERNAL MEDICINE

## 2020-05-27 ASSESSMENT — FIBROSIS 4 INDEX: FIB4 SCORE: 2.92

## 2020-05-27 NOTE — PROGRESS NOTES
Arrhythmia Clinic Note (New patient)     DOS: 5/27/2020    Referring physician: Dr Zhang    Chief complaint/Reason for consult: Afib    HPI: 79-year-old man with a longstanding history of paroxysmal atrial fibrillation, referred for evaluation.  In the past he has had palpitations, and reports having an electrophysiology study in 1987.  Over the years since, he has intermittently had cardioversions for atrial fibrillation, noting these happen once every few years.  However, over the past several months, he has had cardioversions 3 separate times for sustained atrial fibrillation with RVR.  He was taking only metoprolol 25 mg daily.  He had been resistant to starting anticoagulation despite his advanced age which puts him at higher risk for stroke.  Since his last cardioversion this month, he has not had recurrent palpitations.  He was started on Tambocor 150 to 200 mg dose to take as needed, in addition to his daily metoprolol.  He has not needed to take it yet. he denies chest pain.  No shortness of breath.  He does note that he feels acutely short of breath with palpitations during atrial fibrillation episodes.    ROS (+ highlighted in bold):  Constitutional: Fevers/chills/fatigue/weightloss  HEENT: Blurry vision/eye pain/sore throat/hearing loss  Respiratory: Shortness of breath/cough  Cardiovascular: Chest pain/palpitations/edema/orthopnea/syncope  GI: Nausea/vomitting/diarrhea  MSK: Arthralgias/myagias/muscle weakness  Skin: Rash/sores  Neurological: Numbness/tremors/vertigo  Endocrine: Excessive thirst/polyuria/cold intolerance/heat intolerance  Psych: Depression/anxiety    Past Medical History:   Diagnosis Date   • Arthritis     bilateral knees   • Bronchitis    • Cancer (HCC) 2003    throat Squamous Cell, with chemo/radiation, lymph nodes removal   • PAF (paroxysmal atrial fibrillation) ([facility])     denies taking any meds, cardioversion last one was 2017, HX of SVT   • Parkinson disease ([facility])      declines to take meds   • Pneumonia    • SVT (supraventricular tachycardia) (HCC)     denies taking any meds,cardioversion       Past Surgical History:   Procedure Laterality Date   • BLEPHAROPLASTY Bilateral 2019    Procedure: BLEPHAROPLASTY- UPPER;  Surgeon: Jos Baron M.D.;  Location: SURGERY [facility];  Service: Plastics   • BROW LIFT Right 2019    Procedure: RHYTIDECTOMY, FOREHEAD- DIRECT BROW LIFT;  Surgeon: Jos Baron M.D.;  Location: SURGERY [facility];  Service: Plastics   • INGUINAL HERNIA REPAIR Bilateral 3/31/2017    Procedure: INGUINAL HERNIA REPAIR W/MESH;  Surgeon: Michael Malin M.D.;  Location: SURGERY SAME DAY [facility];  Service:    • UMBILICAL HERNIA REPAIR N/A 3/31/2017    Procedure: UMBILICAL HERNIA REPAIR W/MESH;  Surgeon: Michael Malin M.D.;  Location: SURGERY SAME DAY [facility];  Service:    • NECK DISSECTION     • HIP ARTHROPLASTY TOTAL Right     Hip Replacement, Total   • SHOULDER ARTHROSCOPY W/ ROTATOR CUFF REPAIR Right    • FEMUR ORIF Left     ORIF, Femur   • INGUINAL HERNIA REPAIR Left    • OTHER CARDIAC SURGERY      reports has had 6 cardioversions       Social History     Socioeconomic History   • Marital status:      Spouse name: Not on file   • Number of children: Not on file   • Years of education: Not on file   • Highest education level: Not on file   Occupational History   • Not on file   Social Needs   • Financial resource strain: Not on file   • Food insecurity     Worry: Not on file     Inability: Not on file   • Transportation needs     Medical: Not on file     Non-medical: Not on file   Tobacco Use   • Smoking status: Former Smoker     Years: 10.00     Types: Cigarettes     Last attempt to quit: 1987     Years since quittin.4   • Smokeless tobacco: Never Used   Substance and Sexual Activity   • Alcohol use: Yes     Alcohol/week: 0.0 oz     Comment: 5 per week   • Drug use: No   • Sexual  "activity: Not on file   Lifestyle   • Physical activity     Days per week: Not on file     Minutes per session: Not on file   • Stress: Not on file   Relationships   • Social connections     Talks on phone: Not on file     Gets together: Not on file     Attends Church service: Not on file     Active member of club or organization: Not on file     Attends meetings of clubs or organizations: Not on file     Relationship status: Not on file   • Intimate partner violence     Fear of current or ex partner: Not on file     Emotionally abused: Not on file     Physically abused: Not on file     Forced sexual activity: Not on file   Other Topics Concern   • Not on file   Social History Narrative   • Not on file       Family History   Problem Relation Age of Onset   • Cancer Mother    • Cancer Father    • Cancer Brother        Allergies   Allergen Reactions   • Cat Hair Extract Shortness of Breath     \"wheezing\"       Current Outpatient Medications   Medication Sig Dispense Refill   • flecainide (TAMBOCOR) 50 MG tablet Take 150-200mg as needed, daily. 120 Tab 3   • metoprolol (LOPRESSOR) 25 MG Tab Take 1 Tab by mouth 2 times a day. (Patient taking differently: Take 25 mg by mouth every day.) 180 Tab 3   • carbidopa-levodopa (SINEMET)  MG Tab Take 1 Tab by mouth 3 times a day. Indications: 500Mg daily       No current facility-administered medications for this visit.        Physical Exam:  Vitals:    05/27/20 1456   BP: 160/96   BP Location: Left arm   Patient Position: Sitting   BP Cuff Size: Adult   Pulse: 80   SpO2: 95%   Weight: 91.6 kg (202 lb)   Height: 1.778 m (5' 10\")     General appearance: NAD, conversant   Eyes: anicteric sclerae, moist conjunctivae; no lid-lag; PERRLA  HENT: Atraumatic; oropharynx clear with moist mucous membranes and no mucosal ulcerations; normal hard and soft palate  Neck: Trachea midline; FROM, supple, no thyromegaly or lymphadenopathy  Lungs: CTA, with normal respiratory effort and no " intercostal retractions  CV: RRR, no MRGs, no JVD   Abdomen: Soft, non-tender; no masses or HSM  Extremities: No peripheral edema or extremity lymphadenopathy  Skin: Normal temperature, turgor and texture; no rash, ulcers or subcutaneous nodules  Psych: Appropriate affect, alert and oriented to person, place and time    Data:  Lipids:   Lab Results   Component Value Date/Time    CHOLSTRLTOT 181 04/13/2012 10:34 AM    TRIGLYCERIDE 94 04/13/2012 10:34 AM    HDL 56 04/13/2012 10:34 AM     (H) 04/13/2012 10:34 AM        BMP:  Lab Results   Component Value Date/Time    SODIUM 142 05/14/2020 2037    POTASSIUM 4.3 05/14/2020 2037    CHLORIDE 103 05/14/2020 2037    CO2 25 05/14/2020 2037    GLUCOSE 119 (H) 05/14/2020 2037    BUN 13 05/14/2020 2037    CREATININE 0.89 05/14/2020 2037    CALCIUM 9.9 05/14/2020 2037    ANION 14.0 05/14/2020 2037        TSH:   Lab Results   Component Value Date/Time    TSHULTRASEN 3.750 12/16/2019 2255        THYROXINE (T4):   No results found for: KITTYIR     CBC:   Lab Results   Component Value Date/Time    WBC 8.7 05/14/2020 08:37 PM    RBC 5.50 05/14/2020 08:37 PM    HEMOGLOBIN 15.8 05/14/2020 08:37 PM    HEMATOCRIT 48.3 05/14/2020 08:37 PM    MCV 87.8 05/14/2020 08:37 PM    MCH 28.7 05/14/2020 08:37 PM    MCHC 32.7 (L) 05/14/2020 08:37 PM    RDW 42.1 05/14/2020 08:37 PM    PLATELETCT 196 05/14/2020 08:37 PM    MPV 11.0 05/14/2020 08:37 PM    NEUTSPOLYS 69.60 05/14/2020 08:37 PM    LYMPHOCYTES 22.20 05/14/2020 08:37 PM    MONOCYTES 6.50 05/14/2020 08:37 PM    EOSINOPHILS 1.20 05/14/2020 08:37 PM    BASOPHILS 0.30 05/14/2020 08:37 PM    IMMGRAN 0.20 05/14/2020 08:37 PM    NRBC 0.00 05/14/2020 08:37 PM    NEUTS 6.02 05/14/2020 08:37 PM    LYMPHS 1.92 05/14/2020 08:37 PM    MONOS 0.56 05/14/2020 08:37 PM    EOS 0.10 05/14/2020 08:37 PM    BASO 0.03 05/14/2020 08:37 PM    IMMGRANAB 0.02 05/14/2020 08:37 PM    NRBCAB 0.00 05/14/2020 08:37 PM        CBC w/o DIFF  Lab Results   Component  Value Date/Time    WBC 8.7 05/14/2020 08:37 PM    RBC 5.50 05/14/2020 08:37 PM    HEMOGLOBIN 15.8 05/14/2020 08:37 PM    MCV 87.8 05/14/2020 08:37 PM    MCH 28.7 05/14/2020 08:37 PM    MCHC 32.7 (L) 05/14/2020 08:37 PM    RDW 42.1 05/14/2020 08:37 PM    MPV 11.0 05/14/2020 08:37 PM       Prior echo/stress results reviewed: Normal ejection fraction    EKG interpreted by me: Normal sinus rhythm    Impression/Plan:  1. PAF (paroxysmal atrial fibrillation) ([facility])  EKG    EKG   2. Encounter for monitoring sotalol therapy  EKG     1.  Paroxysmal atrial fibrillation with RVR.  On metoprolol, and pill in pocket Tambocor.  We discussed in great detail options for rhythm control including continued use of antiarrhythmic medications, versus pulmonary vein isolation. We discussed pulmonary vein isolation for therapeutic management and continued rhythm control.  We discussed the risks and benefits of this procedure.  Risks include 1-3% risk of major cardiovascular event including stroke, myocardial infarction, phrenic nerve damage, esophageal injury and/or fistula formation, cardiac perforation, pericardial effusion, tamponade, major bleeding, or death.  I quoted a 70 to 80% chance free of atrial fibrillation at 12 months.  We discussed that he may also need a second procedure.  Additionally we discussed anticoagulation, which I strongly encouraged at this time.    I will prescribe Xarelto 20 mg for anticoagulation.  He notes that if he has recurrence of atrial fibrillation with RVR requiring cardioversion, he would like to proceed with an ablation procedure.  Otherwise, we will follow-up again in 6 months.    Return to clinic in 6 months        Orlando Pickett MD  Cardiac Electrophysiology

## 2020-09-22 ENCOUNTER — APPOINTMENT (RX ONLY)
Dept: URBAN - METROPOLITAN AREA CLINIC 20 | Facility: CLINIC | Age: 80
Setting detail: DERMATOLOGY
End: 2020-09-22

## 2020-09-22 DIAGNOSIS — Z85.828 PERSONAL HISTORY OF OTHER MALIGNANT NEOPLASM OF SKIN: ICD-10-CM

## 2020-09-22 DIAGNOSIS — Z71.89 OTHER SPECIFIED COUNSELING: ICD-10-CM

## 2020-09-22 DIAGNOSIS — L81.4 OTHER MELANIN HYPERPIGMENTATION: ICD-10-CM

## 2020-09-22 DIAGNOSIS — D18.0 HEMANGIOMA: ICD-10-CM

## 2020-09-22 DIAGNOSIS — L57.0 ACTINIC KERATOSIS: ICD-10-CM

## 2020-09-22 DIAGNOSIS — D22 MELANOCYTIC NEVI: ICD-10-CM

## 2020-09-22 DIAGNOSIS — L82.1 OTHER SEBORRHEIC KERATOSIS: ICD-10-CM

## 2020-09-22 PROBLEM — D22.72 MELANOCYTIC NEVI OF LEFT LOWER LIMB, INCLUDING HIP: Status: ACTIVE | Noted: 2020-09-22

## 2020-09-22 PROBLEM — D22.39 MELANOCYTIC NEVI OF OTHER PARTS OF FACE: Status: ACTIVE | Noted: 2020-09-22

## 2020-09-22 PROBLEM — D18.01 HEMANGIOMA OF SKIN AND SUBCUTANEOUS TISSUE: Status: ACTIVE | Noted: 2020-09-22

## 2020-09-22 PROBLEM — D22.71 MELANOCYTIC NEVI OF RIGHT LOWER LIMB, INCLUDING HIP: Status: ACTIVE | Noted: 2020-09-22

## 2020-09-22 PROBLEM — D22.61 MELANOCYTIC NEVI OF RIGHT UPPER LIMB, INCLUDING SHOULDER: Status: ACTIVE | Noted: 2020-09-22

## 2020-09-22 PROBLEM — D22.5 MELANOCYTIC NEVI OF TRUNK: Status: ACTIVE | Noted: 2020-09-22

## 2020-09-22 PROBLEM — D22.62 MELANOCYTIC NEVI OF LEFT UPPER LIMB, INCLUDING SHOULDER: Status: ACTIVE | Noted: 2020-09-22

## 2020-09-22 PROCEDURE — ? LIQUID NITROGEN

## 2020-09-22 PROCEDURE — 99214 OFFICE O/P EST MOD 30 MIN: CPT | Mod: 25

## 2020-09-22 PROCEDURE — ? COUNSELING

## 2020-09-22 PROCEDURE — ? ADDITIONAL NOTES

## 2020-09-22 PROCEDURE — 17000 DESTRUCT PREMALG LESION: CPT

## 2020-09-22 PROCEDURE — 17003 DESTRUCT PREMALG LES 2-14: CPT

## 2020-09-22 PROCEDURE — ? SUNSCREEN RECOMMENDATIONS

## 2020-09-22 ASSESSMENT — LOCATION DETAILED DESCRIPTION DERM
LOCATION DETAILED: RIGHT RADIAL DORSAL HAND
LOCATION DETAILED: LEFT PROXIMAL POSTERIOR UPPER ARM
LOCATION DETAILED: INFERIOR THORACIC SPINE
LOCATION DETAILED: RIGHT SUPERIOR HELIX
LOCATION DETAILED: LEFT VENTRAL PROXIMAL FOREARM
LOCATION DETAILED: RIGHT PROXIMAL PRETIBIAL REGION
LOCATION DETAILED: LEFT MEDIAL FRONTAL SCALP
LOCATION DETAILED: LEFT LATERAL PROXIMAL PRETIBIAL REGION
LOCATION DETAILED: LEFT SUPERIOR CENTRAL MALAR CHEEK
LOCATION DETAILED: RIGHT INFERIOR FOREHEAD
LOCATION DETAILED: RIGHT VENTRAL PROXIMAL FOREARM
LOCATION DETAILED: RIGHT INFERIOR MEDIAL UPPER BACK
LOCATION DETAILED: RIGHT CENTRAL FRONTAL SCALP
LOCATION DETAILED: RIGHT SUPERIOR UPPER BACK
LOCATION DETAILED: RIGHT SCAPHA
LOCATION DETAILED: RIGHT CENTRAL PARIETAL SCALP
LOCATION DETAILED: RIGHT SUPERIOR ANTERIOR NECK
LOCATION DETAILED: RIGHT SUPERIOR OCCIPITAL SCALP
LOCATION DETAILED: RIGHT DISTAL POSTERIOR THIGH
LOCATION DETAILED: RIGHT DISTAL POSTERIOR UPPER ARM
LOCATION DETAILED: RIGHT INFERIOR CENTRAL MALAR CHEEK
LOCATION DETAILED: LEFT DISTAL POSTERIOR THIGH
LOCATION DETAILED: RIGHT INFERIOR MEDIAL MIDBACK

## 2020-09-22 ASSESSMENT — LOCATION SIMPLE DESCRIPTION DERM
LOCATION SIMPLE: UPPER BACK
LOCATION SIMPLE: SCALP
LOCATION SIMPLE: LEFT POSTERIOR UPPER ARM
LOCATION SIMPLE: LEFT POSTERIOR THIGH
LOCATION SIMPLE: RIGHT ANTERIOR NECK
LOCATION SIMPLE: RIGHT FOREHEAD
LOCATION SIMPLE: RIGHT FOREARM
LOCATION SIMPLE: RIGHT POSTERIOR THIGH
LOCATION SIMPLE: RIGHT EAR
LOCATION SIMPLE: LEFT FOREARM
LOCATION SIMPLE: RIGHT HAND
LOCATION SIMPLE: RIGHT UPPER BACK
LOCATION SIMPLE: RIGHT POSTERIOR UPPER ARM
LOCATION SIMPLE: LEFT CHEEK
LOCATION SIMPLE: RIGHT OCCIPITAL SCALP
LOCATION SIMPLE: RIGHT PRETIBIAL REGION
LOCATION SIMPLE: LEFT SCALP
LOCATION SIMPLE: RIGHT LOWER BACK
LOCATION SIMPLE: RIGHT SCALP
LOCATION SIMPLE: LEFT PRETIBIAL REGION
LOCATION SIMPLE: RIGHT CHEEK

## 2020-09-22 ASSESSMENT — LOCATION ZONE DERM
LOCATION ZONE: TRUNK
LOCATION ZONE: ARM
LOCATION ZONE: NECK
LOCATION ZONE: HAND
LOCATION ZONE: EAR
LOCATION ZONE: SCALP
LOCATION ZONE: FACE
LOCATION ZONE: LEG

## 2020-09-22 NOTE — PROCEDURE: ADDITIONAL NOTES
Detail Level: Detailed
Additional Notes: If AK near the left eye does not resolve with LN2 patient has direct line to call for biopsy

## 2020-09-22 NOTE — PROCEDURE: LIQUID NITROGEN
Duration Of Freeze Thaw-Cycle (Seconds): 10
Consent: The patient's consent was obtained including but not limited to risks of crusting, scabbing, blistering, scarring, darker or lighter pigmentary change, recurrence, incomplete removal and infection. RTC in 2 months if lesion(s) persistent.
Render Post-Care Instructions In Note?: yes
Render Note In Bullet Format When Appropriate: No
Post-Care Instructions: I reviewed with the patient in detail post-care instructions. Patient is to wear sunprotection, and avoid picking at any of the treated lesions. Pt may apply Vaseline to crusted or scabbing areas.
Number Of Freeze-Thaw Cycles: 2 freeze-thaw cycles
Detail Level: Detailed

## 2020-12-01 ENCOUNTER — OFFICE VISIT (OUTPATIENT)
Dept: CARDIOLOGY | Facility: MEDICAL CENTER | Age: 80
End: 2020-12-01
Payer: MEDICARE

## 2020-12-01 VITALS
HEIGHT: 70 IN | OXYGEN SATURATION: 96 % | HEART RATE: 76 BPM | WEIGHT: 184 LBS | SYSTOLIC BLOOD PRESSURE: 152 MMHG | BODY MASS INDEX: 26.34 KG/M2 | DIASTOLIC BLOOD PRESSURE: 80 MMHG

## 2020-12-01 DIAGNOSIS — I47.10 SVT (SUPRAVENTRICULAR TACHYCARDIA) (HCC): ICD-10-CM

## 2020-12-01 PROCEDURE — 93000 ELECTROCARDIOGRAM COMPLETE: CPT | Performed by: INTERNAL MEDICINE

## 2020-12-01 PROCEDURE — 99214 OFFICE O/P EST MOD 30 MIN: CPT | Performed by: INTERNAL MEDICINE

## 2020-12-01 ASSESSMENT — FIBROSIS 4 INDEX: FIB4 SCORE: 2.95

## 2020-12-02 LAB — EKG IMPRESSION: NORMAL

## 2020-12-02 NOTE — PROGRESS NOTES
Arrhythmia Clinic Note (Established patient)    DOS: 12/1/2020    Chief complaint/Reason for consult: Afib    Interval History: Prior history of paroxysmal atrial fibrillation, multiple cardioversions in the past, last one in 2017, saw him 6 months ago, he was on Xarelto, metoprolol, and flecainide as needed.  Since I last saw him, he stopped taking all of these medications.  He lost 20 pounds of weight.  He has not had any more palpitations.  He says he does not see the need to take these medications anymore.    ROS (+ highlighted in bold):  Constitutional: Fevers/chills/fatigue/weightloss  HEENT: Blurry vision/eye pain/sore throat/hearing loss  Respiratory: Shortness of breath/cough  Cardiovascular: Chest pain/palpitations/edema/orthopnea/syncope  GI: Nausea/vomitting/diarrhea  MSK: Arthralgias/myagias/muscle weakness  Skin: Rash/sores  Neurological: Numbness/tremors/vertigo  Endocrine: Excessive thirst/polyuria/cold intolerance/heat intolerance  Psych: Depression/anxiety    Past Medical History:   Diagnosis Date   • Arthritis     bilateral knees   • Bronchitis    • Cancer ([facility]) 2003    throat Squamous Cell, with chemo/radiation, lymph nodes removal   • PAF (paroxysmal atrial fibrillation) (Former[facility])     denies taking any meds, cardioversion last one was 2017, HX of SVT   • Parkinson disease ([facility])     declines to take meds   • Pneumonia    • SVT (supraventricular tachycardia) ([facility])     denies taking any meds,cardioversion       Past Surgical History:   Procedure Laterality Date   • BLEPHAROPLASTY Bilateral 7/29/2019    Procedure: BLEPHAROPLASTY- UPPER;  Surgeon: Jos Baron M.D.;  Location: [facility];  Service: Plastics   • BROW LIFT Right 7/29/2019    Procedure: RHYTIDECTOMY, FOREHEAD- DIRECT BROW LIFT;  Surgeon: Jos Baron M.D.;  Location: [facility];  Service: Plastics   • INGUINAL HERNIA REPAIR Bilateral 3/31/2017    Procedure: INGUINAL HERNIA REPAIR W/MESH;   Surgeon: Michael Malin M.D.;  Location: SURGERY SAME DAY [facility];  Service:    • UMBILICAL HERNIA REPAIR N/A 3/31/2017    Procedure: UMBILICAL HERNIA REPAIR W/MESH;  Surgeon: Michael Malin M.D.;  Location: SURGERY SAME DAY [facility];  Service:    • NECK DISSECTION     • HIP ARTHROPLASTY TOTAL Right     Hip Replacement, Total   • SHOULDER ARTHROSCOPY W/ ROTATOR CUFF REPAIR Right    • FEMUR ORIF Left     ORIF, Femur   • INGUINAL HERNIA REPAIR Left    • OTHER CARDIAC SURGERY      reports has had 6 cardioversions       Social History     Socioeconomic History   • Marital status:      Spouse name: Not on file   • Number of children: Not on file   • Years of education: Not on file   • Highest education level: Not on file   Occupational History   • Not on file   Social Needs   • Financial resource strain: Not on file   • Food insecurity     Worry: Not on file     Inability: Not on file   • Transportation needs     Medical: Not on file     Non-medical: Not on file   Tobacco Use   • Smoking status: Former Smoker     Years: 10.00     Types: Cigarettes     Quit date: 1987     Years since quittin.9   • Smokeless tobacco: Never Used   Substance and Sexual Activity   • Alcohol use: Yes     Alcohol/week: 0.0 oz     Comment: 5 per week   • Drug use: No   • Sexual activity: Not on file   Lifestyle   • Physical activity     Days per week: Not on file     Minutes per session: Not on file   • Stress: Not on file   Relationships   • Social connections     Talks on phone: Not on file     Gets together: Not on file     Attends Restoration[employer] service: Not on file     Active member of club or organization: Not on file     Attends meetings of clubs or organizations: Not on file     Relationship status: Not on file   • Intimate partner violence     Fear of current or ex partner: Not on file     Emotionally abused: Not on file     Physically abused: Not on file     Forced sexual activity: Not on file  "  Other Topics Concern   • Not on file   Social History Narrative   • Not on file       Family History   Problem Relation Age of Onset   • Cancer Mother    • Cancer Father    • Cancer Brother        Allergies   Allergen Reactions   • Cat Hair Extract Shortness of Breath     \"wheezing\"       Current Outpatient Medications   Medication Sig Dispense Refill   • carbidopa-levodopa (SINEMET)  MG Tab Take 1 Tab by mouth 3 times a day. Indications: 500Mg daily     • rivaroxaban (XARELTO) 20 MG Tab tablet Take 1 Tab by mouth with dinner. (Patient not taking: Reported on 12/1/2020) 30 Tab 11   • flecainide (TAMBOCOR) 50 MG tablet Take 150-200mg as needed, daily. (Patient not taking: Reported on 12/1/2020) 120 Tab 3   • metoprolol (LOPRESSOR) 25 MG Tab Take 1 Tab by mouth 2 times a day. (Patient not taking: Reported on 12/1/2020) 180 Tab 3     No current facility-administered medications for this visit.        Physical Exam:  Vitals:    12/01/20 1547   BP: 152/80   BP Location: Left arm   Patient Position: Sitting   BP Cuff Size: Adult   Pulse: 76   SpO2: 96%   Weight: 83.5 kg (184 lb)   Height: 1.778 m (5' 10\")     General appearance: NAD, conversant   Eyes: anicteric sclerae, moist conjunctivae; no lid-lag; PERRLA  HENT: Atraumatic; oropharynx clear with moist mucous membranes and no mucosal ulcerations; normal hard and soft palate  Neck: Trachea midline; FROM, supple, no thyromegaly or lymphadenopathy  Lungs: CTA, with normal respiratory effort and no intercostal retractions  CV: RRR, no MRGs, no JVD  Abdomen: Soft, non-tender; no masses or HSM  Extremities: No peripheral edema or extremity lymphadenopathy  Skin: Normal temperature, turgor and texture; no rash, ulcers or subcutaneous nodules  Psych: Appropriate affect, alert and oriented to person, place and time    Data:  Lipids:   Lab Results   Component Value Date/Time    CHOLSTRLTOT 181 04/13/2012 10:34 AM    TRIGLYCERIDE 94 04/13/2012 10:34 AM    HDL 56 " 04/13/2012 10:34 AM     (H) 04/13/2012 10:34 AM        BMP:  Lab Results   Component Value Date/Time    SODIUM 142 05/14/2020 2037    POTASSIUM 4.3 05/14/2020 2037    CHLORIDE 103 05/14/2020 2037    CO2 25 05/14/2020 2037    GLUCOSE 119 (H) 05/14/2020 2037    BUN 13 05/14/2020 2037    CREATININE 0.89 05/14/2020 2037    CALCIUM 9.9 05/14/2020 2037    ANION 14.0 05/14/2020 2037        TSH:   Lab Results   Component Value Date/Time    TSHULTRASEN 3.750 12/16/2019 2255        THYROXINE (T4):   No results found for: FREEDIR     CBC:   Lab Results   Component Value Date/Time    WBC 8.7 05/14/2020 08:37 PM    RBC 5.50 05/14/2020 08:37 PM    HEMOGLOBIN 15.8 05/14/2020 08:37 PM    HEMATOCRIT 48.3 05/14/2020 08:37 PM    MCV 87.8 05/14/2020 08:37 PM    MCH 28.7 05/14/2020 08:37 PM    MCHC 32.7 (L) 05/14/2020 08:37 PM    RDW 42.1 05/14/2020 08:37 PM    PLATELETCT 196 05/14/2020 08:37 PM    MPV 11.0 05/14/2020 08:37 PM    NEUTSPOLYS 69.60 05/14/2020 08:37 PM    LYMPHOCYTES 22.20 05/14/2020 08:37 PM    MONOCYTES 6.50 05/14/2020 08:37 PM    EOSINOPHILS 1.20 05/14/2020 08:37 PM    BASOPHILS 0.30 05/14/2020 08:37 PM    IMMGRAN 0.20 05/14/2020 08:37 PM    NRBC 0.00 05/14/2020 08:37 PM    NEUTS 6.02 05/14/2020 08:37 PM    LYMPHS 1.92 05/14/2020 08:37 PM    MONOS 0.56 05/14/2020 08:37 PM    EOS 0.10 05/14/2020 08:37 PM    BASO 0.03 05/14/2020 08:37 PM    IMMGRANAB 0.02 05/14/2020 08:37 PM    NRBCAB 0.00 05/14/2020 08:37 PM        CBC w/o DIFF  Lab Results   Component Value Date/Time    WBC 8.7 05/14/2020 08:37 PM    RBC 5.50 05/14/2020 08:37 PM    HEMOGLOBIN 15.8 05/14/2020 08:37 PM    MCV 87.8 05/14/2020 08:37 PM    MCH 28.7 05/14/2020 08:37 PM    MCHC 32.7 (L) 05/14/2020 08:37 PM    RDW 42.1 05/14/2020 08:37 PM    MPV 11.0 05/14/2020 08:37 PM       Prior echo/stress reviewed: Ejection fraction 70%    EKG interpreted by me: Sinus rhythm, PAC    Impression/Plan:  1.  Paroxysmal atrial fibrillation    -Minimal burden now.  He has  lost 20 pounds.  Feeling very well.  He wishes to avoid taking medications now including anticoagulation if possible.  I did tell him that his stroke risk factor was still 2 despite low burden of AF.  For this reason I suggested either resuming oral anticoagulation or implanting loop recorder to screen for atrial fibrillation recurrence.  At this time he does not want to do either of these, but will reassess in 6 months, and certainly will be more inclined to resume anticoagulation if he has any evidence of palpitations.    Follow-up in 6 months    Orlando Pickett MD  Cardiac Electrophysiology

## 2021-01-14 DIAGNOSIS — Z23 NEED FOR VACCINATION: ICD-10-CM

## 2021-04-21 ENCOUNTER — HOSPITAL ENCOUNTER (OUTPATIENT)
Dept: LAB | Facility: MEDICAL CENTER | Age: 81
End: 2021-04-21
Attending: ANESTHESIOLOGY
Payer: MEDICARE

## 2021-04-21 LAB
COVID ORDER STATUS COVID19: NORMAL
SARS-COV-2 RNA RESP QL NAA+PROBE: NOTDETECTED
SPECIMEN SOURCE: NORMAL

## 2021-04-21 PROCEDURE — U0005 INFEC AGEN DETEC AMPLI PROBE: HCPCS

## 2021-04-21 PROCEDURE — C9803 HOPD COVID-19 SPEC COLLECT: HCPCS

## 2021-04-21 PROCEDURE — U0003 INFECTIOUS AGENT DETECTION BY NUCLEIC ACID (DNA OR RNA); SEVERE ACUTE RESPIRATORY SYNDROME CORONAVIRUS 2 (SARS-COV-2) (CORONAVIRUS DISEASE [COVID-19]), AMPLIFIED PROBE TECHNIQUE, MAKING USE OF HIGH THROUGHPUT TECHNOLOGIES AS DESCRIBED BY CMS-2020-01-R: HCPCS

## 2021-06-03 ENCOUNTER — OFFICE VISIT (OUTPATIENT)
Dept: CARDIOLOGY | Facility: MEDICAL CENTER | Age: 81
End: 2021-06-03
Payer: MEDICARE

## 2021-06-03 VITALS
HEIGHT: 70 IN | RESPIRATION RATE: 14 BRPM | OXYGEN SATURATION: 97 % | BODY MASS INDEX: 26.56 KG/M2 | DIASTOLIC BLOOD PRESSURE: 80 MMHG | HEART RATE: 86 BPM | WEIGHT: 185.5 LBS | SYSTOLIC BLOOD PRESSURE: 142 MMHG

## 2021-06-03 DIAGNOSIS — I48.0 PAF (PAROXYSMAL ATRIAL FIBRILLATION) (HCC): ICD-10-CM

## 2021-06-03 DIAGNOSIS — I47.10 SVT (SUPRAVENTRICULAR TACHYCARDIA) (HCC): ICD-10-CM

## 2021-06-03 LAB — EKG IMPRESSION: NORMAL

## 2021-06-03 PROCEDURE — 99213 OFFICE O/P EST LOW 20 MIN: CPT | Performed by: INTERNAL MEDICINE

## 2021-06-03 PROCEDURE — 93000 ELECTROCARDIOGRAM COMPLETE: CPT | Performed by: INTERNAL MEDICINE

## 2021-06-03 ASSESSMENT — FIBROSIS 4 INDEX: FIB4 SCORE: 2.95

## 2021-06-03 NOTE — PROGRESS NOTES
Arrhythmia Clinic Note (Established patient)    DOS: 6/3/2021    Chief complaint/Reason for consult: Afib    Interval History: It has been 1 year since he had any atrial fibrillation.  He is off flecainide metoprolol and Xarelto.  Feeling great and no interest in resuming any medications if he does not have Afib recurrence.    ROS (+ highlighted in bold):  Constitutional: Fevers/chills/fatigue/weightloss  HEENT: Blurry vision/eye pain/sore throat/hearing loss  Respiratory: Shortness of breath/cough  Cardiovascular: Chest pain/palpitations/edema/orthopnea/syncope  GI: Nausea/vomitting/diarrhea  MSK: Arthralgias/myagias/muscle weakness  Skin: Rash/sores  Neurological: Numbness/tremors/vertigo  Endocrine: Excessive thirst/polyuria/cold intolerance/heat intolerance  Psych: Depression/anxiety    Past Medical History:   Diagnosis Date   • Arthritis     bilateral knees   • Bronchitis    • Cancer ([facility]) 2003    throat Squamous Cell, with chemo/radiation, lymph nodes removal   • PAF (paroxysmal atrial fibrillation) ([facility])     denies taking any meds, cardioversion last one was 2017, HX of SVT   • Parkinson disease ([facility])     declines to take meds   • Pneumonia    • SVT (supraventricular tachycardia) ([facility])     denies taking any meds,cardioversion       Past Surgical History:   Procedure Laterality Date   • BLEPHAROPLASTY Bilateral 7/29/2019    Procedure: BLEPHAROPLASTY- UPPER;  Surgeon: Jos Baron M.D.;  [facility]: [facility];  Service: Plastics   • BROW LIFT Right 7/29/2019    Procedure: RHYTIDECTOMY, FOREHEAD- DIRECT BROW LIFT;  Surgeon: Jos Baron M.D.;  Location: [facility];  Service: Plastics   • INGUINAL HERNIA REPAIR Bilateral 3/31/2017    Procedure: INGUINAL HERNIA REPAIR W/MESH;  Surgeon: Michael Malin M.D.;  Location: SURGERY SAME DAY [facility];  Service:    • UMBILICAL HERNIA REPAIR N/A 3/31/2017    Procedure: UMBILICAL HERNIA REPAIR W/MESH;  Surgeon: Michael GUTIERREZ  RACHEL Malin;  Location: SURGERY SAME DAY [facility];  Service:    • NECK DISSECTION     • HIP ARTHROPLASTY TOTAL Right     Hip Replacement, Total   • SHOULDER ARTHROSCOPY W/ ROTATOR CUFF REPAIR Right    • FEMUR ORIF Left     ORIF, Femur   • INGUINAL HERNIA REPAIR Left    • OTHER CARDIAC SURGERY      reports has had 6 cardioversions       Social History     Socioeconomic History   • Marital status:      Spouse name: Not on file   • Number of children: Not on file   • Years of education: Not on file   • Highest education level: Not on file   Occupational History   • Not on file   Tobacco Use   • Smoking status: Former Smoker     Years: 10.00     Types: Cigarettes     Quit date: 1987     Years since quittin.4   • Smokeless tobacco: Never Used   Vaping Use   • Vaping Use: Never used   Substance and Sexual Activity   • Alcohol use: Yes     Alcohol/week: 0.0 oz     Comment: 5 per week   • Drug use: No   • Sexual activity: Not on file   Other Topics Concern   • Not on file   Social History Narrative   • Not on file     Social Determinants of Health     Financial Resource Strain:    • Difficulty of Paying Living Expenses:    Food Insecurity:    • Worried About Running Out of Food in the Last Year:    • Ran Out of Food in the Last Year:    Transportation Needs:    • Lack of Transportation (Medical):    • Lack of Transportation (Non-Medical):    Physical Activity:    • Days of Exercise per Week:    • Minutes of Exercise per Session:    Stress:    • Feeling of Stress :    Social Connections:    • Frequency of Communication with Friends and Family:    • Frequency of Social Gatherings with Friends and Family:    • Attends [employer] Services:    • Active Member of Clubs or Organizations:    • Attends Club or Organization Meetings:    • Marital Status:    Intimate Partner Violence:    • Fear of Current or Ex-Partner:    • Emotionally Abused:    • Physically Abused:    • Sexually Abused:   "      Family History   Problem Relation Age of Onset   • Cancer Mother    • Cancer Father    • Cancer Brother        Allergies   Allergen Reactions   • Cat Hair Extract Shortness of Breath     \"wheezing\"       Current Outpatient Medications   Medication Sig Dispense Refill   • carbidopa-levodopa (SINEMET)  MG Tab Take 1 Tab by mouth 3 times a day. Indications: 500Mg daily       No current facility-administered medications for this visit.       Physical Exam:  Vitals:    06/03/21 1557   BP: 142/80   BP Location: Left arm   Patient Position: Sitting   BP Cuff Size: Adult   Pulse: 86   Resp: 14   SpO2: 97%   Weight: 84.1 kg (185 lb 8 oz)   Height: 1.778 m (5' 10\")     General appearance: NAD, conversant   Eyes: anicteric sclerae, moist conjunctivae; no lid-lag; PERRLA  HENT: Atraumatic; oropharynx clear with moist mucous membranes and no mucosal ulcerations; normal hard and soft palate  Neck: Trachea midline; FROM, supple, no thyromegaly or lymphadenopathy  Lungs: CTA, with normal respiratory effort and no intercostal retractions  CV: RRR, no MRGs, no JVD  Abdomen: Soft, non-tender; no masses or HSM  Extremities: No peripheral edema or extremity lymphadenopathy  Skin: Normal temperature, turgor and texture; no rash, ulcers or subcutaneous nodules  Psych: Appropriate affect, alert and oriented to person, place and time    Data:  Lipids:   Lab Results   Component Value Date/Time    CHOLSTRLTOT 181 04/13/2012 10:34 AM    TRIGLYCERIDE 94 04/13/2012 10:34 AM    HDL 56 04/13/2012 10:34 AM     (H) 04/13/2012 10:34 AM        BMP:  Lab Results   Component Value Date/Time    SODIUM 142 05/14/2020 2037    POTASSIUM 4.3 05/14/2020 2037    CHLORIDE 103 05/14/2020 2037    CO2 25 05/14/2020 2037    GLUCOSE 119 (H) 05/14/2020 2037    BUN 13 05/14/2020 2037    CREATININE 0.89 05/14/2020 2037    CALCIUM 9.9 05/14/2020 2037    ANION 14.0 05/14/2020 2037        TSH:   Lab Results   Component Value Date/Time    TSHULTRASEN " 3.750 12/16/2019 2255        THYROXINE (T4):   No results found for: KITTYIR     CBC:   Lab Results   Component Value Date/Time    WBC 8.7 05/14/2020 08:37 PM    RBC 5.50 05/14/2020 08:37 PM    HEMOGLOBIN 15.8 05/14/2020 08:37 PM    HEMATOCRIT 48.3 05/14/2020 08:37 PM    MCV 87.8 05/14/2020 08:37 PM    MCH 28.7 05/14/2020 08:37 PM    MCHC 32.7 (L) 05/14/2020 08:37 PM    RDW 42.1 05/14/2020 08:37 PM    PLATELETCT 196 05/14/2020 08:37 PM    MPV 11.0 05/14/2020 08:37 PM    NEUTSPOLYS 69.60 05/14/2020 08:37 PM    LYMPHOCYTES 22.20 05/14/2020 08:37 PM    MONOCYTES 6.50 05/14/2020 08:37 PM    EOSINOPHILS 1.20 05/14/2020 08:37 PM    BASOPHILS 0.30 05/14/2020 08:37 PM    IMMGRAN 0.20 05/14/2020 08:37 PM    NRBC 0.00 05/14/2020 08:37 PM    NEUTS 6.02 05/14/2020 08:37 PM    LYMPHS 1.92 05/14/2020 08:37 PM    MONOS 0.56 05/14/2020 08:37 PM    EOS 0.10 05/14/2020 08:37 PM    BASO 0.03 05/14/2020 08:37 PM    IMMGRANAB 0.02 05/14/2020 08:37 PM    NRBCAB 0.00 05/14/2020 08:37 PM        CBC w/o DIFF  Lab Results   Component Value Date/Time    WBC 8.7 05/14/2020 08:37 PM    RBC 5.50 05/14/2020 08:37 PM    HEMOGLOBIN 15.8 05/14/2020 08:37 PM    MCV 87.8 05/14/2020 08:37 PM    MCH 28.7 05/14/2020 08:37 PM    MCHC 32.7 (L) 05/14/2020 08:37 PM    RDW 42.1 05/14/2020 08:37 PM    MPV 11.0 05/14/2020 08:37 PM         EKG interpreted by me: Sinus rhythm    Impression/Plan:  1. PAF (paroxysmal atrial fibrillation) ([facility])  EKG   2. SVT (supraventricular tachycardia) ([facility])  EKG     1. pAF    -He is off anticoagulation by choice  -No recurrence of atrial fibrillation over the past year, had a great response to weight loss, off flecainide and metoprolol    Annual follow-up, sooner if A. fib recurrence    Orlando Pickett MD  Cardiac Electrophysiology

## 2021-10-05 NOTE — PROCEDURE: LIQUID NITROGEN
Detail Level: Detailed
Consent: The patient's consent was obtained including but not limited to risks of crusting, scabbing, blistering, scarring, darker or lighter pigmentary change, recurrence, incomplete removal and infection. RTC in 2 months if lesion(s) persistent.
Number Of Freeze-Thaw Cycles: 2 freeze-thaw cycles
Duration Of Freeze Thaw-Cycle (Seconds): 10
Post-Care Instructions: I reviewed with the patient in detail post-care instructions. Patient is to wear sunprotection, and avoid picking at any of the treated lesions. Pt may apply Vaseline to crusted or scabbing areas.
Render Post-Care Instructions In Note?: yes
IV intact

## 2021-11-03 ENCOUNTER — APPOINTMENT (RX ONLY)
Dept: URBAN - METROPOLITAN AREA CLINIC 35 | Facility: CLINIC | Age: 81
Setting detail: DERMATOLOGY
End: 2021-11-03

## 2021-11-03 DIAGNOSIS — D22 MELANOCYTIC NEVI: ICD-10-CM

## 2021-11-03 DIAGNOSIS — L57.0 ACTINIC KERATOSIS: ICD-10-CM

## 2021-11-03 DIAGNOSIS — Z71.89 OTHER SPECIFIED COUNSELING: ICD-10-CM

## 2021-11-03 DIAGNOSIS — L82.1 OTHER SEBORRHEIC KERATOSIS: ICD-10-CM

## 2021-11-03 DIAGNOSIS — L81.4 OTHER MELANIN HYPERPIGMENTATION: ICD-10-CM

## 2021-11-03 DIAGNOSIS — Z85.828 PERSONAL HISTORY OF OTHER MALIGNANT NEOPLASM OF SKIN: ICD-10-CM

## 2021-11-03 DIAGNOSIS — D485 NEOPLASM OF UNCERTAIN BEHAVIOR OF SKIN: ICD-10-CM

## 2021-11-03 PROBLEM — D22.5 MELANOCYTIC NEVI OF TRUNK: Status: ACTIVE | Noted: 2021-11-03

## 2021-11-03 PROBLEM — D48.5 NEOPLASM OF UNCERTAIN BEHAVIOR OF SKIN: Status: ACTIVE | Noted: 2021-11-03

## 2021-11-03 PROCEDURE — 11102 TANGNTL BX SKIN SINGLE LES: CPT

## 2021-11-03 PROCEDURE — ? LIQUID NITROGEN

## 2021-11-03 PROCEDURE — ? COUNSELING

## 2021-11-03 PROCEDURE — ? BIOPSY BY SHAVE METHOD

## 2021-11-03 PROCEDURE — 99213 OFFICE O/P EST LOW 20 MIN: CPT | Mod: 25

## 2021-11-03 PROCEDURE — 17000 DESTRUCT PREMALG LESION: CPT | Mod: 59

## 2021-11-03 PROCEDURE — 17003 DESTRUCT PREMALG LES 2-14: CPT

## 2021-11-03 ASSESSMENT — LOCATION DETAILED DESCRIPTION DERM
LOCATION DETAILED: RIGHT SUPERIOR LATERAL UPPER BACK
LOCATION DETAILED: RIGHT POSTERIOR SHOULDER
LOCATION DETAILED: LEFT DISTAL DORSAL FOREARM
LOCATION DETAILED: LEFT PROXIMAL ULNAR DORSAL RING FINGER
LOCATION DETAILED: LEFT ULNAR DORSAL HAND
LOCATION DETAILED: RIGHT RADIAL DORSAL HAND
LOCATION DETAILED: RIGHT MID-UPPER BACK
LOCATION DETAILED: GLABELLA
LOCATION DETAILED: LEFT PROXIMAL DORSAL FOREARM
LOCATION DETAILED: RIGHT SUPERIOR ANTERIOR NECK
LOCATION DETAILED: LEFT SUPERIOR MEDIAL UPPER BACK
LOCATION DETAILED: RIGHT SUPERIOR UPPER BACK
LOCATION DETAILED: LEFT DISTAL RADIAL DORSAL FOREARM
LOCATION DETAILED: LEFT SUPERIOR HELIX
LOCATION DETAILED: NASAL DORSUM

## 2021-11-03 ASSESSMENT — LOCATION SIMPLE DESCRIPTION DERM
LOCATION SIMPLE: RIGHT UPPER BACK
LOCATION SIMPLE: LEFT EAR
LOCATION SIMPLE: LEFT HAND
LOCATION SIMPLE: LEFT RING FINGER
LOCATION SIMPLE: RIGHT HAND
LOCATION SIMPLE: RIGHT SHOULDER
LOCATION SIMPLE: RIGHT ANTERIOR NECK
LOCATION SIMPLE: LEFT FOREARM
LOCATION SIMPLE: GLABELLA
LOCATION SIMPLE: LEFT UPPER BACK
LOCATION SIMPLE: NOSE

## 2021-11-03 ASSESSMENT — LOCATION ZONE DERM
LOCATION ZONE: EAR
LOCATION ZONE: FACE
LOCATION ZONE: NECK
LOCATION ZONE: FINGER
LOCATION ZONE: HAND
LOCATION ZONE: NOSE
LOCATION ZONE: ARM
LOCATION ZONE: TRUNK

## 2021-11-03 NOTE — PROCEDURE: LIQUID NITROGEN
Consent: The patient's consent was obtained including but not limited to risks of crusting, scabbing, blistering, scarring, darker or lighter pigmentary change, recurrence, incomplete removal and infection.
Number Of Freeze-Thaw Cycles: 1 freeze-thaw cycle
Detail Level: Detailed
Render Post-Care Instructions In Note?: no
Post-Care Instructions: I reviewed with the patient in detail post-care instructions. Patient is to wear sunprotection, and avoid picking at any of the treated lesions. Pt may apply Vaseline to crusted or scabbing areas.
Show Applicator Variable?: Yes
Duration Of Freeze Thaw-Cycle (Seconds): 10

## 2021-11-22 ENCOUNTER — OFFICE VISIT (OUTPATIENT)
Dept: URGENT CARE | Facility: CLINIC | Age: 81
End: 2021-11-22
Payer: MEDICARE

## 2021-11-22 VITALS
BODY MASS INDEX: 27.2 KG/M2 | DIASTOLIC BLOOD PRESSURE: 64 MMHG | TEMPERATURE: 99.7 F | RESPIRATION RATE: 16 BRPM | HEART RATE: 87 BPM | SYSTOLIC BLOOD PRESSURE: 132 MMHG | WEIGHT: 190 LBS | HEIGHT: 70 IN | OXYGEN SATURATION: 94 %

## 2021-11-22 DIAGNOSIS — R50.9 COUGH WITH FEVER: ICD-10-CM

## 2021-11-22 DIAGNOSIS — U07.1 COVID-19: ICD-10-CM

## 2021-11-22 DIAGNOSIS — R05.9 COUGH WITH FEVER: ICD-10-CM

## 2021-11-22 LAB
EXTERNAL QUALITY CONTROL: NORMAL
FLUAV+FLUBV AG SPEC QL IA: NEGATIVE
INT CON NEG: NEGATIVE
INT CON POS: POSITIVE
SARS-COV+SARS-COV-2 AG RESP QL IA.RAPID: POSITIVE

## 2021-11-22 PROCEDURE — 99204 OFFICE O/P NEW MOD 45 MIN: CPT | Mod: CS | Performed by: PHYSICIAN ASSISTANT

## 2021-11-22 PROCEDURE — 87804 INFLUENZA ASSAY W/OPTIC: CPT | Performed by: PHYSICIAN ASSISTANT

## 2021-11-22 PROCEDURE — 87426 SARSCOV CORONAVIRUS AG IA: CPT | Mod: QW | Performed by: PHYSICIAN ASSISTANT

## 2021-11-22 RX ORDER — CHLORHEXIDINE GLUCONATE ORAL RINSE 1.2 MG/ML
SOLUTION DENTAL
COMMUNITY
Start: 2021-11-03 | End: 2021-11-22

## 2021-11-22 RX ORDER — AMOXICILLIN 500 MG/1
CAPSULE ORAL
COMMUNITY
Start: 2021-11-03 | End: 2021-11-22

## 2021-11-22 ASSESSMENT — ENCOUNTER SYMPTOMS
DIARRHEA: 0
SPUTUM PRODUCTION: 1
MYALGIAS: 1
SINUS PAIN: 0
SHORTNESS OF BREATH: 0
VOMITING: 0
SORE THROAT: 0
HEADACHES: 1
ABDOMINAL PAIN: 0
NAUSEA: 0
CHILLS: 0
FEVER: 1
WHEEZING: 0
COUGH: 1

## 2021-11-22 ASSESSMENT — FIBROSIS 4 INDEX: FIB4 SCORE: 2.99

## 2021-11-23 NOTE — PROGRESS NOTES
Subjective:   Hamlet Del Cid is a 81 y.o. male who presents for Fever (x1 week, 100.5F ), Cough (x1 week, possible bronchitis per pt ), and Fatigue      HPI  81 y.o. male presents to urgent care with new problem to provider of fever, cough, congestion, fatigue onset about 1 week ago.   Patient is NOT vaccinated for COVID-19, denies confirmed exposure to COVID however wife has similar symptoms.   Denies other associated aggravating or alleviating factors.     Review of Systems   Constitutional: Positive for fever and malaise/fatigue. Negative for chills.   HENT: Positive for congestion. Negative for sinus pain and sore throat.    Respiratory: Positive for cough and sputum production. Negative for shortness of breath and wheezing.    Cardiovascular: Negative for chest pain.   Gastrointestinal: Negative for abdominal pain, diarrhea, nausea and vomiting.   Musculoskeletal: Positive for myalgias.   Neurological: Positive for headaches.       Patient Active Problem List   Diagnosis   • PAF (paroxysmal atrial fibrillation) (HCC)   • Tremor   • SVT (supraventricular tachycardia) (HCC)   • Carotid artery plaque, bilateral   • Malignant neoplasm of oropharynx (HCC)   • Parkinson's disease (HCC)   • A-fib (HCC)     Past Surgical History:   Procedure Laterality Date   • BLEPHAROPLASTY Bilateral 7/29/2019    Procedure: BLEPHAROPLASTY- UPPER;  Surgeon: Jos Baron M.D.;  Location: [facility];  Service: Plastics   • BROW LIFT Right 7/29/2019    Procedure: RHYTIDECTOMY, FOREHEAD- DIRECT BROW LIFT;  Surgeon: Jos Baron M.D.;  Location: [facility];  Service: Plastics   • INGUINAL HERNIA REPAIR Bilateral 3/31/2017    Procedure: INGUINAL HERNIA REPAIR W/MESH;  Surgeon: Michael Malin M.D.;  Location: SURGERY SAME DAY [facility];  Service:    • UMBILICAL HERNIA REPAIR N/A 3/31/2017    Procedure: UMBILICAL HERNIA REPAIR W/MESH;  Surgeon: Michael Malin M.D.;  Location: SURGERY SAME DAY [facility]  "[facility];  Service:    • NECK DISSECTION     • HIP ARTHROPLASTY TOTAL Right     Hip Replacement, Total   • SHOULDER ARTHROSCOPY W/ ROTATOR CUFF REPAIR Right    • FEMUR ORIF Left     ORIF, Femur   • INGUINAL HERNIA REPAIR Left    • OTHER CARDIAC SURGERY      reports has had 6 cardioversions     Social History     Tobacco Use   • Smoking status: Former Smoker     Years: 10.00     Types: Cigarettes     Quit date: 1987     Years since quittin.9   • Smokeless tobacco: Never Used   Vaping Use   • Vaping Use: Never used   Substance Use Topics   • Alcohol use: Not Currently     Alcohol/week: 0.0 oz     Comment: 5 per week   • Drug use: No      Family History   Problem Relation Age of Onset   • Cancer Mother    • Cancer Father    • Cancer Brother       (Allergies, Medications, & Tobacco/Substance Use were reconciled by the Medical Assistant and reviewed by myself. The family history is prepopulated)     Objective:     /64   Pulse 87   Temp 37.6 °C (99.7 °F) (Oral)   Resp 16   Ht 1.778 m (5' 10\")   Wt 86.2 kg (190 lb)   SpO2 94%   BMI 27.26 kg/m²     Physical Exam  Vitals reviewed.   Constitutional:       General: He is not in acute distress.     Appearance: Normal appearance. He is not ill-appearing or diaphoretic.   HENT:      Head: Normocephalic and atraumatic.      Nose: Nose normal.      Mouth/Throat:      Mouth: Mucous membranes are moist.      Pharynx: Posterior oropharyngeal erythema present. No oropharyngeal exudate.   Eyes:      Conjunctiva/sclera: Conjunctivae normal.   Cardiovascular:      Rate and Rhythm: Normal rate and regular rhythm.      Heart sounds: Normal heart sounds. No murmur heard.  No friction rub. No gallop.    Pulmonary:      Effort: Pulmonary effort is normal. No respiratory distress.      Breath sounds: Normal breath sounds. No wheezing, rhonchi or rales.   Musculoskeletal:         General: Normal range of motion.      Cervical back: Normal range of motion " and neck supple.   Lymphadenopathy:      Cervical: Cervical adenopathy present.   Skin:     General: Skin is warm and dry.      Findings: No rash.   Neurological:      General: No focal deficit present.      Mental Status: He is alert and oriented to person, place, and time.   Psychiatric:         Mood and Affect: Mood normal.         Behavior: Behavior normal.         Thought Content: Thought content normal.         Judgment: Judgment normal.         Assessment/Plan:     1. Cough with fever  POCT SARS-COV Antigen BEATRIS (Symptomatic Only)    POCT Influenza A/B   2. COVID-19       Results for orders placed or performed in visit on 11/22/21   POCT SARS-COV Antigen BEATRIS (Symptomatic Only)   Result Value Ref Range    Internal  Valid     SARS-COV ANTIGEN BEATRIS Positive    POCT Influenza A/B   Result Value Ref Range    Rapid Influenza A-B NEGATIVE     Internal Control Positive Positive     Internal Control Negative Negative      Rapid COVID-19 testing is positive in clinic today.   Patient instructed to self-isolate/quarantine per CDC guidelines. Increased fluids and rest. Discussed use of OTC cough and cold medication and Tylenol/Motrin for symptomatic relief.  Return for reevaluation or proceed to ED if symptoms persist or worsen. Supportive care, differential diagnoses, and indications for immediate follow-up discussed with patient. Patient should to proceed to ED for development of symptoms including but not limited to shortness of breath breath, difficulty breathing, or worsening symptoms not manageable at home.   Vital signs stable, patient in no acute respiratory distress.  COVID-19 discharge instructions and CDC guidelines provided to patient in handout.     Advised the patient to follow-up with the primary care physician for recheck, reevaluation, and consideration of further management.  Patient verbalized understanding of treatment plan and has no further questions regarding care.   This patient is  evaluated under [facility] isolation protocols in urgent care.  Out of an abundance of caution I am wearing a N95 mask, protective eye gear, and gloves through all interaction with patient.    I personally reviewed prior external notes and test results pertinent to today's visit.     Please note that this dictation was created using voice recognition software. I have made a reasonable attempt to correct obvious errors, but I expect that there are errors of grammar and possibly content that I did not discover before finalizing the note.    This note was electronically signed by Katie Saravia PA-C

## 2022-11-08 ENCOUNTER — APPOINTMENT (RX ONLY)
Dept: URBAN - METROPOLITAN AREA CLINIC 35 | Facility: CLINIC | Age: 82
Setting detail: DERMATOLOGY
End: 2022-11-08

## 2022-11-08 DIAGNOSIS — D22 MELANOCYTIC NEVI: ICD-10-CM

## 2022-11-08 DIAGNOSIS — L81.4 OTHER MELANIN HYPERPIGMENTATION: ICD-10-CM

## 2022-11-08 DIAGNOSIS — Z85.828 PERSONAL HISTORY OF OTHER MALIGNANT NEOPLASM OF SKIN: ICD-10-CM

## 2022-11-08 DIAGNOSIS — Z71.89 OTHER SPECIFIED COUNSELING: ICD-10-CM

## 2022-11-08 DIAGNOSIS — L21.8 OTHER SEBORRHEIC DERMATITIS: ICD-10-CM

## 2022-11-08 DIAGNOSIS — L57.0 ACTINIC KERATOSIS: ICD-10-CM

## 2022-11-08 DIAGNOSIS — L82.1 OTHER SEBORRHEIC KERATOSIS: ICD-10-CM

## 2022-11-08 PROBLEM — D22.5 MELANOCYTIC NEVI OF TRUNK: Status: ACTIVE | Noted: 2022-11-08

## 2022-11-08 PROCEDURE — ? TREATMENT REGIMEN

## 2022-11-08 PROCEDURE — 17003 DESTRUCT PREMALG LES 2-14: CPT

## 2022-11-08 PROCEDURE — ? COUNSELING

## 2022-11-08 PROCEDURE — 17000 DESTRUCT PREMALG LESION: CPT

## 2022-11-08 PROCEDURE — ? LIQUID NITROGEN

## 2022-11-08 PROCEDURE — 99213 OFFICE O/P EST LOW 20 MIN: CPT | Mod: 25

## 2022-11-08 ASSESSMENT — LOCATION ZONE DERM
LOCATION ZONE: TRUNK
LOCATION ZONE: ARM
LOCATION ZONE: EAR
LOCATION ZONE: NECK
LOCATION ZONE: SCALP
LOCATION ZONE: FACE

## 2022-11-08 ASSESSMENT — LOCATION DETAILED DESCRIPTION DERM
LOCATION DETAILED: RIGHT SUPERIOR UPPER BACK
LOCATION DETAILED: LEFT SUPERIOR PARIETAL SCALP
LOCATION DETAILED: RIGHT ANTITRAGUS
LOCATION DETAILED: RIGHT SUPERIOR ANTERIOR NECK
LOCATION DETAILED: RIGHT INFERIOR LATERAL FOREHEAD
LOCATION DETAILED: RIGHT MID-UPPER BACK
LOCATION DETAILED: RIGHT POSTERIOR SHOULDER
LOCATION DETAILED: RIGHT SUPERIOR LATERAL UPPER BACK
LOCATION DETAILED: LEFT SUPERIOR HELIX
LOCATION DETAILED: LEFT SUPERIOR CRUS OF ANTIHELIX

## 2022-11-08 ASSESSMENT — LOCATION SIMPLE DESCRIPTION DERM
LOCATION SIMPLE: LEFT EAR
LOCATION SIMPLE: RIGHT EAR
LOCATION SIMPLE: SCALP
LOCATION SIMPLE: RIGHT UPPER BACK
LOCATION SIMPLE: RIGHT ANTERIOR NECK
LOCATION SIMPLE: RIGHT SHOULDER
LOCATION SIMPLE: RIGHT FOREHEAD

## 2023-02-27 ENCOUNTER — OFFICE VISIT (OUTPATIENT)
Dept: NEUROLOGY | Facility: MEDICAL CENTER | Age: 83
End: 2023-02-27
Attending: PSYCHIATRY & NEUROLOGY
Payer: MEDICARE

## 2023-02-27 VITALS
OXYGEN SATURATION: 96 % | HEART RATE: 77 BPM | BODY MASS INDEX: 27.2 KG/M2 | SYSTOLIC BLOOD PRESSURE: 148 MMHG | DIASTOLIC BLOOD PRESSURE: 88 MMHG | TEMPERATURE: 97.5 F | WEIGHT: 189.6 LBS

## 2023-02-27 DIAGNOSIS — I10 PRIMARY HYPERTENSION: ICD-10-CM

## 2023-02-27 DIAGNOSIS — G20.A1 PARKINSON'S DISEASE (HCC): ICD-10-CM

## 2023-02-27 PROCEDURE — 99211 OFF/OP EST MAY X REQ PHY/QHP: CPT | Performed by: PSYCHIATRY & NEUROLOGY

## 2023-02-27 PROCEDURE — 99205 OFFICE O/P NEW HI 60 MIN: CPT | Performed by: PSYCHIATRY & NEUROLOGY

## 2023-02-27 ASSESSMENT — PATIENT HEALTH QUESTIONNAIRE - PHQ9: CLINICAL INTERPRETATION OF PHQ2 SCORE: 0

## 2023-02-27 NOTE — ASSESSMENT & PLAN NOTE
Pt with PD since 2010 and he feels like he needs to have an increase in his medications.  Patient was diagnosed at [facility] in 2017 and started on carbidopa levodopa 3 times a day.  It was increased to 25 100 carbidopa levodopa is in 2019 and he has maintained that dose.  He was initially worried it may have been affecting his sleep but he is unsure if this is the case.  He has not noticed any wearing off, freezing or dyskinesias.  He does not take any other medication.

## 2023-02-27 NOTE — ASSESSMENT & PLAN NOTE
Was noted today that patient's blood pressure is up at 148/88.  Patient states intermittently it has been up at the doctor's office but he thinks at home when he is taken it in the past its been lower.  However patient has not taken it regularly recently.  He does have a history of atrial fibrillation and previously was on medications flecainide and metoprolol.  Patient did have some ablation and he states he has not been in atrial fibrillation and follows with Dr. Green.  Patient's primary care is Dr. Mayorga and he has not seen him recently.  Patient does have a home blood pressure monitor.

## 2023-02-27 NOTE — PROGRESS NOTES
Chief Complaint   Patient presents with    New Patient     Movement disorder        Problem List Items Addressed This Visit       Parkinson's disease (HCC)     Pt with PD since 2010 and he feels like he needs to have an increase in his medications.         Relevant Medications    carbidopa-levodopa (SINEMET)  MG Tab    Carbidopa-Levodopa ER 48. MG Cap CR       History of present illness:  Hamlet Del Cid 82 y.o. male presents today for initial evaluation by me for Parkinson' s Disease.  Patient states he has had some episodes of increasing tremor especially under stress.  He wonders if he needs to get his medications adjusted.    Past medical history:   Past Medical History:   Diagnosis Date    Arthritis     bilateral knees    Bronchitis     Cancer ([facility]) 2003    throat Squamous Cell, with chemo/radiation, lymph nodes removal    PAF (paroxysmal atrial fibrillation) ([facility])     denies taking any meds, cardioversion last one was 2017, HX of SVT    Parkinson disease ([facility])     declines to take meds    Pneumonia     SVT (supraventricular tachycardia) ([facility])     denies taking any meds,cardioversion       Past surgical history:   Past Surgical History:   Procedure Laterality Date    BLEPHAROPLASTY Bilateral 7/29/2019    Procedure: BLEPHAROPLASTY- UPPER;  Surgeon: Jos Baron M.D.;  Location: [facility];  Service: Plastics    BROW LIFT Right 7/29/2019    Procedure: RHYTIDECTOMY, FOREHEAD- DIRECT BROW LIFT;  Surgeon: Jos Baron M.D.;  Location: [facility];  Service: Plastics    INGUINAL HERNIA REPAIR Bilateral 3/31/2017    Procedure: INGUINAL HERNIA REPAIR W/MESH;  Surgeon: Michael Malin M.D.;  Location: SURGERY SAME DAY [facility];  Service:     UMBILICAL HERNIA REPAIR N/A 3/31/2017    Procedure: UMBILICAL HERNIA REPAIR W/MESH;  Surgeon: Michael Malin M.D.;  Location: SURGERY SAME DAY [facility];  Service:     NECK DISSECTION  2003    HIP ARTHROPLASTY TOTAL  "Right     Hip Replacement, Total    SHOULDER ARTHROSCOPY W/ ROTATOR CUFF REPAIR Right     ORIF, FRACTURE, FEMUR Left     ORIF, Femur    INGUINAL HERNIA REPAIR Left     OTHER CARDIAC SURGERY      reports has had 6 cardioversions       Family history:   Family History   Problem Relation Age of Onset    Cancer Mother     Cancer Father     Cancer Brother        Social history:   Social History     Socioeconomic History    Marital status:      Spouse name: Not on file    Number of children: Not on file    Years of education: Not on file    Highest education level: Not on file   Occupational History    Not on file   Tobacco Use    Smoking status: Former     Years: 10.00     Types: Cigarettes     Quit date: 1987     Years since quittin.1    Smokeless tobacco: Never   Vaping Use    Vaping Use: Never used   Substance and Sexual Activity    Alcohol use: Not Currently     Comment: occ    Drug use: No    Sexual activity: Not on file   Other Topics Concern    Not on file   Social History Narrative    Not on file     Social Determinants of Health     Financial Resource Strain: Not on file   Food Insecurity: Not on file   Transportation Needs: Not on file   Physical Activity: Not on file   Stress: Not on file   Social Connections: Not on file   Intimate Partner Violence: Not on file   Housing Stability: Not on file       Current medications:   Current Outpatient Medications   Medication    carbidopa-levodopa (SINEMET)  MG Tab    Carbidopa-Levodopa ER 48. MG Cap CR     No current facility-administered medications for this visit.       Medication Allergy:  Allergies   Allergen Reactions    Cat Hair Extract Shortness of Breath     \"wheezing\"       Review of systems:   Constitutional: denies fever, night sweats, weight loss.   Eyes: denies acute vision change, eye pain or secretion.   Ears, Nose, Mouth, Throat: denies nasal secretion, nasal bleeding, difficulty swallowing, hearing loss, " tinnitus, vertigo, ear pain, acute dental problems, oral ulcers or lesions.   Endocrine: denies recent weight changes, heat or cold intolerance, polyuria, polydypsia, polyphagia,abnormal hair growth.  Cardiovascular: denies new onset of chest pain, palpitations, syncope, or dyspnea of exertion.  Pulmonary: denies shortness of breath, new onset of cough, hemoptysis, wheezing, chest pain or flu-like symptoms.   GI: denies nausea, vomiting, diarrhea, GI bleeding, change in appetite, abdominal pain, and change in bowel habits.  : denies dysuria, urinary incontinence, hematuria.  Heme/oncology: denies history of easy bruising or bleeding. No history of cancer, DVTor PE.  Allergy/immunology: denies hives/urticaria, or itching.   Dermatologic: denies new rash, or new skin lesions.  Musculoskeletal:denies joint swelling or pain, muscle pain, neck and back pain. Neurologic: denies headaches, acute visual changes, facial droopiness, muscle weakness (focal or generalized), paresthesias, anesthesia, ataxia, change in speech or language, memory loss, abnormal movements, seizures, loss of consciousness, or episodes of confusion.   Psychiatric: denies symptoms of depression, anxiety, hallucinations, mood swings or changes, suicidal or homicidal thoughts.     Physical examination:   Vitals:    02/27/23 1117   BP: (!) 148/88   BP Location: Left arm   Patient Position: Sitting   BP Cuff Size: Adult   Pulse: 77   Temp: 36.4 °C (97.5 °F)   TempSrc: Temporal   SpO2: 96%   Weight: 86 kg (189 lb 9.5 oz)     General: Patient in no acute distress, pleasant and cooperative.  HEENT: Normocephalic, no signs of acute trauma.   Neck: supple, no meningeal signs or carotid bruits. There is normal range of motion. No tenderness on exam.   Chest: clear to auscultation. No cough.   CV: RRR, no murmurs.   Skin: no signs of acute rashes or trauma.   Musculoskeletal: joints exhibit full range of motion, without any pain to palpation. There are no  signs of joint or muscle swelling. There is no tenderness to deep palpation of muscles.   Psychiatric: No hallucinatory behavior. Denies symptoms of depression or suicidal ideation. Mood and affect appear normal on exam.     NEUROLOGICAL EXAM:   Mental status, orientation: Awake, alert and fully oriented.   Speech and language: speech is clear and fluent. The patient is able to name, repeat and comprehend.   Memory: There is intact recollection of recent and remote events.   Cranial nerve exam: Pupils are 3-4 mm bilaterally and equally reactive to light and accommodation. Visual fields are intact by confrontation. Fundoscopic exam was unremarkable. There is no nystagmus on primary or secondary gaze. Intact full EOM in all directions of gaze. Face appears symmetric. Sensation in the face is intact to light touch. Uvula is midline. Palate elevates symmetrically. Tongue is midline and without any signs of tongue biting or fasciculations. Sternocleidomastoid muscles exhibit is normal strength bilaterally. Shoulder shrug is intact bilaterally.   Motor exam: Strength is 5/5 in all extremities. Tone is good left greater than right.  Lateral resting tremor with cogwheel rigidity..   Sensory exam reveals normal sense of light touch, proprioception, vibration and pinprick in all extremities.   Deep tendon reflexes:  2+ throughout. Plantar responses are flexor. There is no clonus.   Coordination: shows a normal finger-nose-finger. Normal rapidly alternating movements.   Gait: The patient was able to get up from seated position on first attempt without requiring assistance. Found to be steady when walking.  We decreased tandem gait.      ANCILLARY DATA REVIEWED:     Lab Data Review:  No results found for this or any previous visit (from the past 24 hour(s)).    Records reviewed: I reviewed records from [facility] for his history of Parkinson's and throat cancer.  I also reviewed cardiac and ER records from [facility].      Imaging:  Patient had some imaging at [facility].          ASSESSMENT AND PLAN:    1. Parkinson's disease (HCC)  Plan at this time is to increase his carbidopa levodopa to admit to nighttime extended release carbidopa levodopa.  Patient is not experiencing dyskinesias or motor fluctuations that he can tell.  He does have some difficulty with writing.  Patient is physically active and feels good while he is active.  He has not had any falls.  We also discussed his hypertension that was noticed here today and he will use his Omron cuff at home and manage a diary of blood pressures so he does not have untreated hypertension if that is the case.  Patient will discuss with his cardiologist and his primary care if it looks like his blood pressure is trending upward.  We discussed the importance of good blood pressure control to prevent stroke and heart attack.  - carbidopa-levodopa (SINEMET)  MG Tab; Take 2 Tablets by mouth 3 times a day for 90 days. Indications: 500Mg daily  Dispense: 540 Tablet; Refill: 0  - Carbidopa-Levodopa ER 48. MG Cap CR; Take 1 Capsule 24 hour sustained release by mouth every evening for 90 days.  Dispense: 90 Capsule; Refill: 3        FOLLOW-UP:   6-12 months     I spent 67 minutes with this patient, over fifty percent was spent counseling patient on their condition, best management practices, reviewing test results and risks and benefits of treatment.     EDUCATION AND COUNSELING:  -Discussed regular exercise program and prevention of cardiovascular disease, including stroke.   -Discussed healthy lifestyle, including: healthy diet (rich in fruits, vegetables, nuts and healthy oils); proper hydration, and adequate sleep hygiene (allowing 7-8 hrs of overnight sleep).        Melissa Bloch, MD  Clinical  of Neurology [employer] [employer] [facility] [employer].   Diplomate in Neurology.   Office: [phone]  Fax: [phone]

## 2023-10-27 ENCOUNTER — HOSPITAL ENCOUNTER (EMERGENCY)
Facility: MEDICAL CENTER | Age: 83
End: 2023-10-28
Attending: STUDENT IN AN ORGANIZED HEALTH CARE EDUCATION/TRAINING PROGRAM
Payer: MEDICARE

## 2023-10-27 DIAGNOSIS — R00.2 PALPITATIONS: ICD-10-CM

## 2023-10-27 LAB
ALBUMIN SERPL BCP-MCNC: 4.4 G/DL (ref 3.2–4.9)
ALBUMIN/GLOB SERPL: 1.6 G/DL
ALP SERPL-CCNC: 89 U/L (ref 30–99)
ALT SERPL-CCNC: <5 U/L (ref 2–50)
ANION GAP SERPL CALC-SCNC: 11 MMOL/L (ref 7–16)
AST SERPL-CCNC: 17 U/L (ref 12–45)
BASOPHILS # BLD AUTO: 0.6 % (ref 0–1.8)
BASOPHILS # BLD: 0.04 K/UL (ref 0–0.12)
BILIRUB SERPL-MCNC: 0.5 MG/DL (ref 0.1–1.5)
BUN SERPL-MCNC: 13 MG/DL (ref 8–22)
CALCIUM ALBUM COR SERPL-MCNC: 9 MG/DL (ref 8.5–10.5)
CALCIUM SERPL-MCNC: 9.3 MG/DL (ref 8.4–10.2)
CHLORIDE SERPL-SCNC: 99 MMOL/L (ref 96–112)
CO2 SERPL-SCNC: 26 MMOL/L (ref 20–33)
CREAT SERPL-MCNC: 0.93 MG/DL (ref 0.5–1.4)
EKG IMPRESSION: NORMAL
EKG IMPRESSION: NORMAL
EOSINOPHIL # BLD AUTO: 0.48 K/UL (ref 0–0.51)
EOSINOPHIL NFR BLD: 7.2 % (ref 0–6.9)
ERYTHROCYTE [DISTWIDTH] IN BLOOD BY AUTOMATED COUNT: 41.4 FL (ref 35.9–50)
GFR SERPLBLD CREATININE-BSD FMLA CKD-EPI: 82 ML/MIN/1.73 M 2
GLOBULIN SER CALC-MCNC: 2.8 G/DL (ref 1.9–3.5)
GLUCOSE SERPL-MCNC: 122 MG/DL (ref 65–99)
HCT VFR BLD AUTO: 45.9 % (ref 42–52)
HGB BLD-MCNC: 15.1 G/DL (ref 14–18)
IMM GRANULOCYTES # BLD AUTO: 0.01 K/UL (ref 0–0.11)
IMM GRANULOCYTES NFR BLD AUTO: 0.1 % (ref 0–0.9)
LYMPHOCYTES # BLD AUTO: 2.2 K/UL (ref 1–4.8)
LYMPHOCYTES NFR BLD: 32.8 % (ref 22–41)
MAGNESIUM SERPL-MCNC: 2.2 MG/DL (ref 1.5–2.5)
MCH RBC QN AUTO: 28.6 PG (ref 27–33)
MCHC RBC AUTO-ENTMCNC: 32.9 G/DL (ref 32.3–36.5)
MCV RBC AUTO: 86.9 FL (ref 81.4–97.8)
MONOCYTES # BLD AUTO: 0.53 K/UL (ref 0–0.85)
MONOCYTES NFR BLD AUTO: 7.9 % (ref 0–13.4)
NEUTROPHILS # BLD AUTO: 3.45 K/UL (ref 1.82–7.42)
NEUTROPHILS NFR BLD: 51.4 % (ref 44–72)
NRBC # BLD AUTO: 0 K/UL
NRBC BLD-RTO: 0 /100 WBC (ref 0–0.2)
PLATELET # BLD AUTO: 207 K/UL (ref 164–446)
PMV BLD AUTO: 9.9 FL (ref 9–12.9)
POTASSIUM SERPL-SCNC: 4.1 MMOL/L (ref 3.6–5.5)
PROT SERPL-MCNC: 7.2 G/DL (ref 6–8.2)
RBC # BLD AUTO: 5.28 M/UL (ref 4.7–6.1)
SODIUM SERPL-SCNC: 136 MMOL/L (ref 135–145)
TROPONIN T SERPL-MCNC: 11 NG/L (ref 6–19)
WBC # BLD AUTO: 6.7 K/UL (ref 4.8–10.8)

## 2023-10-27 PROCEDURE — 36415 COLL VENOUS BLD VENIPUNCTURE: CPT

## 2023-10-27 PROCEDURE — 83735 ASSAY OF MAGNESIUM: CPT

## 2023-10-27 PROCEDURE — 93005 ELECTROCARDIOGRAM TRACING: CPT

## 2023-10-27 PROCEDURE — 99285 EMERGENCY DEPT VISIT HI MDM: CPT

## 2023-10-27 PROCEDURE — 85025 COMPLETE CBC W/AUTO DIFF WBC: CPT

## 2023-10-27 PROCEDURE — 93005 ELECTROCARDIOGRAM TRACING: CPT | Performed by: STUDENT IN AN ORGANIZED HEALTH CARE EDUCATION/TRAINING PROGRAM

## 2023-10-27 PROCEDURE — 700105 HCHG RX REV CODE 258: Performed by: STUDENT IN AN ORGANIZED HEALTH CARE EDUCATION/TRAINING PROGRAM

## 2023-10-27 PROCEDURE — 84484 ASSAY OF TROPONIN QUANT: CPT

## 2023-10-27 PROCEDURE — 80053 COMPREHEN METABOLIC PANEL: CPT

## 2023-10-27 RX ORDER — ETOMIDATE 2 MG/ML
10 INJECTION INTRAVENOUS ONCE
Status: DISCONTINUED | OUTPATIENT
Start: 2023-10-27 | End: 2023-10-28 | Stop reason: HOSPADM

## 2023-10-27 RX ORDER — SODIUM CHLORIDE 9 MG/ML
500 INJECTION, SOLUTION INTRAVENOUS ONCE
Status: COMPLETED | OUTPATIENT
Start: 2023-10-27 | End: 2023-10-27

## 2023-10-27 RX ORDER — ONDANSETRON 2 MG/ML
4 INJECTION INTRAMUSCULAR; INTRAVENOUS ONCE
Status: DISCONTINUED | OUTPATIENT
Start: 2023-10-27 | End: 2023-10-28 | Stop reason: HOSPADM

## 2023-10-27 RX ORDER — ETOMIDATE 2 MG/ML
7 INJECTION INTRAVENOUS ONCE
Status: DISCONTINUED | OUTPATIENT
Start: 2023-10-27 | End: 2023-10-27

## 2023-10-27 RX ADMIN — SODIUM CHLORIDE 500 ML: 9 INJECTION, SOLUTION INTRAVENOUS at 23:30

## 2023-10-27 ASSESSMENT — PAIN DESCRIPTION - PAIN TYPE: TYPE: ACUTE PAIN

## 2023-10-28 VITALS
WEIGHT: 191.8 LBS | SYSTOLIC BLOOD PRESSURE: 165 MMHG | RESPIRATION RATE: 18 BRPM | OXYGEN SATURATION: 95 % | BODY MASS INDEX: 27.46 KG/M2 | DIASTOLIC BLOOD PRESSURE: 83 MMHG | HEART RATE: 60 BPM | HEIGHT: 70 IN | TEMPERATURE: 98.4 F

## 2023-10-28 LAB — EKG IMPRESSION: NORMAL

## 2023-10-28 PROCEDURE — 93005 ELECTROCARDIOGRAM TRACING: CPT

## 2023-10-28 ASSESSMENT — HEART SCORE
RISK FACTORS: NO KNOWN RISK FACTORS
HEART SCORE: 2
HISTORY: SLIGHTLY SUSPICIOUS
ECG: NORMAL
TROPONIN: LESS THAN OR EQUAL TO NORMAL LIMIT
AGE: 65+

## 2023-10-28 NOTE — ED TRIAGE NOTES
"BP (!) 150/91   Pulse 73   Temp 36.9 °C (98.5 °F) (Temporal)   Resp 18   Ht 1.778 m (5' 10\")   Wt 87 kg (191 lb 12.8 oz)   SpO2 96%   BMI 27.52 kg/m²    "

## 2023-10-28 NOTE — ED TRIAGE NOTES
"Chief Complaint   Patient presents with    Irregular Heart Beat     84 yo male ambulates to triage with reports of \"I am in atrial fib.\"  Reports he has felt the irregular HR for the past 1-2 hours.  Denies SOB reports he feels anxious.  Patient getting EKG     Ht 1.778 m (5' 10\")   Wt 87 kg (191 lb 12.8 oz)   BMI 27.52 kg/m²     "

## 2023-10-28 NOTE — ED PROVIDER NOTES
"CHIEF COMPLAINT  Chief Complaint   Patient presents with    Irregular Heart Beat     82 yo male ambulates to triage with reports of \"I am in atrial fib.\"  Reports he has felt the irregular HR for the past 1-2 hours.  Denies SOB reports he feels anxious.  Patient getting EKG        LIMITATION TO HISTORY   Select: None    HPI    Hamlet Del Cid is a 83 y.o. male who presents to the Emergency Department evaluation of palpitations.  Patient has a history of paroxysmal atrial fibrillation states that his last episode of his A-fib was over 2 years ago.  He was a previously on metoprolol flecainide as well as Xarelto though discontinued all these medications given his good rhythm control.  This evening approximately 2 hours prior to arrival, he began to experience an irregular heartbeat feeling of palpitations which are typical for his prior atrial fibrillation episodes.  Denies any dizziness lightheadedness syncope no chest pain shortness of breath abdominal pain nausea vomiting or other complaints.    OUTSIDE HISTORIAN(S):  Select: wife states his    EXTERNAL RECORDS REVIEWED  Select: Other Interval History: It has been 1 year since he had any atrial fibrillation.  He is off flecainide metoprolol and Xarelto.  Feeling great and no interest in resuming any medications if he does not have Afib recurrence.         PAST MEDICAL HISTORY  Past Medical History:   Diagnosis Date    Arthritis     bilateral knees    Bronchitis     Cancer (HCC) 2003    throat Squamous Cell, with chemo/radiation, lymph nodes removal    PAF (paroxysmal atrial fibrillation) (HCC)     denies taking any meds, cardioversion last one was 2017, HX of SVT    Parkinson disease     declines to take meds    Pneumonia     SVT (supraventricular tachycardia)     denies taking any meds,cardioversion     .    SURGICAL HISTORY  Past Surgical History:   Procedure Laterality Date    BLEPHAROPLASTY Bilateral 7/29/2019    Procedure: BLEPHAROPLASTY- UPPER; "  Surgeon: Jos Baron M.D.;  Location: SURGERY [facility];  Service: Plastics    BROW LIFT Right 2019    Procedure: RHYTIDECTOMY, FOREHEAD- DIRECT BROW LIFT;  Surgeon: Jos Baron M.D.;  Location: SURGERY [facility];  Service: Plastics    INGUINAL HERNIA REPAIR Bilateral 3/31/2017    Procedure: INGUINAL HERNIA REPAIR W/MESH;  Surgeon: Michael Malin M.D.;  Location: SURGERY SAME DAY [facility];  Service:     UMBILICAL HERNIA REPAIR N/A 3/31/2017    Procedure: UMBILICAL HERNIA REPAIR W/MESH;  Surgeon: Michael Malin M.D.;  Location: SURGERY SAME DAY [facility];  Service:     NECK DISSECTION  2003    HIP ARTHROPLASTY TOTAL Right     Hip Replacement, Total    SHOULDER ARTHROSCOPY W/ ROTATOR CUFF REPAIR Right     ORIF, FRACTURE, FEMUR Left     ORIF, Femur    INGUINAL HERNIA REPAIR Left     OTHER CARDIAC SURGERY      reports has had 6 cardioversions         FAMILY HISTORY  Family History   Problem Relation Age of Onset    Cancer Mother     Cancer Father     Cancer Brother           SOCIAL HISTORY  Social History     Socioeconomic History    Marital status:      Spouse name: Not on file    Number of children: Not on file    Years of education: Not on file    Highest education level: Not on file   Occupational History    Not on file   Tobacco Use    Smoking status: Former     Current packs/day: 0.00     Types: Cigarettes     Start date: 1977     Quit date: 1987     Years since quittin.8    Smokeless tobacco: Never   Vaping Use    Vaping Use: Never used   Substance and Sexual Activity    Alcohol use: Not Currently     Comment: occ    Drug use: No    Sexual activity: Not on file   Other Topics Concern    Not on file   Social History Narrative    Not on file     Social Determinants of Health     Financial Resource Strain: Not on file   Food Insecurity: Not on file   Transportation Needs: Not on file   Physical Activity: Not on file   Stress: Not on file  "  Social Connections: Not on file   Intimate Partner Violence: Not on file   Housing Stability: Not on file         CURRENT MEDICATIONS  No current facility-administered medications on file prior to encounter.     No current outpatient medications on file prior to encounter.           ALLERGIES  Allergies   Allergen Reactions    Cat Hair Extract Shortness of Breath     \"wheezing\"       PHYSICAL EXAM  VITAL SIGNS:BP (!) 165/83   Pulse 60   Temp 36.9 °C (98.4 °F) (Temporal)   Resp 18   Ht 1.778 m (5' 10\")   Wt 87 kg (191 lb 12.8 oz)   SpO2 95%   BMI 27.52 kg/m²       VITALS - vital signs documented prior to this note have been reviewed and noted,  GENERAL - awake, alert, oriented, GCS 15, no apparent distress, non-toxic  appearing  HEENT - normocephalic, atraumatic, pupils equal, sclera anicteric, mucus  membranes moist  NECK - supple, no meningismus, full active range of motion, trachea midline  CARDIOVASCULAR - regular rate/rhythm, no murmurs/gallops/rubs  PULMONARY - no respiratory distress, speaking in full sentences, clear to  auscultation bilaterally, no wheezing/ronchi/rales, no accessory muscle use  GASTROINTESTINAL - soft, non-tender, non-distended, no rebound, guarding,  or peritonitis  GENITOURINARY - Deferred  NEUROLOGIC - Awake alert, normal mental status, speech fluid, cognition  normal, moves all extremities  MUSCULOSKELETAL - no obvious asymmetry or deformities present  EXTREMITIES - warm, well-perfused, no cyanosis or significant edema  DERMATOLOGIC - warm, dry, no rashes, no jaundice  PSYCHIATRIC - normal affect, normal insight, normal concentration    DIAGNOSTIC STUDIES / PROCEDURES  EKG  I have independently interpreted this EKG  Interpreted below by myself as   Initial EKG showed a regular sinus rhythm with artifact, there is no ST elevation depression or T wave inversion to suggest ischemia normal QRS QTc interval was a left axis deviation no STEMI pattern.  Given the artifact concern for " A-fib repeat her EKG is ordered, interpretation was sinus rhythm with artifact no STEMI       Report   Date Value Ref Range Status   10/28/2023       [facility] Emergency Dept.    Test Date:  2023-10-28  Pt Name:    MARK JENKINS                   Department: [facility]  MRN:        [id number]                      [facility]  Gender:     Male                         Technician: scooby  :        [id number]                   Requested By:ER TRIAGE PROTOCOL  Order #:    [id number]                    Reading MD: Jonathan Parada    Measurements  Intervals                                Axis  Rate:       61                           P:          17  IA:         169                          QRS:        -31  QRSD:       91                           T:          33  QT:         400  QTc:        403    Interpretive Statements  Sinus rhythm  Left axis deviation  RSR' in V1 or V2, right VCD or RVH  Compared to ECG 10/27/2023 23:07:59  Right ventricular hypertrophy now present  RSR' in V1 or V2 now present  Electronically Signed On 10- 00:44:15 PDT by Jonathan Parada              Repeat ekg shows a sinus rythm  LABS  Labs Reviewed   CBC WITH DIFFERENTIAL - Abnormal; Notable for the following components:       Result Value    Eosinophils 7.20 (*)     All other components within normal limits    Narrative:     Biotin intake of greater than 5 mg per day may interfere with                  troponin levels, causing false low values.   COMP METABOLIC PANEL - Abnormal; Notable for the following components:    Glucose 122 (*)     All other components within normal limits    Narrative:     Biotin intake of greater than 5 mg per day may interfere with                  troponin levels, causing false low values.   TROPONIN    Narrative:     Biotin intake of greater than 5 mg per day may interfere with                  troponin levels, causing false low values.   MAGNESIUM    Narrative:     Biotin intake of  greater than 5 mg per day may interfere with                  troponin levels, causing false low values.   ESTIMATED GFR    Narrative:     Biotin intake of greater than 5 mg per day may interfere with                  troponin levels, causing false low values.       Troponin is negative heart score 2 doubt ACS CMP does not demonstrate any electrolyte abnormality CBC does not show evidence of anemia      Radiologist interpretation:   No orders to display        COURSE & MEDICAL DECISION MAKING    ED COURSE:    ED Observation Status? no      INITIAL ASSESSMENT, COURSE AND PLAN  Care Narrative: Patient presented for evaluation of palpitations and concerns that he was in atrial fibrillation as he does have a history of paroxysmal atrial fibrillation.  Multiple life-threatening diagnoses considered including but not limited to arrhythmia electrolyte abnormality anemia dehydration atypical ACS among many other considerations.  Patient's initial EKG obtained in triage did appear to show a sinus rhythm, though was slightly difficult to interpret given the degree of artifact thus a repeat EKG was obtained which did demonstrate a normal sinus rhythm.  Labs were obtained did not demonstrate any evidence of metabolic derangements troponin was negative his heart score is 2.  He was observed in the emergency department 2 hours with no evidence of ectopy or arrhythmia on his heart monitor and a third EKG was obtained which again demonstrated normal sinus rhythm.  Palpitations did resolve while being observed in the emergency department discussed this possibility of this being a paroxysmal arrhythmia recommend he return if he developed any recurrent palpitations chest pain or other complaints recommend he follow-up with his cardiologist and PCP and return with any other concerns.  Patient did feel comfortable this plan was discharged in stable condition             ADDITIONAL PROBLEM LIST    DISPOSITION AND  DISCUSSIONS      Escalation of care considered, and ultimately not performed:acute inpatient care management, however at this time, the patient is most appropriate for outpatient management    Decision tools and prescription drugs considered including, but not limited to: HEART Score 2 .    FINAL DIAGNOSIS  1. Palpitations             Electronically signed by: Jonathan Parada DO ,1:51 AM 10/27/23

## 2023-10-31 ENCOUNTER — OFFICE VISIT (OUTPATIENT)
Dept: CARDIOLOGY | Facility: MEDICAL CENTER | Age: 83
End: 2023-10-31
Attending: INTERNAL MEDICINE
Payer: MEDICARE

## 2023-10-31 VITALS
DIASTOLIC BLOOD PRESSURE: 70 MMHG | HEIGHT: 70 IN | WEIGHT: 193 LBS | BODY MASS INDEX: 27.63 KG/M2 | OXYGEN SATURATION: 97 % | HEART RATE: 71 BPM | RESPIRATION RATE: 14 BRPM | SYSTOLIC BLOOD PRESSURE: 122 MMHG

## 2023-10-31 DIAGNOSIS — I48.0 PAF (PAROXYSMAL ATRIAL FIBRILLATION) (HCC): ICD-10-CM

## 2023-10-31 PROCEDURE — 93005 ELECTROCARDIOGRAM TRACING: CPT | Performed by: INTERNAL MEDICINE

## 2023-10-31 PROCEDURE — 3078F DIAST BP <80 MM HG: CPT | Performed by: INTERNAL MEDICINE

## 2023-10-31 PROCEDURE — 3074F SYST BP LT 130 MM HG: CPT | Performed by: INTERNAL MEDICINE

## 2023-10-31 PROCEDURE — 99214 OFFICE O/P EST MOD 30 MIN: CPT | Performed by: INTERNAL MEDICINE

## 2023-10-31 PROCEDURE — 99212 OFFICE O/P EST SF 10 MIN: CPT | Performed by: INTERNAL MEDICINE

## 2023-10-31 PROCEDURE — 99211 OFF/OP EST MAY X REQ PHY/QHP: CPT | Performed by: INTERNAL MEDICINE

## 2023-10-31 RX ORDER — FLECAINIDE ACETATE 150 MG/1
150 TABLET ORAL
Qty: 30 TABLET | Refills: 0 | Status: SHIPPED | OUTPATIENT
Start: 2023-10-31

## 2023-10-31 RX ORDER — ASPIRIN 81 MG/1
81 TABLET ORAL DAILY
COMMUNITY

## 2023-10-31 ASSESSMENT — FIBROSIS 4 INDEX: FIB4 SCORE: 3.14

## 2023-10-31 NOTE — PROGRESS NOTES
Arrhythmia Clinic Note (Established patient)    DOS: 10/31/2023    Chief complaint/Reason for consult: pAF    Interval History: 84 y/o M with pAF. Refused treatment. Did well without afib for over 2 years. Last week had palpitations and went to ED by the time he was there he self converted to NSR. Here to discuss. Had palpitations. No chest pain.     ROS (+ highlighted in bold):  Constitutional: Fevers/chills/fatigue/weightloss  HEENT: Blurry vision/eye pain/sore throat/hearing loss  Respiratory: Shortness of breath/cough  Cardiovascular: Chest pain/palpitations/edema/orthopnea/syncope  GI: Nausea/vomitting/diarrhea  MSK: Arthralgias/myagias/muscle weakness  Skin: Rash/sores  Neurological: Numbness/tremors/vertigo  Endocrine: Excessive thirst/polyuria/cold intolerance/heat intolerance  Psych: Depression/anxiety    Past Medical History:   Diagnosis Date    Arthritis     bilateral knees    Bronchitis     Cancer (HCC) 2003    throat Squamous Cell, with chemo/radiation, lymph nodes removal    PAF (paroxysmal atrial fibrillation) (HCC)     denies taking any meds, cardioversion last one was 2017, HX of SVT    Parkinson disease     declines to take meds    Pneumonia     SVT (supraventricular tachycardia)     denies taking any meds,cardioversion       Past Surgical History:   Procedure Laterality Date    BLEPHAROPLASTY Bilateral 7/29/2019    Procedure: BLEPHAROPLASTY- UPPER;  Surgeon: Jos Baron M.D.;  Location: SURGERY [facility];  Service: Plastics    BROW LIFT Right 7/29/2019    Procedure: RHYTIDECTOMY, FOREHEAD- DIRECT BROW LIFT;  Surgeon: Jos Baron M.D.;  Location: SURGERY [facility];  Service: Plastics    INGUINAL HERNIA REPAIR Bilateral 3/31/2017    Procedure: INGUINAL HERNIA REPAIR W/MESH;  Surgeon: Michael Malin M.D.;  Location: SURGERY SAME DAY [facility];  Service:     UMBILICAL HERNIA REPAIR N/A 3/31/2017    Procedure: UMBILICAL HERNIA REPAIR W/MESH;  Surgeon: Michael GUTIERREZ  "RACHEL Malin;  Location: SURGERY SAME DAY [facility];  Service:     NECK DISSECTION  2003    HIP ARTHROPLASTY TOTAL Right     Hip Replacement, Total    SHOULDER ARTHROSCOPY W/ ROTATOR CUFF REPAIR Right     ORIF, FRACTURE, FEMUR Left     ORIF, Femur    INGUINAL HERNIA REPAIR Left     OTHER CARDIAC SURGERY      reports has had 6 cardioversions       Social History     Socioeconomic History    Marital status:      Spouse name: Not on file    Number of children: Not on file    Years of education: Not on file    Highest education level: Not on file   Occupational History    Not on file   Tobacco Use    Smoking status: Former     Current packs/day: 0.00     Types: Cigarettes     Start date: 1977     Quit date: 1987     Years since quittin.8    Smokeless tobacco: Never   Vaping Use    Vaping Use: Never used   Substance and Sexual Activity    Alcohol use: Not Currently     Comment: occ    Drug use: No    Sexual activity: Not on file   Other Topics Concern    Not on file   Social History Narrative    Not on file     Social Determinants of Health     Financial Resource Strain: Not on file   Food Insecurity: Not on file   Transportation Needs: Not on file   Physical Activity: Not on file   Stress: Not on file   Social Connections: Not on file   Intimate Partner Violence: Not on file   Housing Stability: Not on file       Family History   Problem Relation Age of Onset    Cancer Mother     Cancer Father     Cancer Brother        Allergies   Allergen Reactions    Cat Hair Extract Shortness of Breath     \"wheezing\"       Current Outpatient Medications   Medication Sig Dispense Refill    carbidopa-levodopa (SINEMET)  MG Tab       flecainide (TAMBOCOR) 150 MG Tab Take 1 Tablet by mouth 1 time a day as needed (atrial fibrillation). 30 Tablet 0    aspirin 81 MG EC tablet Take 81 mg by mouth every day.       No current facility-administered medications for this visit.       Physical " "Exam:  Vitals:    10/31/23 0900   BP: 122/70   BP Location: Left arm   Patient Position: Sitting   BP Cuff Size: Adult   Pulse: 71   Resp: 14   SpO2: 97%   Weight: 87.5 kg (193 lb)   Height: 1.778 m (5' 10\")     General appearance: NAD, conversant   Eyes: anicteric sclerae, moist conjunctivae; no lid-lag; PERRLA  HENT: Atraumatic; oropharynx clear with moist mucous membranes and no mucosal ulcerations; normal hard and soft palate  Neck: Trachea midline; FROM, supple, no thyromegaly or lymphadenopathy  Lungs: CTA, with normal respiratory effort and no intercostal retractions  CV: RRR, no MRGs, no JVD  Abdomen: Soft, non-tender; no masses or HSM  Extremities: No peripheral edema or extremity lymphadenopathy  Skin: Normal temperature, turgor and texture; no rash, ulcers or subcutaneous nodules  Psych: Appropriate affect, alert and oriented to person, place and time    Data:  Lipids:   Lab Results   Component Value Date/Time    CHOLSTRLTOT 181 04/13/2012 10:34 AM    TRIGLYCERIDE 94 04/13/2012 10:34 AM    HDL 56 04/13/2012 10:34 AM     (H) 04/13/2012 10:34 AM        BMP:  Lab Results   Component Value Date/Time    SODIUM 136 10/27/2023 2305    POTASSIUM 4.1 10/27/2023 2305    CHLORIDE 99 10/27/2023 2305    CO2 26 10/27/2023 2305    GLUCOSE 122 (H) 10/27/2023 2305    BUN 13 10/27/2023 2305    CREATININE 0.93 10/27/2023 2305    CALCIUM 9.3 10/27/2023 2305    ANION 11.0 10/27/2023 2305        TSH:   Lab Results   Component Value Date/Time    TSHULTRASEN 3.750 12/16/2019 2255        THYROXINE (T4):   No results found for: \"FREEDIR\"     CBC:   Lab Results   Component Value Date/Time    WBC 6.7 10/27/2023 11:05 PM    RBC 5.28 10/27/2023 11:05 PM    HEMOGLOBIN 15.1 10/27/2023 11:05 PM    HEMATOCRIT 45.9 10/27/2023 11:05 PM    MCV 86.9 10/27/2023 11:05 PM    MCH 28.6 10/27/2023 11:05 PM    MCHC 32.9 10/27/2023 11:05 PM    RDW 41.4 10/27/2023 11:05 PM    PLATELETCT 207 10/27/2023 11:05 PM    MPV 9.9 10/27/2023 11:05 PM    " NEUTSPOLYS 51.40 10/27/2023 11:05 PM    LYMPHOCYTES 32.80 10/27/2023 11:05 PM    MONOCYTES 7.90 10/27/2023 11:05 PM    EOSINOPHILS 7.20 (H) 10/27/2023 11:05 PM    BASOPHILS 0.60 10/27/2023 11:05 PM    IMMGRAN 0.10 10/27/2023 11:05 PM    NRBC 0.00 10/27/2023 11:05 PM    NEUTS 3.45 10/27/2023 11:05 PM    LYMPHS 2.20 10/27/2023 11:05 PM    MONOS 0.53 10/27/2023 11:05 PM    EOS 0.48 10/27/2023 11:05 PM    BASO 0.04 10/27/2023 11:05 PM    IMMGRANAB 0.01 10/27/2023 11:05 PM    NRBCAB 0.00 10/27/2023 11:05 PM        CBC w/o DIFF  Lab Results   Component Value Date/Time    WBC 6.7 10/27/2023 11:05 PM    RBC 5.28 10/27/2023 11:05 PM    HEMOGLOBIN 15.1 10/27/2023 11:05 PM    MCV 86.9 10/27/2023 11:05 PM    MCH 28.6 10/27/2023 11:05 PM    MCHC 32.9 10/27/2023 11:05 PM    RDW 41.4 10/27/2023 11:05 PM    MPV 9.9 10/27/2023 11:05 PM       Prior echo/stress reviewed: Normal EF    EKG interpreted by me: NSR    Impression/Plan:  1. PAF (paroxysmal atrial fibrillation) ([facility])  EKG    flecainide (TAMBOCOR) 150 MG Tab        pAF  High risk medication use    - Prior tolerance of PRN flecainide will re-prescribe this 150mg daily PRN, ECG reviewed  - He refuses OAC, discussed ROCIO-C and not interested at this time, he will agree to ASA though    Annual FV    Orlando Pickett MD  Cardiac Electrophysiology

## 2023-11-08 ENCOUNTER — APPOINTMENT (RX ONLY)
Dept: URBAN - METROPOLITAN AREA CLINIC 35 | Facility: CLINIC | Age: 83
Setting detail: DERMATOLOGY
End: 2023-11-08

## 2023-11-08 DIAGNOSIS — D22 MELANOCYTIC NEVI: ICD-10-CM

## 2023-11-08 DIAGNOSIS — Z71.89 OTHER SPECIFIED COUNSELING: ICD-10-CM

## 2023-11-08 DIAGNOSIS — L81.4 OTHER MELANIN HYPERPIGMENTATION: ICD-10-CM

## 2023-11-08 DIAGNOSIS — L82.1 OTHER SEBORRHEIC KERATOSIS: ICD-10-CM

## 2023-11-08 DIAGNOSIS — D485 NEOPLASM OF UNCERTAIN BEHAVIOR OF SKIN: ICD-10-CM

## 2023-11-08 DIAGNOSIS — L21.8 OTHER SEBORRHEIC DERMATITIS: ICD-10-CM | Status: INADEQUATELY CONTROLLED

## 2023-11-08 PROBLEM — D48.5 NEOPLASM OF UNCERTAIN BEHAVIOR OF SKIN: Status: ACTIVE | Noted: 2023-11-08

## 2023-11-08 PROBLEM — D22.5 MELANOCYTIC NEVI OF TRUNK: Status: ACTIVE | Noted: 2023-11-08

## 2023-11-08 PROCEDURE — ? PRESCRIPTION

## 2023-11-08 PROCEDURE — ? BIOPSY BY SHAVE METHOD

## 2023-11-08 PROCEDURE — 99213 OFFICE O/P EST LOW 20 MIN: CPT | Mod: 25

## 2023-11-08 PROCEDURE — ? COUNSELING

## 2023-11-08 PROCEDURE — ? TREATMENT REGIMEN

## 2023-11-08 PROCEDURE — 69100 BIOPSY OF EXTERNAL EAR: CPT

## 2023-11-08 RX ORDER — KETOCONAZOLE 20 MG/ML
1 SHAMPOO, SUSPENSION TOPICAL
Qty: 120 | Refills: 2 | Status: ERX | COMMUNITY
Start: 2023-11-08

## 2023-11-08 RX ADMIN — KETOCONAZOLE 1: 20 SHAMPOO, SUSPENSION TOPICAL at 00:00

## 2023-11-08 ASSESSMENT — LOCATION DETAILED DESCRIPTION DERM
LOCATION DETAILED: RIGHT MID-UPPER BACK
LOCATION DETAILED: RIGHT SUPERIOR UPPER BACK
LOCATION DETAILED: RIGHT SUPERIOR LATERAL UPPER BACK
LOCATION DETAILED: LEFT INFERIOR HELIX
LOCATION DETAILED: RIGHT POSTERIOR SHOULDER
LOCATION DETAILED: LEFT SUPERIOR PARIETAL SCALP
LOCATION DETAILED: RIGHT INFERIOR LATERAL FOREHEAD

## 2023-11-08 ASSESSMENT — LOCATION SIMPLE DESCRIPTION DERM
LOCATION SIMPLE: RIGHT SHOULDER
LOCATION SIMPLE: LEFT EAR
LOCATION SIMPLE: RIGHT UPPER BACK
LOCATION SIMPLE: SCALP
LOCATION SIMPLE: RIGHT FOREHEAD

## 2023-11-08 ASSESSMENT — LOCATION ZONE DERM
LOCATION ZONE: SCALP
LOCATION ZONE: EAR
LOCATION ZONE: FACE
LOCATION ZONE: ARM
LOCATION ZONE: TRUNK

## 2023-11-09 RX ORDER — KETOCONAZOLE 20 MG/ML
1 SHAMPOO, SUSPENSION TOPICAL
Qty: 120 | Refills: 2 | Status: ERX

## 2023-11-20 LAB — EKG IMPRESSION: NORMAL

## 2023-11-20 PROCEDURE — 93010 ELECTROCARDIOGRAM REPORT: CPT | Performed by: INTERNAL MEDICINE

## 2023-11-21 NOTE — PROGRESS NOTES
Pt has 50-69% stenosis in his Left ICA and has question about need for vascular surgery consult. I will discuss with vascular medicine.

## 2023-11-29 ENCOUNTER — PATIENT MESSAGE (OUTPATIENT)
Dept: HEALTH INFORMATION MANAGEMENT | Facility: OTHER | Age: 83
End: 2023-11-29

## 2024-01-30 DIAGNOSIS — G20.A2 PARKINSON'S DISEASE WITHOUT DYSKINESIA, WITH FLUCTUATING MANIFESTATIONS (HCC): ICD-10-CM

## 2024-01-30 NOTE — TELEPHONE ENCOUNTER
Received request via: Pharmacy    Medication Name/Dosage carbidopa-levodopa (SINEMET)  MG Tab     When was medication last prescribed 10/31/23      How many refills were previously provided 0    How many Refills does he patient have left from last prescription 0    Was the patient seen in the last year in this department? Yes   Date of last office visit 02/27/23     Per last Neurology Office Visit, when was the date of next follow up visit set for?                            Date of office visit follow up request 6-12 months     Does the patient have an upcoming appointment? No   If yes, when will call to schedule              If no, schedule appointment will call to schedule     Does the patient have [facility] and need 100 day supply (blood pressure, diabetes and cholesterol meds only)? Patient does not have SCP

## 2024-01-31 ENCOUNTER — TELEPHONE (OUTPATIENT)
Dept: NEUROLOGY | Facility: MEDICAL CENTER | Age: 84
End: 2024-01-31
Payer: MEDICARE

## 2024-01-31 NOTE — TELEPHONE ENCOUNTER
Received NEW PA request via [facility]  for carbidopa-levodopa (SINEMET)  MG Tab. (Quantity:540, Day Supply:90)     Insurance: [employer]  Member ID:  [id number]  BIN: [id number]  PCN: [id number]  Group: [id number]     Ran Test claim via [place] & medication  PHARMACY LOCKOUT//[employer] PATIENT

## 2024-03-20 DIAGNOSIS — G20.A2 PARKINSON'S DISEASE WITHOUT DYSKINESIA, WITH FLUCTUATING MANIFESTATIONS (HCC): ICD-10-CM

## 2024-03-20 NOTE — TELEPHONE ENCOUNTER
Received request via: Pharmacy    Medication Name/Dosage carbidopa-levodopa (SINEMET)  MG Tab     When was medication last prescribed 01/3024    How many refills were previously provided 6    How many Refills does he patient have left from last prescription 5    Was the patient seen in the last year in this department? Yes   Date of last office visit 02/27/24     Per last Neurology Office Visit, when was the date of next follow up visit set for?                            Date of office visit follow up request 6-12 months     Does the patient have an upcoming appointment? No   If yes, when N/A             If no, schedule appointment Message sent advising pt to contact us to schedule a f/v    Does the patient have [facility] and need 100 day supply (blood pressure, diabetes and cholesterol meds only)? Patient does not have SCP

## 2024-04-03 ENCOUNTER — OFFICE VISIT (OUTPATIENT)
Dept: CARDIOLOGY | Facility: MEDICAL CENTER | Age: 84
End: 2024-04-03
Attending: INTERNAL MEDICINE
Payer: MEDICARE

## 2024-04-03 VITALS
WEIGHT: 189 LBS | DIASTOLIC BLOOD PRESSURE: 70 MMHG | BODY MASS INDEX: 27.06 KG/M2 | SYSTOLIC BLOOD PRESSURE: 142 MMHG | OXYGEN SATURATION: 97 % | HEART RATE: 73 BPM | RESPIRATION RATE: 16 BRPM | HEIGHT: 70 IN

## 2024-04-03 DIAGNOSIS — I48.0 PAROXYSMAL ATRIAL FIBRILLATION (HCC): ICD-10-CM

## 2024-04-03 DIAGNOSIS — I48.0 PAF (PAROXYSMAL ATRIAL FIBRILLATION) (HCC): ICD-10-CM

## 2024-04-03 PROCEDURE — 99214 OFFICE O/P EST MOD 30 MIN: CPT | Performed by: INTERNAL MEDICINE

## 2024-04-03 PROCEDURE — 3077F SYST BP >= 140 MM HG: CPT | Performed by: INTERNAL MEDICINE

## 2024-04-03 PROCEDURE — 99211 OFF/OP EST MAY X REQ PHY/QHP: CPT | Performed by: INTERNAL MEDICINE

## 2024-04-03 PROCEDURE — 3078F DIAST BP <80 MM HG: CPT | Performed by: INTERNAL MEDICINE

## 2024-04-03 PROCEDURE — 93005 ELECTROCARDIOGRAM TRACING: CPT | Performed by: INTERNAL MEDICINE

## 2024-04-03 RX ORDER — FLECAINIDE ACETATE 150 MG/1
150 TABLET ORAL
Qty: 30 TABLET | Refills: 0 | Status: SHIPPED | OUTPATIENT
Start: 2024-04-03

## 2024-04-03 ASSESSMENT — FIBROSIS 4 INDEX: FIB4 SCORE: 3.14

## 2024-04-03 NOTE — PROGRESS NOTES
Arrhythmia Clinic Note (Established patient)    DOS: 4/3/2024    Chief complaint/Reason for consult: Afib    Interval History: 83-year-old man with paroxysmal atrial fibrillation on as needed flecainide.  Historical refusal of anticoagulation. Has not had any afib the past 6 months.    ROS (+ highlighted in bold):  Constitutional: Fevers/chills/fatigue/weightloss  HEENT: Blurry vision/eye pain/sore throat/hearing loss  Respiratory: Shortness of breath/cough  Cardiovascular: Chest pain/palpitations/edema/orthopnea/syncope  GI: Nausea/vomitting/diarrhea  MSK: Arthralgias/myagias/muscle weakness  Skin: Rash/sores  Neurological: Numbness/tremors/vertigo  Endocrine: Excessive thirst/polyuria/cold intolerance/heat intolerance  Psych: Depression/anxiety    Past Medical History:   Diagnosis Date    Arthritis     bilateral knees    Bronchitis     Cancer ([facility]) 2003    throat Squamous Cell, with chemo/radiation, lymph nodes removal    PAF (paroxysmal atrial fibrillation) ([facility])     denies taking any meds, cardioversion last one was 2017, HX of SVT    Parkinson disease ([facility])     declines to take meds    Pneumonia     SVT (supraventricular tachycardia) ([facility])     denies taking any meds,cardioversion       Past Surgical History:   Procedure Laterality Date    BLEPHAROPLASTY Bilateral 7/29/2019    Procedure: BLEPHAROPLASTY- UPPER;  Surgeon: Jos Baron M.D.;  Location: [facility];  Service: Plastics    BROW LIFT Right 7/29/2019    Procedure: RHYTIDECTOMY, FOREHEAD- DIRECT BROW LIFT;  Surgeon: Jos Baron M.D.;  Location: [facility];  Service: Plastics    INGUINAL HERNIA REPAIR Bilateral 3/31/2017    Procedure: INGUINAL HERNIA REPAIR W/MESH;  Surgeon: Michael Malin M.D.;  Location: SURGERY SAME DAY [facility];  Service:     UMBILICAL HERNIA REPAIR N/A 3/31/2017    Procedure: UMBILICAL HERNIA REPAIR W/MESH;  Surgeon: Michael Malin M.D.;  Location: SURGERY SAME DAY [facility];   "Service:     NECK DISSECTION  2003    HIP ARTHROPLASTY TOTAL Right     Hip Replacement, Total    SHOULDER ARTHROSCOPY W/ ROTATOR CUFF REPAIR Right     ORIF, FRACTURE, FEMUR Left     ORIF, Femur    INGUINAL HERNIA REPAIR Left     OTHER CARDIAC SURGERY      reports has had 6 cardioversions       Social History     Socioeconomic History    Marital status:      Spouse name: Not on file    Number of children: Not on file    Years of education: Not on file    Highest education level: Not on file   Occupational History    Not on file   Tobacco Use    Smoking status: Former     Current packs/day: 0.00     Types: Cigarettes     Start date: 1977     Quit date: 1987     Years since quittin.2    Smokeless tobacco: Never   Vaping Use    Vaping Use: Never used   Substance and Sexual Activity    Alcohol use: Not Currently     Comment: occ    Drug use: No    Sexual activity: Not on file   Other Topics Concern    Not on file   Social History Narrative    Not on file     Social Determinants of Health     Financial Resource Strain: Not on file   Food Insecurity: Not on file   Transportation Needs: Not on file   Physical Activity: Not on file   Stress: Not on file   Social Connections: Not on file   Intimate Partner Violence: Not on file   Housing Stability: Not on file       Family History   Problem Relation Age of Onset    Cancer Mother     Cancer Father     Cancer Brother        Allergies   Allergen Reactions    Cat Hair Extract Shortness of Breath     \"wheezing\"       Current Outpatient Medications   Medication Sig Dispense Refill    carbidopa-levodopa (SINEMET)  MG Tab TAKE 2 TABLETS BY MOUTH THREE TIMES DAILY FOR 90 DAYS 540 Tablet 3    flecainide (TAMBOCOR) 150 MG Tab Take 1 Tablet by mouth 1 time a day as needed (atrial fibrillation). 30 Tablet 0    aspirin 81 MG EC tablet Take 81 mg by mouth every day.       No current facility-administered medications for this visit.       Physical " "Exam:  Vitals:    04/03/24 0934   BP: (!) 142/70   BP Location: Left arm   Patient Position: Sitting   BP Cuff Size: Adult   Pulse: 73   Resp: 16   SpO2: 97%   Weight: 85.7 kg (189 lb)   Height: 1.778 m (5' 10\")     General appearance: NAD, conversant   Eyes: anicteric sclerae, moist conjunctivae; no lid-lag; PERRLA  HENT: Atraumatic; oropharynx clear with moist mucous membranes and no mucosal ulcerations; normal hard and soft palate  Neck: Trachea midline; FROM, supple, no thyromegaly or lymphadenopathy  Lungs: CTA, with normal respiratory effort and no intercostal retractions  CV: RRR, no MRGs, no JVD  Abdomen: Soft, non-tender; no masses or HSM  Extremities: No peripheral edema or extremity lymphadenopathy  Skin: Normal temperature, turgor and texture; no rash, ulcers or subcutaneous nodules  Psych: Appropriate affect, alert and oriented to person, place and time    Data:  Lipids:   Lab Results   Component Value Date/Time    CHOLSTRLTOT 181 04/13/2012 10:34 AM    TRIGLYCERIDE 94 04/13/2012 10:34 AM    HDL 56 04/13/2012 10:34 AM     (H) 04/13/2012 10:34 AM        BMP:  Lab Results   Component Value Date/Time    SODIUM 136 10/27/2023 2305    POTASSIUM 4.1 10/27/2023 2305    CHLORIDE 99 10/27/2023 2305    CO2 26 10/27/2023 2305    GLUCOSE 122 (H) 10/27/2023 2305    BUN 13 10/27/2023 2305    CREATININE 0.93 10/27/2023 2305    CALCIUM 9.3 10/27/2023 2305    ANION 11.0 10/27/2023 2305        TSH:   Lab Results   Component Value Date/Time    TSHULTRASEN 3.750 12/16/2019 2255        THYROXINE (T4):   No results found for: \"FREEDIR\"     CBC:   Lab Results   Component Value Date/Time    WBC 6.7 10/27/2023 11:05 PM    RBC 5.28 10/27/2023 11:05 PM    HEMOGLOBIN 15.1 10/27/2023 11:05 PM    HEMATOCRIT 45.9 10/27/2023 11:05 PM    MCV 86.9 10/27/2023 11:05 PM    MCH 28.6 10/27/2023 11:05 PM    MCHC 32.9 10/27/2023 11:05 PM    RDW 41.4 10/27/2023 11:05 PM    PLATELETCT 207 10/27/2023 11:05 PM    MPV 9.9 10/27/2023 11:05 PM "    NEUTSPOLYS 51.40 10/27/2023 11:05 PM    LYMPHOCYTES 32.80 10/27/2023 11:05 PM    MONOCYTES 7.90 10/27/2023 11:05 PM    EOSINOPHILS 7.20 (H) 10/27/2023 11:05 PM    BASOPHILS 0.60 10/27/2023 11:05 PM    IMMGRAN 0.10 10/27/2023 11:05 PM    NRBC 0.00 10/27/2023 11:05 PM    NEUTS 3.45 10/27/2023 11:05 PM    LYMPHS 2.20 10/27/2023 11:05 PM    MONOS 0.53 10/27/2023 11:05 PM    EOS 0.48 10/27/2023 11:05 PM    BASO 0.04 10/27/2023 11:05 PM    IMMGRANAB 0.01 10/27/2023 11:05 PM    NRBCAB 0.00 10/27/2023 11:05 PM        CBC w/o DIFF  Lab Results   Component Value Date/Time    WBC 6.7 10/27/2023 11:05 PM    RBC 5.28 10/27/2023 11:05 PM    HEMOGLOBIN 15.1 10/27/2023 11:05 PM    MCV 86.9 10/27/2023 11:05 PM    MCH 28.6 10/27/2023 11:05 PM    MCHC 32.9 10/27/2023 11:05 PM    RDW 41.4 10/27/2023 11:05 PM    MPV 9.9 10/27/2023 11:05 PM       Prior echo/stress reviewed: Normal ejection fraction      EKG interpreted by me: Sinus rhythm    Impression/Plan:  1. Paroxysmal atrial fibrillation (HCC)  EKG        1.  Paroxysmal atrial fibrillation  2.  Hypercoagulable state due to atrial fibrillation  3.  High risk medication use    Continue as needed flecainide use, has not needed recently, I will refill this, ECG reviewed    Refuses anticoagulation, CHADS2 is 2 for age, on aspirin    Follow-up later this year to establish with new electrophysiology    Orlando Pickett MD  Cardiac Electrophysiology

## 2024-04-25 LAB — EKG IMPRESSION: NORMAL

## 2024-05-23 ENCOUNTER — APPOINTMENT (OUTPATIENT)
Dept: RADIOLOGY | Facility: MEDICAL CENTER | Age: 84
End: 2024-05-23
Attending: STUDENT IN AN ORGANIZED HEALTH CARE EDUCATION/TRAINING PROGRAM
Payer: MEDICARE

## 2024-05-23 ENCOUNTER — HOSPITAL ENCOUNTER (EMERGENCY)
Facility: MEDICAL CENTER | Age: 84
End: 2024-05-23
Attending: STUDENT IN AN ORGANIZED HEALTH CARE EDUCATION/TRAINING PROGRAM
Payer: MEDICARE

## 2024-05-23 VITALS
WEIGHT: 189.1 LBS | SYSTOLIC BLOOD PRESSURE: 183 MMHG | TEMPERATURE: 97.6 F | RESPIRATION RATE: 20 BRPM | HEART RATE: 72 BPM | HEIGHT: 70 IN | DIASTOLIC BLOOD PRESSURE: 90 MMHG | OXYGEN SATURATION: 98 % | BODY MASS INDEX: 27.07 KG/M2
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
## 2024-05-23 DIAGNOSIS — S37.091A: ICD-10-CM

## 2024-05-23 DIAGNOSIS — S81.811A SKIN TEAR OF RIGHT LOWER LEG WITHOUT COMPLICATION, INITIAL ENCOUNTER: ICD-10-CM

## 2024-05-23 DIAGNOSIS — S51.011A SKIN TEAR OF RIGHT ELBOW WITHOUT COMPLICATION, INITIAL ENCOUNTER: ICD-10-CM

## 2024-05-23 LAB
ALBUMIN SERPL BCP-MCNC: 4.3 G/DL (ref 3.2–4.9)
ALBUMIN/GLOB SERPL: 1.5 G/DL
ALP SERPL-CCNC: 75 U/L (ref 30–99)
ALT SERPL-CCNC: 10 U/L (ref 2–50)
ANION GAP SERPL CALC-SCNC: 10 MMOL/L (ref 7–16)
APPEARANCE UR: CLEAR
APTT PPP: 26.1 SEC (ref 24.7–36)
AST SERPL-CCNC: 18 U/L (ref 12–45)
BACTERIA #/AREA URNS HPF: NEGATIVE /HPF
BASOPHILS # BLD AUTO: 0.2 % (ref 0–1.8)
BASOPHILS # BLD: 0.03 K/UL (ref 0–0.12)
BILIRUB SERPL-MCNC: 0.5 MG/DL (ref 0.1–1.5)
BILIRUB UR QL STRIP.AUTO: NEGATIVE
BUN SERPL-MCNC: 14 MG/DL (ref 8–22)
CALCIUM ALBUM COR SERPL-MCNC: 9.4 MG/DL (ref 8.5–10.5)
CALCIUM SERPL-MCNC: 9.6 MG/DL (ref 8.4–10.2)
CHLORIDE SERPL-SCNC: 102 MMOL/L (ref 96–112)
CO2 SERPL-SCNC: 26 MMOL/L (ref 20–33)
COLOR UR: YELLOW
CREAT SERPL-MCNC: 0.95 MG/DL (ref 0.5–1.4)
EOSINOPHIL # BLD AUTO: 0.12 K/UL (ref 0–0.51)
EOSINOPHIL NFR BLD: 0.9 % (ref 0–6.9)
EPI CELLS #/AREA URNS HPF: ABNORMAL /HPF
ERYTHROCYTE [DISTWIDTH] IN BLOOD BY AUTOMATED COUNT: 42.5 FL (ref 35.9–50)
GFR SERPLBLD CREATININE-BSD FMLA CKD-EPI: 80 ML/MIN/1.73 M 2
GLOBULIN SER CALC-MCNC: 2.8 G/DL (ref 1.9–3.5)
GLUCOSE SERPL-MCNC: 140 MG/DL (ref 65–99)
GLUCOSE UR STRIP.AUTO-MCNC: NEGATIVE MG/DL
HCT VFR BLD AUTO: 45.2 % (ref 42–52)
HGB BLD-MCNC: 14.8 G/DL (ref 14–18)
IMM GRANULOCYTES # BLD AUTO: 0.02 K/UL (ref 0–0.11)
IMM GRANULOCYTES NFR BLD AUTO: 0.2 % (ref 0–0.9)
INR PPP: 0.96 (ref 0.87–1.13)
KETONES UR STRIP.AUTO-MCNC: NEGATIVE MG/DL
LEUKOCYTE ESTERASE UR QL STRIP.AUTO: NEGATIVE
LIPASE SERPL-CCNC: 48 U/L (ref 11–82)
LYMPHOCYTES # BLD AUTO: 1.08 K/UL (ref 1–4.8)
LYMPHOCYTES NFR BLD: 8.5 % (ref 22–41)
MCH RBC QN AUTO: 28.7 PG (ref 27–33)
MCHC RBC AUTO-ENTMCNC: 32.7 G/DL (ref 32.3–36.5)
MCV RBC AUTO: 87.6 FL (ref 81.4–97.8)
MICRO URNS: ABNORMAL
MONOCYTES # BLD AUTO: 0.65 K/UL (ref 0–0.85)
MONOCYTES NFR BLD AUTO: 5.1 % (ref 0–13.4)
NEUTROPHILS # BLD AUTO: 10.83 K/UL (ref 1.82–7.42)
NEUTROPHILS NFR BLD: 85.1 % (ref 44–72)
NITRITE UR QL STRIP.AUTO: NEGATIVE
NRBC # BLD AUTO: 0 K/UL
NRBC BLD-RTO: 0 /100 WBC (ref 0–0.2)
PH UR STRIP.AUTO: 7.5 [PH] (ref 5–8)
PLATELET # BLD AUTO: 206 K/UL (ref 164–446)
PMV BLD AUTO: 10.2 FL (ref 9–12.9)
POTASSIUM SERPL-SCNC: 5.2 MMOL/L (ref 3.6–5.5)
PROT SERPL-MCNC: 7.1 G/DL (ref 6–8.2)
PROT UR QL STRIP: NEGATIVE MG/DL
PROTHROMBIN TIME: 13.2 SEC (ref 12–14.6)
RBC # BLD AUTO: 5.16 M/UL (ref 4.7–6.1)
RBC UR QL AUTO: ABNORMAL
SODIUM SERPL-SCNC: 138 MMOL/L (ref 135–145)
SP GR UR STRIP.AUTO: 1.01
WBC # BLD AUTO: 12.7 K/UL (ref 4.8–10.8)
WBC #/AREA URNS HPF: ABNORMAL /HPF

## 2024-05-23 PROCEDURE — 72128 CT CHEST SPINE W/O DYE: CPT

## 2024-05-23 PROCEDURE — 80053 COMPREHEN METABOLIC PANEL: CPT

## 2024-05-23 PROCEDURE — 85025 COMPLETE CBC W/AUTO DIFF WBC: CPT

## 2024-05-23 PROCEDURE — 86850 RBC ANTIBODY SCREEN: CPT

## 2024-05-23 PROCEDURE — 85730 THROMBOPLASTIN TIME PARTIAL: CPT

## 2024-05-23 PROCEDURE — 73070 X-RAY EXAM OF ELBOW: CPT | Mod: RT

## 2024-05-23 PROCEDURE — 70450 CT HEAD/BRAIN W/O DYE: CPT

## 2024-05-23 PROCEDURE — 700111 HCHG RX REV CODE 636 W/ 250 OVERRIDE (IP): Mod: JZ | Performed by: STUDENT IN AN ORGANIZED HEALTH CARE EDUCATION/TRAINING PROGRAM

## 2024-05-23 PROCEDURE — 86901 BLOOD TYPING SEROLOGIC RH(D): CPT

## 2024-05-23 PROCEDURE — 96375 TX/PRO/DX INJ NEW DRUG ADDON: CPT | Mod: XU

## 2024-05-23 PROCEDURE — 36415 COLL VENOUS BLD VENIPUNCTURE: CPT

## 2024-05-23 PROCEDURE — 81001 URINALYSIS AUTO W/SCOPE: CPT

## 2024-05-23 PROCEDURE — 83690 ASSAY OF LIPASE: CPT

## 2024-05-23 PROCEDURE — 72125 CT NECK SPINE W/O DYE: CPT

## 2024-05-23 PROCEDURE — 72131 CT LUMBAR SPINE W/O DYE: CPT

## 2024-05-23 PROCEDURE — 99285 EMERGENCY DEPT VISIT HI MDM: CPT

## 2024-05-23 PROCEDURE — 96374 THER/PROPH/DIAG INJ IV PUSH: CPT | Mod: XU

## 2024-05-23 PROCEDURE — 85610 PROTHROMBIN TIME: CPT

## 2024-05-23 PROCEDURE — 86900 BLOOD TYPING SEROLOGIC ABO: CPT

## 2024-05-23 PROCEDURE — 700117 HCHG RX CONTRAST REV CODE 255: Performed by: STUDENT IN AN ORGANIZED HEALTH CARE EDUCATION/TRAINING PROGRAM

## 2024-05-23 PROCEDURE — 71260 CT THORAX DX C+: CPT

## 2024-05-23 PROCEDURE — 96376 TX/PRO/DX INJ SAME DRUG ADON: CPT | Mod: XU

## 2024-05-23 RX ORDER — ONDANSETRON 2 MG/ML
4 INJECTION INTRAMUSCULAR; INTRAVENOUS ONCE
Status: COMPLETED | OUTPATIENT
Start: 2024-05-23 | End: 2024-05-23

## 2024-05-23 RX ADMIN — IOHEXOL 100 ML: 350 INJECTION, SOLUTION INTRAVENOUS at 22:25

## 2024-05-23 RX ADMIN — ONDANSETRON 4 MG: 2 INJECTION INTRAMUSCULAR; INTRAVENOUS at 21:35

## 2024-05-23 RX ADMIN — FENTANYL CITRATE 100 MCG: 50 INJECTION, SOLUTION INTRAMUSCULAR; INTRAVENOUS at 23:25

## 2024-05-23 RX ADMIN — FENTANYL CITRATE 100 MCG: 50 INJECTION, SOLUTION INTRAMUSCULAR; INTRAVENOUS at 21:36

## 2024-05-23 ASSESSMENT — PAIN DESCRIPTION - PAIN TYPE: TYPE: ACUTE PAIN

## 2024-05-23 ASSESSMENT — FIBROSIS 4 INDEX: FIB4 SCORE: 3.14

## 2024-05-24 ENCOUNTER — HOSPITAL ENCOUNTER (INPATIENT)
Facility: MEDICAL CENTER | Age: 84
LOS: 1 days | DRG: 700 | End: 2024-05-25
Attending: EMERGENCY MEDICINE | Admitting: SURGERY
Payer: MEDICARE

## 2024-05-24 DIAGNOSIS — E53.8 VITAMIN B12 DEFICIENCY: ICD-10-CM

## 2024-05-24 DIAGNOSIS — T07.XXXA MULTIPLE ABRASIONS: ICD-10-CM

## 2024-05-24 DIAGNOSIS — G20.B1 PARKINSON'S DISEASE WITH DYSKINESIA, UNSPECIFIED WHETHER MANIFESTATIONS FLUCTUATE (HCC): ICD-10-CM
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
## 2024-05-24 DIAGNOSIS — T14.8XXA MULTIPLE SKIN TEARS: ICD-10-CM

## 2024-05-24 DIAGNOSIS — S37.031A LACERATION OF RIGHT KIDNEY, INITIAL ENCOUNTER: ICD-10-CM

## 2024-05-24 PROBLEM — Z79.82 ASPIRIN LONG-TERM USE: Status: ACTIVE | Noted: 2024-05-24

## 2024-05-24 PROBLEM — D72.829 LEUKOCYTOSIS: Status: ACTIVE | Noted: 2024-05-24

## 2024-05-24 PROBLEM — Z53.09 CONTRAINDICATION TO DEEP VEIN THROMBOSIS (DVT) PROPHYLAXIS: Status: ACTIVE | Noted: 2024-05-24

## 2024-05-24 PROBLEM — T14.90XA TRAUMA: Status: ACTIVE | Noted: 2024-05-24

## 2024-05-24 PROBLEM — Z85.818 HISTORY OF CANCER TONSIL: Status: ACTIVE | Noted: 2024-05-24

## 2024-05-24 PROBLEM — R03.0 ELEVATED BP WITHOUT DIAGNOSIS OF HYPERTENSION: Status: ACTIVE | Noted: 2024-05-24

## 2024-05-24 PROBLEM — S37.001A: Status: ACTIVE | Noted: 2024-05-24

## 2024-05-24 PROBLEM — R73.9 HYPERGLYCEMIA: Status: ACTIVE | Noted: 2024-05-24

## 2024-05-24 PROBLEM — Z01.89 ENCOUNTER FOR GERIATRIC ASSESSMENT: Status: ACTIVE | Noted: 2024-05-24

## 2024-05-24 LAB
ABO GROUP BLD: NORMAL
ABO GROUP BLD: NORMAL
ALBUMIN SERPL BCP-MCNC: 4.3 G/DL (ref 3.2–4.9)
ALBUMIN/GLOB SERPL: 1.5 G/DL
ALP SERPL-CCNC: 72 U/L (ref 30–99)
ALT SERPL-CCNC: 12 U/L (ref 2–50)
ANION GAP SERPL CALC-SCNC: 11 MMOL/L (ref 7–16)
ANION GAP SERPL CALC-SCNC: 12 MMOL/L (ref 7–16)
APTT PPP: 27.3 SEC (ref 24.7–36)
AST SERPL-CCNC: 20 U/L (ref 12–45)
BILIRUB SERPL-MCNC: 0.7 MG/DL (ref 0.1–1.5)
BLD GP AB SCN SERPL QL: NORMAL
BLD GP AB SCN SERPL QL: NORMAL
BUN SERPL-MCNC: 15 MG/DL (ref 8–22)
BUN SERPL-MCNC: 15 MG/DL (ref 8–22)
CALCIUM ALBUM COR SERPL-MCNC: 9.2 MG/DL (ref 8.5–10.5)
CALCIUM SERPL-MCNC: 9 MG/DL (ref 8.5–10.5)
CALCIUM SERPL-MCNC: 9.4 MG/DL (ref 8.5–10.5)
CFT BLD TEG: 4.5 MIN (ref 4.6–9.1)
CFT P HPASE BLD TEG: 5.1 MIN (ref 4.3–8.3)
CHLORIDE SERPL-SCNC: 100 MMOL/L (ref 96–112)
CHLORIDE SERPL-SCNC: 100 MMOL/L (ref 96–112)
CLOT ANGLE BLD TEG: 73.8 DEGREES (ref 63–78)
CLOT LYSIS 30M P MA LENFR BLD TEG: 1 % (ref 0–2.6)
CO2 SERPL-SCNC: 23 MMOL/L (ref 20–33)
CO2 SERPL-SCNC: 25 MMOL/L (ref 20–33)
CREAT SERPL-MCNC: 0.91 MG/DL (ref 0.5–1.4)
CREAT SERPL-MCNC: 1.05 MG/DL (ref 0.5–1.4)
CT.EXTRINSIC BLD ROTEM: 1.3 MIN (ref 0.8–2.1)
ERYTHROCYTE [DISTWIDTH] IN BLOOD BY AUTOMATED COUNT: 42.5 FL (ref 35.9–50)
ETHANOL BLD-MCNC: <10.1 MG/DL
GFR SERPLBLD CREATININE-BSD FMLA CKD-EPI: 71 ML/MIN/1.73 M 2
GFR SERPLBLD CREATININE-BSD FMLA CKD-EPI: 84 ML/MIN/1.73 M 2
GLOBULIN SER CALC-MCNC: 2.8 G/DL (ref 1.9–3.5)
GLUCOSE SERPL-MCNC: 130 MG/DL (ref 65–99)
GLUCOSE SERPL-MCNC: 155 MG/DL (ref 65–99)
HCT VFR BLD AUTO: 43.4 % (ref 42–52)
HCT VFR BLD AUTO: 45.4 % (ref 42–52)
HGB BLD-MCNC: 13.5 G/DL (ref 14–18)
HGB BLD-MCNC: 13.9 G/DL (ref 14–18)
HGB BLD-MCNC: 14.6 G/DL (ref 14–18)
HGB BLD-MCNC: 14.7 G/DL (ref 14–18)
HGB BLD-MCNC: 14.9 G/DL (ref 14–18)
INR PPP: 1.06 (ref 0.87–1.13)
MCF BLD TEG: 58.5 MM (ref 52–69)
MCF.PLATELET INHIB BLD ROTEM: 18 MM (ref 15–32)
MCH RBC QN AUTO: 28.3 PG (ref 27–33)
MCHC RBC AUTO-ENTMCNC: 32.4 G/DL (ref 32.3–36.5)
MCV RBC AUTO: 87.5 FL (ref 81.4–97.8)
PA AA BLD-ACNC: 23.5 % (ref 0–11)
PA ADP BLD-ACNC: 26 % (ref 0–17)
PLATELET # BLD AUTO: 213 K/UL (ref 164–446)
PMV BLD AUTO: 10.6 FL (ref 9–12.9)
POTASSIUM SERPL-SCNC: 5.1 MMOL/L (ref 3.6–5.5)
POTASSIUM SERPL-SCNC: 5.3 MMOL/L (ref 3.6–5.5)
PROT SERPL-MCNC: 7.1 G/DL (ref 6–8.2)
PROTHROMBIN TIME: 14 SEC (ref 12–14.6)
RBC # BLD AUTO: 5.19 M/UL (ref 4.7–6.1)
RH BLD: NORMAL
RH BLD: NORMAL
SODIUM SERPL-SCNC: 135 MMOL/L (ref 135–145)
SODIUM SERPL-SCNC: 136 MMOL/L (ref 135–145)
TEG ALGORITHM TGALG: ABNORMAL
WBC # BLD AUTO: 13.1 K/UL (ref 4.8–10.8)

## 2024-05-24 PROCEDURE — 700101 HCHG RX REV CODE 250

## 2024-05-24 PROCEDURE — 80053 COMPREHEN METABOLIC PANEL: CPT

## 2024-05-24 PROCEDURE — 700102 HCHG RX REV CODE 250 W/ 637 OVERRIDE(OP)

## 2024-05-24 PROCEDURE — 85014 HEMATOCRIT: CPT

## 2024-05-24 PROCEDURE — 85018 HEMOGLOBIN: CPT

## 2024-05-24 PROCEDURE — 700102 HCHG RX REV CODE 250 W/ 637 OVERRIDE(OP): Performed by: EMERGENCY MEDICINE

## 2024-05-24 PROCEDURE — 85576 BLOOD PLATELET AGGREGATION: CPT | Mod: 91

## 2024-05-24 PROCEDURE — 86901 BLOOD TYPING SEROLOGIC RH(D): CPT

## 2024-05-24 PROCEDURE — 80048 BASIC METABOLIC PNL TOTAL CA: CPT

## 2024-05-24 PROCEDURE — 36415 COLL VENOUS BLD VENIPUNCTURE: CPT

## 2024-05-24 PROCEDURE — A9270 NON-COVERED ITEM OR SERVICE: HCPCS

## 2024-05-24 PROCEDURE — 85347 COAGULATION TIME ACTIVATED: CPT

## 2024-05-24 PROCEDURE — 99222 1ST HOSP IP/OBS MODERATE 55: CPT | Performed by: FAMILY MEDICINE

## 2024-05-24 PROCEDURE — 82077 ASSAY SPEC XCP UR&BREATH IA: CPT

## 2024-05-24 PROCEDURE — 99223 1ST HOSP IP/OBS HIGH 75: CPT | Performed by: SURGERY

## 2024-05-24 PROCEDURE — 85730 THROMBOPLASTIN TIME PARTIAL: CPT

## 2024-05-24 PROCEDURE — A9270 NON-COVERED ITEM OR SERVICE: HCPCS | Performed by: EMERGENCY MEDICINE

## 2024-05-24 PROCEDURE — 700105 HCHG RX REV CODE 258

## 2024-05-24 PROCEDURE — 86900 BLOOD TYPING SEROLOGIC ABO: CPT

## 2024-05-24 PROCEDURE — 305950 HCHG YELLOW TRAUMA ACT PRE-NOTIFY NO CC

## 2024-05-24 PROCEDURE — 85027 COMPLETE CBC AUTOMATED: CPT

## 2024-05-24 PROCEDURE — 770001 HCHG ROOM/CARE - MED/SURG/GYN PRIV*

## 2024-05-24 PROCEDURE — 86850 RBC ANTIBODY SCREEN: CPT

## 2024-05-24 PROCEDURE — 99285 EMERGENCY DEPT VISIT HI MDM: CPT

## 2024-05-24 PROCEDURE — 85384 FIBRINOGEN ACTIVITY: CPT | Mod: 91

## 2024-05-24 PROCEDURE — 85610 PROTHROMBIN TIME: CPT

## 2024-05-24 RX ORDER — ACETAMINOPHEN 500 MG
1000 TABLET ORAL EVERY 6 HOURS
Status: DISCONTINUED | OUTPATIENT
Start: 2024-05-24 | End: 2024-05-25 | Stop reason: HOSPADM

## 2024-05-24 RX ORDER — SODIUM CHLORIDE, SODIUM LACTATE, POTASSIUM CHLORIDE, CALCIUM CHLORIDE 600; 310; 30; 20 MG/100ML; MG/100ML; MG/100ML; MG/100ML
INJECTION, SOLUTION INTRAVENOUS CONTINUOUS
Status: DISCONTINUED | OUTPATIENT
Start: 2024-05-24 | End: 2024-05-25

## 2024-05-24 RX ORDER — HYDROMORPHONE HYDROCHLORIDE 1 MG/ML
0.5 INJECTION, SOLUTION INTRAMUSCULAR; INTRAVENOUS; SUBCUTANEOUS
Status: DISCONTINUED | OUTPATIENT
Start: 2024-05-24 | End: 2024-05-25

## 2024-05-24 RX ORDER — ACETAMINOPHEN 500 MG
1000 TABLET ORAL EVERY 6 HOURS PRN
Status: DISCONTINUED | OUTPATIENT
Start: 2024-05-29 | End: 2024-05-25 | Stop reason: HOSPADM

## 2024-05-24 RX ORDER — ONDANSETRON 4 MG/1
4 TABLET, ORALLY DISINTEGRATING ORAL EVERY 4 HOURS PRN
Status: DISCONTINUED | OUTPATIENT
Start: 2024-05-24 | End: 2024-05-25 | Stop reason: HOSPADM

## 2024-05-24 RX ORDER — LABETALOL HYDROCHLORIDE 5 MG/ML
10 INJECTION, SOLUTION INTRAVENOUS EVERY 4 HOURS PRN
Status: DISCONTINUED | OUTPATIENT
Start: 2024-05-24 | End: 2024-05-25 | Stop reason: HOSPADM

## 2024-05-24 RX ORDER — OXYCODONE HYDROCHLORIDE 10 MG/1
10 TABLET ORAL
Status: DISCONTINUED | OUTPATIENT
Start: 2024-05-24 | End: 2024-05-25

## 2024-05-24 RX ORDER — BISACODYL 10 MG
10 SUPPOSITORY, RECTAL RECTAL
Status: DISCONTINUED | OUTPATIENT
Start: 2024-05-24 | End: 2024-05-25 | Stop reason: HOSPADM

## 2024-05-24 RX ORDER — ONDANSETRON 2 MG/ML
4 INJECTION INTRAMUSCULAR; INTRAVENOUS EVERY 4 HOURS PRN
Status: DISCONTINUED | OUTPATIENT
Start: 2024-05-24 | End: 2024-05-25 | Stop reason: HOSPADM

## 2024-05-24 RX ORDER — BACITRACIN ZINC AND POLYMYXIN B SULFATE 500; 1000 [USP'U]/G; [USP'U]/G
OINTMENT TOPICAL 3 TIMES DAILY
Status: DISCONTINUED | OUTPATIENT
Start: 2024-05-24 | End: 2024-05-25 | Stop reason: HOSPADM

## 2024-05-24 RX ORDER — ENEMA 19; 7 G/133ML; G/133ML
1 ENEMA RECTAL
Status: DISCONTINUED | OUTPATIENT
Start: 2024-05-24 | End: 2024-05-25 | Stop reason: HOSPADM

## 2024-05-24 RX ORDER — AMOXICILLIN 250 MG
1 CAPSULE ORAL NIGHTLY
Status: DISCONTINUED | OUTPATIENT
Start: 2024-05-24 | End: 2024-05-25 | Stop reason: HOSPADM

## 2024-05-24 RX ORDER — GABAPENTIN 100 MG/1
100 CAPSULE ORAL EVERY 8 HOURS
Status: DISCONTINUED | OUTPATIENT
Start: 2024-05-24 | End: 2024-05-25 | Stop reason: HOSPADM

## 2024-05-24 RX ORDER — LIDOCAINE 4 G/G
1 PATCH TOPICAL EVERY 24 HOURS
Status: DISCONTINUED | OUTPATIENT
Start: 2024-05-24 | End: 2024-05-25 | Stop reason: HOSPADM

## 2024-05-24 RX ORDER — AMOXICILLIN 250 MG
1 CAPSULE ORAL
Status: DISCONTINUED | OUTPATIENT
Start: 2024-05-24 | End: 2024-05-25 | Stop reason: HOSPADM

## 2024-05-24 RX ORDER — OXYCODONE HYDROCHLORIDE 5 MG/1
5 TABLET ORAL
Status: DISCONTINUED | OUTPATIENT
Start: 2024-05-24 | End: 2024-05-25

## 2024-05-24 RX ORDER — METAXALONE 800 MG/1
800 TABLET ORAL EVERY 8 HOURS
Status: DISCONTINUED | OUTPATIENT
Start: 2024-05-24 | End: 2024-05-25 | Stop reason: HOSPADM

## 2024-05-24 RX ORDER — POLYETHYLENE GLYCOL 3350 17 G/17G
1 POWDER, FOR SOLUTION ORAL 2 TIMES DAILY
Status: DISCONTINUED | OUTPATIENT
Start: 2024-05-24 | End: 2024-05-25 | Stop reason: HOSPADM

## 2024-05-24 RX ORDER — DOCUSATE SODIUM 100 MG/1
100 CAPSULE, LIQUID FILLED ORAL 2 TIMES DAILY
Status: DISCONTINUED | OUTPATIENT
Start: 2024-05-24 | End: 2024-05-25 | Stop reason: HOSPADM

## 2024-05-24 RX ADMIN — CARBIDOPA AND LEVODOPA 2 TABLET: 25; 100 TABLET ORAL at 13:46

## 2024-05-24 RX ADMIN — LIDOCAINE 1 PATCH: 4 PATCH TOPICAL at 00:41

## 2024-05-24 RX ADMIN — ACETAMINOPHEN 1000 MG: 500 TABLET, FILM COATED ORAL at 00:37

## 2024-05-24 RX ADMIN — ACETAMINOPHEN 1000 MG: 500 TABLET, FILM COATED ORAL at 12:05

## 2024-05-24 RX ADMIN — SODIUM CHLORIDE, POTASSIUM CHLORIDE, SODIUM LACTATE AND CALCIUM CHLORIDE: 600; 310; 30; 20 INJECTION, SOLUTION INTRAVENOUS at 00:40

## 2024-05-24 RX ADMIN — CARBIDOPA AND LEVODOPA 2 TABLET: 25; 100 TABLET ORAL at 07:00

## 2024-05-24 RX ADMIN — CARBIDOPA AND LEVODOPA 1 TABLET: 25; 100 TABLET ORAL at 00:38

## 2024-05-24 RX ADMIN — CARBIDOPA AND LEVODOPA 2 TABLET: 25; 100 TABLET ORAL at 20:01

## 2024-05-24 SDOH — ECONOMIC STABILITY: TRANSPORTATION INSECURITY
IN THE PAST 12 MONTHS, HAS THE LACK OF TRANSPORTATION KEPT YOU FROM MEDICAL APPOINTMENTS OR FROM GETTING MEDICATIONS?: NO

## 2024-05-24 SDOH — ECONOMIC STABILITY: TRANSPORTATION INSECURITY
IN THE PAST 12 MONTHS, HAS LACK OF RELIABLE TRANSPORTATION KEPT YOU FROM MEDICAL APPOINTMENTS, MEETINGS, WORK OR FROM GETTING THINGS NEEDED FOR DAILY LIVING?: NO

## 2024-05-24 ASSESSMENT — ENCOUNTER SYMPTOMS
NECK PAIN: 0
WHEEZING: 0
BACK PAIN: 0
PALPITATIONS: 0
NAUSEA: 0
DIARRHEA: 0
HEARTBURN: 0
FLANK PAIN: 0
NEUROLOGICAL NEGATIVE: 1
SENSORY CHANGE: 0
VOMITING: 0
PHOTOPHOBIA: 0
FEVER: 0
ABDOMINAL PAIN: 0
CHILLS: 0
MYALGIAS: 0
SORE THROAT: 0
HEADACHES: 0
COUGH: 0
FOCAL WEAKNESS: 0
DIZZINESS: 0
NERVOUS/ANXIOUS: 0
BLURRED VISION: 0
WEAKNESS: 0
EYE PAIN: 0
SHORTNESS OF BREATH: 0
DIAPHORESIS: 0
TREMORS: 1
SPEECH CHANGE: 0
DOUBLE VISION: 0

## 2024-05-24 ASSESSMENT — SOCIAL DETERMINANTS OF HEALTH (SDOH)
IN THE PAST 12 MONTHS, HAS THE ELECTRIC, GAS, OIL, OR WATER COMPANY THREATENED TO SHUT OFF SERVICE IN YOUR HOME?: NO
WITHIN THE PAST 12 MONTHS, THE FOOD YOU BOUGHT JUST DIDN'T LAST AND YOU DIDN'T HAVE MONEY TO GET MORE: NEVER TRUE
WITHIN THE LAST YEAR, HAVE TO BEEN RAPED OR FORCED TO HAVE ANY KIND OF SEXUAL ACTIVITY BY YOUR PARTNER OR EX-PARTNER?: NO
WITHIN THE LAST YEAR, HAVE YOU BEEN AFRAID OF YOUR PARTNER OR EX-PARTNER?: NO
WITHIN THE PAST 12 MONTHS, YOU WORRIED THAT YOUR FOOD WOULD RUN OUT BEFORE YOU GOT THE MONEY TO BUY MORE: NEVER TRUE
WITHIN THE LAST YEAR, HAVE YOU BEEN HUMILIATED OR EMOTIONALLY ABUSED IN OTHER WAYS BY YOUR PARTNER OR EX-PARTNER?: NO
WITHIN THE LAST YEAR, HAVE YOU BEEN KICKED, HIT, SLAPPED, OR OTHERWISE PHYSICALLY HURT BY YOUR PARTNER OR EX-PARTNER?: NO

## 2024-05-24 ASSESSMENT — LIFESTYLE VARIABLES
HAVE YOU EVER FELT YOU SHOULD CUT DOWN ON YOUR DRINKING: NO
CONSUMPTION TOTAL: NEGATIVE
TOTAL SCORE: 0
EVER HAD A DRINK FIRST THING IN THE MORNING TO STEADY YOUR NERVES TO GET RID OF A HANGOVER: NO
ON A TYPICAL DAY WHEN YOU DRINK ALCOHOL HOW MANY DRINKS DO YOU HAVE: 2
EVER FELT BAD OR GUILTY ABOUT YOUR DRINKING: NO
TOTAL SCORE: 0
ALCOHOL_USE: YES
TOTAL SCORE: 0
HOW MANY TIMES IN THE PAST YEAR HAVE YOU HAD 5 OR MORE DRINKS IN A DAY: 0
AVERAGE NUMBER OF DAYS PER WEEK YOU HAVE A DRINK CONTAINING ALCOHOL: 1
HAVE PEOPLE ANNOYED YOU BY CRITICIZING YOUR DRINKING: NO

## 2024-05-24 ASSESSMENT — COPD QUESTIONNAIRES
DO YOU EVER COUGH UP ANY MUCUS OR PHLEGM?: NO/ONLY WITH OCCASIONAL COLDS OR INFECTIONS
HAVE YOU SMOKED AT LEAST 100 CIGARETTES IN YOUR ENTIRE LIFE: NO/DON'T KNOW
COPD SCREENING SCORE: 2
DURING THE PAST 4 WEEKS HOW MUCH DID YOU FEEL SHORT OF BREATH: NONE/LITTLE OF THE TIME

## 2024-05-24 ASSESSMENT — PATIENT HEALTH QUESTIONNAIRE - PHQ9
1. LITTLE INTEREST OR PLEASURE IN DOING THINGS: NOT AT ALL
SUM OF ALL RESPONSES TO PHQ9 QUESTIONS 1 AND 2: 0
2. FEELING DOWN, DEPRESSED, IRRITABLE, OR HOPELESS: NOT AT ALL

## 2024-05-24 ASSESSMENT — FIBROSIS 4 INDEX: FIB4 SCORE: 2.24

## 2024-05-24 ASSESSMENT — PAIN DESCRIPTION - PAIN TYPE
TYPE: ACUTE PAIN
TYPE: ACUTE PAIN

## 2024-05-24 NOTE — ASSESSMENT & PLAN NOTE
Mechanical ground level fall.  Trauma Yellow Transfer Activation from [facility] in [place], [place].  Jeannie Claudio MD. Trauma Surgery.

## 2024-05-24 NOTE — DISCHARGE PLANNING
Trauma Response    Referral: Trauma yellow Response    Intervention: SW responded to trauma yellow.  Pt was [facility] after [facility].  Pt was alert upon arrival.  Pts name is Hamlet Del Cid (: 1940).  ANA obtained the following pt information: Per MINE the pt wife is aware of the pt being brought to [facility] but she is going home and will be here in the morning.      Carmen Del Cid (wife) [phone]    Plan: SW will remain available for pt support.

## 2024-05-24 NOTE — ED PROVIDER NOTES
CHIEF COMPLAINT  Chief Complaint   Patient presents with    T-5000 GLF     Patient report he tripped over a garden hose approximately 5:15 pm today and fell on his right side of his body.  Patient report of right elbow, right knee and right side pain.  Denies any blood in his urine.  Slightly nauseated and lightheaded.  Patient denies hitting his head.  And patient is not taking any blood thinners.     Elbow Pain    Knee Pain    Pain       LIMITATION TO HISTORY   Select: None    HPI    Hamlet Del Cid is a 83 y.o. male who presents to the Emergency Department presents for evaluation of right lower quadrant abdominal pain which has started after he tripped over a garden has.  Patient stated that he was outside when he tripped over a garden hose landing on his right elbow right shin and did strike his right lower abdomen on the ground.  He denies any head strike, LOC no midline neck or back pain, stated that the pain is isolated to his right lower quadrant of his abdomen.    OUTSIDE HISTORIAN(S):  Select: Wife states that the patient does have a history of Parkinson's    EXTERNAL RECORDS REVIEWED  Select: Other external records reviewed patient does have a history of Parkinson's disease, there is also a noted history of atrial fibrillation though the patient refuses anticoagulation, does take flecainide and has not had any A-fib in the past 6 months as of April 3      PAST MEDICAL HISTORY  Past Medical History:   Diagnosis Date    Arthritis     bilateral knees    Bronchitis     Cancer ([facility]) 2003    throat Squamous Cell, with chemo/radiation, lymph nodes removal    PAF (paroxysmal atrial fibrillation) ([facility])     denies taking any meds, cardioversion last one was 2017, HX of SVT    Parkinson disease ([facility])     declines to take meds    Pneumonia     SVT (supraventricular tachycardia) ([facility])     denies taking any meds,cardioversion     .    SURGICAL HISTORY  Past Surgical History:   Procedure Laterality Date     BLEPHAROPLASTY Bilateral 2019    Procedure: BLEPHAROPLASTY- UPPER;  Surgeon: Jos Baron M.D.;  Location: SURGERY [facility];  Service: Plastics    BROW LIFT Right 2019    Procedure: RHYTIDECTOMY, FOREHEAD- DIRECT BROW LIFT;  Surgeon: Jos Baron M.D.;  Location: SURGERY [facility];  Service: Plastics    INGUINAL HERNIA REPAIR Bilateral 3/31/2017    Procedure: INGUINAL HERNIA REPAIR W/MESH;  Surgeon: Michael Malin M.D.;  Location: SURGERY SAME DAY [facility];  Service:     UMBILICAL HERNIA REPAIR N/A 3/31/2017    Procedure: UMBILICAL HERNIA REPAIR W/MESH;  Surgeon: Michael Malin M.D.;  Location: SURGERY SAME DAY [facility];  Service:     NECK DISSECTION  2003    HIP ARTHROPLASTY TOTAL Right     Hip Replacement, Total    SHOULDER ARTHROSCOPY W/ ROTATOR CUFF REPAIR Right     ORIF, FRACTURE, FEMUR Left     ORIF, Femur    INGUINAL HERNIA REPAIR Left     OTHER CARDIAC SURGERY      reports has had 6 cardioversions         FAMILY HISTORY  Family History   Problem Relation Age of Onset    Cancer Mother     Cancer Father     Cancer Brother           SOCIAL HISTORY  Social History     Socioeconomic History    Marital status:      Spouse name: Not on file    Number of children: Not on file    Years of education: Not on file    Highest education level: Not on file   Occupational History    Not on file   Tobacco Use    Smoking status: Former     Current packs/day: 0.00     Types: Cigarettes     Start date: 1977     Quit date: 1987     Years since quittin.4    Smokeless tobacco: Never   Vaping Use    Vaping status: Never Used   Substance and Sexual Activity    Alcohol use: Not Currently     Comment: occ    Drug use: No    Sexual activity: Not on file   Other Topics Concern    Not on file   Social History Narrative    Not on file     Social Determinants of Health     Financial Resource Strain: Not on file   Food Insecurity: Not on file   Transportation  "Needs: Not on file   Physical Activity: Not on file   Stress: Not on file   Social Connections: Not on file   Intimate Partner Violence: Not on file   Housing Stability: Not on file         CURRENT MEDICATIONS  No current facility-administered medications on file prior to encounter.     Current Outpatient Medications on File Prior to Encounter   Medication Sig Dispense Refill    carbidopa-levodopa (SINEMET)  MG Tab TAKE 2 TABLETS BY MOUTH THREE TIMES DAILY FOR 90 DAYS 540 Tablet 3    flecainide (TAMBOCOR) 150 MG Tab Take 1 Tablet by mouth 1 time a day as needed (atrial fibrillation). 30 Tablet 0    aspirin 81 MG EC tablet Take 81 mg by mouth every day.             ALLERGIES  Allergies   Allergen Reactions    Cat Hair Extract Shortness of Breath     \"wheezing\"       PHYSICAL EXAM  VITAL SIGNS:BP (!) 162/73   Pulse 72   Temp 36.4 °C (97.6 °F) (Temporal)   Resp (!) 22   Ht 1.778 m (5' 10\")   Wt 85.8 kg (189 lb 1.6 oz)   SpO2 98%   BMI 27.13 kg/m²       VITALS - vital signs documented prior to this note have been reviewed and noted,  GENERAL - awake, alert, oriented, GCS 15, no apparent distress, non-toxic  appearing  HEENT - normocephalic, atraumatic, pupils equal, sclera anicteric, mucus  membranes moist, no hale sign, raccoon eyes, or hemotympanum  NECK- trachea midline, no bruising, or abrasions  CHEST- normal rise and fall, non tender, no flail chest, no bruising, or abrasions  CARDIOVASCULAR - regular rate/rhythm, no murmurs/gallops/rubs  PULMONARY - no respiratory distress, speaking in full sentences, clear to  auscultation bilaterally, no wheezing/ronchi/rales, no accessory muscle use  GASTROINTESTINAL - tenderness to palpation of the right lower quadrant otherwise soft nontender nondistended  PELVIS non tender, stable to anterior posterior rocking,  GENITOURINARY - Deferred  BACK - C/T/L Spine demonstrate normal curvature; No external signs of trauma  on exam, no crepitus, Spinous process non " tender to palpation, no step offs or  obvious deformities  NEUROLOGIC - Awake alert, GSC 15 normal mental status, speech fluid,  cognition normal, moves all extremities  MUSCULOSKELETAL -skin tear of his right elbow and right shin  EXTREMITIES - warm, well-perfused, no cyanosis or significant edema  DERMATOLOGIC - warm, dry, no rashes, no jaundice  PSYCHIATRIC - normal affect, normal insight, normal concentration        DIAGNOSTIC STUDIES / PROCEDURES    LABS  Labs Reviewed   CBC WITH DIFFERENTIAL - Abnormal; Notable for the following components:       Result Value    WBC 12.7 (*)     Neutrophils-Polys 85.10 (*)     Lymphocytes 8.50 (*)     Neutrophils (Absolute) 10.83 (*)     All other components within normal limits   COMP METABOLIC PANEL - Abnormal; Notable for the following components:    Glucose 140 (*)     All other components within normal limits   PROTHROMBIN TIME   APTT   LIPASE   ESTIMATED GFR   URINALYSIS   PLATELET MAPPING WITH BASIC TEG   COD (ADULT)   URINE MICROSCOPIC (W/UA)       CBC shows no anemia  RADIOLOGY  I have independently interpreted the diagnostic imaging associated with this visit and am waiting the final reading from the radiologist.   My preliminary interpretation is as follows: Appears to be stranding around the right kidney concerning for perinephric hematoma      Radiologist interpretation:   CT-LSPINE W/O PLUS RECONS   Final Result         1.  No acute traumatic bony injury of the lumbar spine.   2.  Grade 1 spondylolisthesis L4 and L5   3.  Atherosclerosis      CT-TSPINE W/O PLUS RECONS   Final Result         1.  No acute traumatic bony injury of the thoracic spine.      DX-ELBOW-LIMITED 2- RIGHT   Final Result         1.  No acute traumatic bony injury.         CT-CHEST,ABDOMEN,PELVIS WITH   Final Result         1.  Grade 2 anterior right renal laceration with perinephric hematoma   2.  Small left adrenal nodule, indeterminate, recommend follow-up adrenal protocol CT for further  characterization as clinically appropriate.   3.  Small fat-containing left inguinal hernia   4.  Atherosclerosis and atherosclerotic coronary artery disease      These findings were discussed with the patient's clinician, Jonathan Wilson, on 5/23/2024 10:36 PM.      CT-HEAD W/O   Final Result         1.  No acute intracranial abnormality.   2.  Mild bilateral maxillary and ethmoid sinusitis changes   3.  Atherosclerosis.         CT-CSPINE WITHOUT PLUS RECONS   Final Result         1.  Multilevel degenerative changes of the cervical spine limit diagnostic sensitivity of this examination, otherwise no acute traumatic bony injury of the cervical spine is apparent.   2.  Atherosclerotic calcifications are seen           COURSE & MEDICAL DECISION MAKING    ED COURSE:        INTERVENTIONS BY ME:  Medications   fentaNYL (Sublimaze) injection 100 mcg (has no administration in time range)   fentaNYL (Sublimaze) injection 100 mcg (100 mcg Intravenous Given 5/23/24 2136)   ondansetron (Zofran) syringe/vial injection 4 mg (4 mg Intravenous Given 5/23/24 2135)   iohexol (OMNIPAQUE) 350 mg/mL (IV) (100 mL Intravenous Given 5/23/24 2225)           INITIAL ASSESSMENT, COURSE AND PLAN  Care Narrative: Patient presented for evaluation of right lower quadrant abdominal pain after a mechanical trip and fall at home.  On examination he does have tenderness with palpation of his right lower abdomen.  Did obtain a denies any head strike or LOC though given his age and possible distracting injury trauma pan scan was ordered, which was remarkable for grade 2 anterior right renal laceration with a perinephric hematoma.  He is otherwise hemodynamically stable, his pain was improved after dose of fentanyl..  Patient denies any blood thinners or antiplatelet use.  Patient will be transferred to [facility] for trauma surgery evaluation given his acute traumatic grade 2 right renal laceration with perinephric hematoma.  Spoke with Dr. Rome  who graciously agreed to accept patient for ED to ED transfer             ADDITIONAL PROBLEM LIST    DISPOSITION AND DISCUSSIONS  I have discussed management of the patient with the following physicians and HARITHA's: Dr. Rome ED physician    Discussion of management with other [facility] or appropriate source(s): Radiologist discussed the CT findings with the radiologist      Escalation of care considered, and ultimately not performed:IV fluids      FINAL DIAGNOSIS  1. Fall, initial encounter Acute   2. Skin tear of right elbow without complication, initial encounter Acute   3. Traumatic perinephric hematoma of right kidney, initial encounter Acute   4. Skin tear of right lower leg without complication, initial encounter Acute            Electronically signed by: Jonathan Parada DO ,11:22 PM 05/23/24

## 2024-05-24 NOTE — PROGRESS NOTES
Trauma / Surgical Daily Progress Note    Date of Service  5/24/2024    Chief Complaint  83 y.o. male admitted 5/24/2024 with grade II renal laceration after sustaining a mechanical fall.    Interval Events  Awaiting torres bed in ED.  Tertiary exam negative.  Hemoglobin trend down to 13.9 from 14.9 without hemodynamic instability.   Denies hematuria.    - Geriatrics consult  - Mobilize with PT & OT for discharge recs  - Disposition: pending therapy recommendations    Review of Systems  Review of Systems   Constitutional:  Negative for chills, diaphoresis, fever and malaise/fatigue.   HENT:  Negative for ear discharge, ear pain, hearing loss and tinnitus.    Eyes:  Negative for blurred vision, double vision, photophobia and pain.   Respiratory:  Negative for shortness of breath.    Cardiovascular:  Negative for chest pain.   Gastrointestinal:  Negative for abdominal pain, nausea and vomiting.   Genitourinary:  Negative for flank pain and hematuria.        Voiding, denies retention.   Musculoskeletal:  Negative for back pain, joint pain, myalgias and neck pain.   Neurological: Negative.       Vital Signs  Temp:  [36.4 °C (97.6 °F)-37.1 °C (98.7 °F)] 37.1 °C (98.7 °F)  Pulse:  [56-85] 61  Resp:  [16-22] 16  BP: (115-196)/() 155/70  SpO2:  [91 %-99 %] 91 %    Physical Exam  Physical Exam  Constitutional:       General: He is not in acute distress.     Appearance: He is not ill-appearing.   HENT:      Head: Normocephalic and atraumatic.      Right Ear: External ear normal.      Left Ear: External ear normal.      Nose: Nose normal.      Mouth/Throat:      Mouth: Mucous membranes are moist.      Pharynx: Oropharynx is clear.   Eyes:      Extraocular Movements: Extraocular movements intact.      Pupils: Pupils are equal, round, and reactive to light.   Cardiovascular:      Rate and Rhythm: Normal rate and regular rhythm.   Pulmonary:      Effort: Pulmonary effort is normal. No respiratory distress.      Breath  sounds: Normal breath sounds.   Chest:      Chest wall: No tenderness.   Abdominal:      General: Bowel sounds are normal. There is no distension.      Palpations: Abdomen is soft.      Tenderness: There is no abdominal tenderness. There is no guarding.   Musculoskeletal:         General: No swelling, tenderness or deformity.      Cervical back: Normal range of motion and neck supple. No tenderness.      Right lower leg: No edema.      Left lower leg: No edema.   Skin:     General: Skin is warm and dry.      Capillary Refill: Capillary refill takes less than 2 seconds.   Neurological:      Mental Status: He is alert and oriented to person, place, and time. Mental status is at baseline.      GCS: GCS eye subscore is 4. GCS verbal subscore is 5. GCS motor subscore is 6.   Psychiatric:         Behavior: Behavior normal. Behavior is cooperative.         Laboratory  Recent Results (from the past 24 hour(s))   CBC WITH DIFFERENTIAL    Collection Time: 05/23/24  9:30 PM   Result Value Ref Range    WBC 12.7 (H) 4.8 - 10.8 K/uL    RBC 5.16 4.70 - 6.10 M/uL    Hemoglobin 14.8 14.0 - 18.0 g/dL    Hematocrit 45.2 42.0 - 52.0 %    MCV 87.6 81.4 - 97.8 fL    MCH 28.7 27.0 - 33.0 pg    MCHC 32.7 32.3 - 36.5 g/dL    RDW 42.5 35.9 - 50.0 fL    Platelet Count 206 164 - 446 K/uL    MPV 10.2 9.0 - 12.9 fL    Neutrophils-Polys 85.10 (H) 44.00 - 72.00 %    Lymphocytes 8.50 (L) 22.00 - 41.00 %    Monocytes 5.10 0.00 - 13.40 %    Eosinophils 0.90 0.00 - 6.90 %    Basophils 0.20 0.00 - 1.80 %    Immature Granulocytes 0.20 0.00 - 0.90 %    Nucleated RBC 0.00 0.00 - 0.20 /100 WBC    Neutrophils (Absolute) 10.83 (H) 1.82 - 7.42 K/uL    Lymphs (Absolute) 1.08 1.00 - 4.80 K/uL    Monos (Absolute) 0.65 0.00 - 0.85 K/uL    Eos (Absolute) 0.12 0.00 - 0.51 K/uL    Baso (Absolute) 0.03 0.00 - 0.12 K/uL    Immature Granulocytes (abs) 0.02 0.00 - 0.11 K/uL    NRBC (Absolute) 0.00 K/uL   PROTHROMBIN TIME    Collection Time: 05/23/24  9:30 PM   Result  Value Ref Range    PT 13.2 12.0 - 14.6 sec    INR 0.96 0.87 - 1.13   APTT    Collection Time: 05/23/24  9:30 PM   Result Value Ref Range    APTT 26.1 24.7 - 36.0 sec   COMP METABOLIC PANEL    Collection Time: 05/23/24  9:30 PM   Result Value Ref Range    Sodium 138 135 - 145 mmol/L    Potassium 5.2 3.6 - 5.5 mmol/L    Chloride 102 96 - 112 mmol/L    Co2 26 20 - 33 mmol/L    Anion Gap 10.0 7.0 - 16.0    Glucose 140 (H) 65 - 99 mg/dL    Bun 14 8 - 22 mg/dL    Creatinine 0.95 0.50 - 1.40 mg/dL    Calcium 9.6 8.4 - 10.2 mg/dL    Correct Calcium 9.4 8.5 - 10.5 mg/dL    AST(SGOT) 18 12 - 45 U/L    ALT(SGPT) 10 2 - 50 U/L    Alkaline Phosphatase 75 30 - 99 U/L    Total Bilirubin 0.5 0.1 - 1.5 mg/dL    Albumin 4.3 3.2 - 4.9 g/dL    Total Protein 7.1 6.0 - 8.2 g/dL    Globulin 2.8 1.9 - 3.5 g/dL    A-G Ratio 1.5 g/dL   LIPASE    Collection Time: 05/23/24  9:30 PM   Result Value Ref Range    Lipase 48 11 - 82 U/L   ESTIMATED GFR    Collection Time: 05/23/24  9:30 PM   Result Value Ref Range    GFR (CKD-EPI) 80 >60 mL/min/1.73 m 2   COD - Adult (Type and Screen)    Collection Time: 05/23/24 10:40 PM   Result Value Ref Range    ABO Grouping Only O     Rh Grouping Only POS     Antibody Screen-Cod NEG    URINALYSIS    Collection Time: 05/23/24 11:06 PM    Specimen: Urine   Result Value Ref Range    Color Yellow     Character Clear     Specific Gravity 1.010 <1.035    Ph 7.5 5.0 - 8.0    Glucose Negative Negative mg/dL    Ketones Negative Negative mg/dL    Protein Negative Negative mg/dL    Bilirubin Negative Negative    Nitrite Negative Negative    Leukocyte Esterase Negative Negative    Occult Blood Trace (A) Negative    Micro Urine Req Microscopic    URINE MICROSCOPIC (W/UA)    Collection Time: 05/23/24 11:06 PM   Result Value Ref Range    WBC 0-2 (A) /hpf    Bacteria Negative None /hpf    Epithelial Cells Rare Few /hpf   COD - Adult (Type and Screen)    Collection Time: 05/24/24 12:10 AM   Result Value Ref Range    ABO  Grouping Only O     Rh Grouping Only POS     Antibody Screen-Cod NEG    DIAGNOSTIC ALCOHOL    Collection Time: 05/24/24 12:13 AM   Result Value Ref Range    Diagnostic Alcohol <10.1 <10.1 mg/dL   CBC WITHOUT DIFFERENTIAL    Collection Time: 05/24/24 12:13 AM   Result Value Ref Range    WBC 13.1 (H) 4.8 - 10.8 K/uL    RBC 5.19 4.70 - 6.10 M/uL    Hemoglobin 14.7 14.0 - 18.0 g/dL    Hematocrit 45.4 42.0 - 52.0 %    MCV 87.5 81.4 - 97.8 fL    MCH 28.3 27.0 - 33.0 pg    MCHC 32.4 32.3 - 36.5 g/dL    RDW 42.5 35.9 - 50.0 fL    Platelet Count 213 164 - 446 K/uL    MPV 10.6 9.0 - 12.9 fL   Comp Metabolic Panel    Collection Time: 05/24/24 12:13 AM   Result Value Ref Range    Sodium 135 135 - 145 mmol/L    Potassium 5.1 3.6 - 5.5 mmol/L    Chloride 100 96 - 112 mmol/L    Co2 23 20 - 33 mmol/L    Anion Gap 12.0 7.0 - 16.0    Glucose 130 (H) 65 - 99 mg/dL    Bun 15 8 - 22 mg/dL    Creatinine 0.91 0.50 - 1.40 mg/dL    Calcium 9.4 8.5 - 10.5 mg/dL    Correct Calcium 9.2 8.5 - 10.5 mg/dL    AST(SGOT) 20 12 - 45 U/L    ALT(SGPT) 12 2 - 50 U/L    Alkaline Phosphatase 72 30 - 99 U/L    Total Bilirubin 0.7 0.1 - 1.5 mg/dL    Albumin 4.3 3.2 - 4.9 g/dL    Total Protein 7.1 6.0 - 8.2 g/dL    Globulin 2.8 1.9 - 3.5 g/dL    A-G Ratio 1.5 g/dL   Prothrombin Time    Collection Time: 05/24/24 12:13 AM   Result Value Ref Range    PT 14.0 12.0 - 14.6 sec    INR 1.06 0.87 - 1.13   APTT    Collection Time: 05/24/24 12:13 AM   Result Value Ref Range    APTT 27.3 24.7 - 36.0 sec   ESTIMATED GFR    Collection Time: 05/24/24 12:13 AM   Result Value Ref Range    GFR (CKD-EPI) 84 >60 mL/min/1.73 m 2   PLATELET MAPPING WITH BASIC TEG    Collection Time: 05/24/24 12:19 AM   Result Value Ref Range    Reaction Time Initial-R 4.5 (L) 4.6 - 9.1 min    React Time Initial Hep 5.1 4.3 - 8.3 min    Clot Kinetics-K 1.3 0.8 - 2.1 min    Clot Angle-Angle 73.8 63.0 - 78.0 degrees    Maximum Clot Strength-MA 58.5 52.0 - 69.0 mm    TEG Functional Fibrinogen(MA)  18.0 15.0 - 32.0 mm    Lysis 30 minutes-LY30 1.0 0.0 - 2.6 %    % Inhibition ADP 26.0 (H) 0.0 - 17.0 %    % Inhibition AA 23.5 (H) 0.0 - 11.0 %    TEG Algorithm Link Algorithm    Hemoglobin - Q6 hours x4    Collection Time: 05/24/24 12:48 AM   Result Value Ref Range    Hemoglobin 14.9 14.0 - 18.0 g/dL   Basic Metabolic Panel (BMP): Tomorrow AM    Collection Time: 05/24/24  4:00 AM   Result Value Ref Range    Sodium 136 135 - 145 mmol/L    Potassium 5.3 3.6 - 5.5 mmol/L    Chloride 100 96 - 112 mmol/L    Co2 25 20 - 33 mmol/L    Glucose 155 (H) 65 - 99 mg/dL    Bun 15 8 - 22 mg/dL    Creatinine 1.05 0.50 - 1.40 mg/dL    Calcium 9.0 8.5 - 10.5 mg/dL    Anion Gap 11.0 7.0 - 16.0   ESTIMATED GFR    Collection Time: 05/24/24  4:00 AM   Result Value Ref Range    GFR (CKD-EPI) 71 >60 mL/min/1.73 m 2   Hemoglobin - Q6 hours x4    Collection Time: 05/24/24  7:11 AM   Result Value Ref Range    Hemoglobin 13.9 (L) 14.0 - 18.0 g/dL       Fluids    Intake/Output Summary (Last 24 hours) at 5/24/2024 1012  Last data filed at 5/24/2024 0018  Gross per 24 hour   Intake 0 ml   Output 0 ml   Net 0 ml       Core Measures & Quality Metrics  Labs reviewed, Medications reviewed and Radiology images reviewed  Jesus catheter: No Jesus      DVT Prophylaxis: Contraindicated - High bleeding risk  DVT prophylaxis - mechanical: SCDs  Ulcer prophylaxis: Not indicated        RAP Score Total: 8    CAGE Results: not completed Blood Alcohol>0.08: no     Assessment/Plan  * Trauma- (present on admission)  Assessment & Plan  Mechanical ground level fall.  Trauma Yellow Transfer Activation from [facility] in [place], [place].  Jeannie Claudio MD. Trauma Surgery.    Renal injury, right, initial encounter- (present on admission)  Assessment & Plan  Grade II anterior right renal laceration with perinephric hematoma.  Benign abdominal exam on admission.  Serial hemograms.     Aspirin long-term use- (present on admission)  Assessment &  Plan  TEG with PM without significant platelet inhibition.  Aspirin held on admission.    Contraindication to deep vein thrombosis (DVT) prophylaxis- (present on admission)  Assessment & Plan  VTE prophylaxis initially contraindicated secondary to elevated bleeding risk.  5/25 Trauma surveillance venous duplex ultrasonography ordered.    Primary hypertension- (present on admission)  Assessment & Plan  No outpatient medication regimen.   PRN antihypertensives.     Multiple skin tears- (present on admission)  Assessment & Plan  Noted to the right lower extremity and right elbow.   Bacitracin.     Parkinson's disease (HCC)- (present on admission)  Assessment & Plan  Chronic condition treated with carbidopa-levodopa.  Resume pending medication reconciliation.  PT/OT.    PAF (paroxysmal atrial fibrillation) ([facility])- (present on admission)  Assessment & Plan  Chronic condition treated with as needed flecainide. No anticoagulant use.   Resume pending medication reconciliation.      Mental status adequate for full examination?: Yes    Spine cleared (radiologically and/or clinically): Yes    All current laboratory studies/radiology exams reviewed: Yes    Medications reconciliation has been reviewed: Yes    Completed Consultations:  None     Pending Consultations:  Geriatric Medicine    Newly identified diagnoses, injuries and/or co-morbidities:  None noted at time of exam.    PDI - not completed.    Discussed patient condition with RN, Patient, and trauma surgery, Dr. Claudio.

## 2024-05-24 NOTE — ASSESSMENT & PLAN NOTE
Grade II anterior right renal laceration with perinephric hematoma.  Benign abdominal exam on admission.  Serial hemograms.

## 2024-05-24 NOTE — ED NOTES
Bedside report received from VEENA Aquino. Fall risk precautions in place. Call bell in reach. Pt on room air, all lines traced. LR running IV. POC discussed with pt.

## 2024-05-24 NOTE — ED TRIAGE NOTES
"Chief Complaint   Patient presents with    GLF     BIB EMS from , Tripped and fell over a garden hose today around 1715H, fell on his right side  of his body - thinners;no head injury , denies pain upon arrival, no LOC, noted skin tear on right arm; AOX4 GCS15; on 4L oxygen via nasal cannula     BP (!) 196/95   Pulse 85   Temp 37.1 °C (98.7 °F)   Resp 20   Ht 1.778 m (5' 10\")   Wt 85.8 kg (189 lb 2.5 oz)   SpO2 97%   BMI 27.14 kg/m²     "

## 2024-05-24 NOTE — CONSULTS
Geriatric Medicine Consultation  Date of Service 5/24/2024    Referring Physician  Jeannie Claudio M.D.    Consulting Physician  Aravind Jimenez M.D.    Reason for Consultation  Fall, Parkinson's disease, atrial fibrillation    History of Presenting Illness  83 y.o. male who presented 5/24/2024 with fall resulting in right renal injury.  Patient was admitted by trauma surgery.  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  Denies history of frequent falls, states that last time he fell was more than a year ago.  Denies using any assistive devices for ambulation.  He has known history of Parkinson disease, main symptoms are tremors in both hands, denies any cognitive impairment, or urinary incontinence.  He denies any vision or hearing impairment.  States he is independent in ADLs and IADLs.  Also known history of paroxysmal atrial fibrillation, states that he only takes the flecainide as needed, and last time he took it was probably more than year ago.  Patient states that he can usually tell when he is in atrial fibrillation with his symptoms of palpitations.  His cardiologist told him that he should take aspirin 81 mg a day, but he does not really take the medication.    Review of Systems  Review of Systems   Constitutional:  Negative for chills, diaphoresis, fever and malaise/fatigue.   HENT:  Negative for congestion, hearing loss and sore throat.    Eyes:  Negative for blurred vision.   Respiratory:  Negative for cough, shortness of breath and wheezing.    Cardiovascular:  Negative for chest pain, palpitations and leg swelling.   Gastrointestinal:  Negative for abdominal pain, diarrhea, heartburn, nausea and vomiting.   Genitourinary:  Negative for dysuria, flank pain and hematuria.   Musculoskeletal:  Negative for back pain, joint pain, myalgias and neck pain.   Skin:  Negative for rash.   Neurological:  Positive for tremors. Negative for dizziness, sensory  "change, speech change, focal weakness, weakness and headaches.   Psychiatric/Behavioral:  The patient is not nervous/anxious.        Past Medical History   has a past medical history of Arthritis, Bronchitis (), Cancer ([facility]) (2003), PAF (paroxysmal atrial fibrillation) ([facility]), Parkinson disease ([facility]), Pneumonia (), and SVT (supraventricular tachycardia) ([facility]).    Surgical History   has a past surgical history that includes inguinal hernia repair (Left, 1990's); other cardiac surgery; hip arthroplasty total (Right, 2002); orif, fracture, femur (Left, 1961); neck dissection (2003); inguinal hernia repair (Bilateral, 3/31/2017); umbilical hernia repair (N/A, 3/31/2017); shoulder arthroscopy w/ rotator cuff repair (Right, 1995); blepharoplasty (Bilateral, 7/29/2019); and brow lift (Right, 7/29/2019).    Family History  family history includes Cancer in his brother, father, and mother.    Social History   reports that he quit smoking about 37 years ago. His smoking use included cigarettes. He started smoking about 47 years ago. He has never used smokeless tobacco. He reports that he does not currently use alcohol. He reports that he does not use drugs.    Living situation - Lives with spouse  Marital status - M  Primary caregiver - self  Transportation - drives  POLST                   No   Health care POA   No    Financial POA       No      Medications  Prior to Admission Medications   Prescriptions Last Dose Informant Patient Reported? Taking?   carbidopa-levodopa (SINEMET)  MG Tab 5/23/2024 at 1200 Historical, Patient No Yes   Sig: TAKE 2 TABLETS BY MOUTH THREE TIMES DAILY FOR 90 DAYS   flecainide (TAMBOCOR) 150 MG Tab PRN at PRN Historical, Patient No No   Sig: Take 1 Tablet by mouth 1 time a day as needed (atrial fibrillation).      Facility-Administered Medications: None       Allergies  Allergies   Allergen Reactions    Cat Hair Extract Shortness of Breath     \"wheezing\"       Geriatric " Screening    ADL assistance              No   IADL assistance             No   Cognitive Impairment    No   Mood disorder                No   Polypharmacy                No   Vision impairment         No   Hearing impairment      No   Fall                                  Yes  Assistive device            No   Urinary incontinence    No   Nutrition issues            No   Food Insecurity            No   Pressure ulcer              No     Physical Exam  Temp:  [36.9 °C (98.4 °F)-37.1 °C (98.7 °F)] 36.9 °C (98.4 °F)  Pulse:  [56-85] 70  Resp:  [16-22] 17  BP: (115-196)/() 176/81  SpO2:  [91 %-98 %] 93 %  Physical Exam  Vitals and nursing note reviewed.   HENT:      Head: Normocephalic.      Nose: No congestion.      Mouth/Throat:      Mouth: Mucous membranes are moist.   Eyes:      Extraocular Movements: Extraocular movements intact.      Conjunctiva/sclera: Conjunctivae normal.   Cardiovascular:      Rate and Rhythm: Normal rate and regular rhythm.      Heart sounds: Murmur heard.   Pulmonary:      Effort: Pulmonary effort is normal.      Breath sounds: Normal breath sounds.   Abdominal:      General: There is no distension.      Tenderness: There is no abdominal tenderness. There is no guarding or rebound.   Musculoskeletal:      Cervical back: No tenderness.      Right lower leg: No edema.      Left lower leg: No edema.   Skin:     Findings: Abrasion present.      Comments: multiple skin tears   Neurological:      General: No focal deficit present.      Mental Status: He is alert and oriented to person, place, and time.      Cranial Nerves: No cranial nerve deficit.      Motor: Tremor present.           CAM  Acute onset and fluctuating course   No   Inattention                                         No   Disorganized thinking                        No   Altered level of consciousness          No       Laboratory  Recent Labs     05/23/24  2130 05/24/24  0013 05/24/24  0048 05/24/24  0711 05/24/24  1201   WBC  12.7* 13.1*  --   --   --    RBC 5.16 5.19  --   --   --    HEMOGLOBIN 14.8 14.7 14.9 13.9* 14.6   HEMATOCRIT 45.2 45.4  --   --  43.4   MCV 87.6 87.5  --   --   --    MCH 28.7 28.3  --   --   --    MCHC 32.7 32.4  --   --   --    RDW 42.5 42.5  --   --   --    PLATELETCT 206 213  --   --   --    MPV 10.2 10.6  --   --   --      Recent Labs     05/23/24 2130 05/24/24  0013 05/24/24  0400   SODIUM 138 135 136   POTASSIUM 5.2 5.1 5.3   CHLORIDE 102 100 100   CO2 26 23 25   GLUCOSE 140* 130* 155*   BUN 14 15 15   CREATININE 0.95 0.91 1.05   CALCIUM 9.6 9.4 9.0     Recent Labs     05/23/24 2130 05/24/24  0013   APTT 26.1 27.3   INR 0.96 1.06               Imaging  No orders to display       Assessment/Plan  * Trauma- (present on admission)  Assessment & Plan  Trauma surgery-primary service    Fall- (present on admission)  Assessment & Plan  Check vitamin B12 and TSH  PT and OT    Renal injury, right, initial encounter- (present on admission)  Assessment & Plan  Serial hemoglobin    Elevated BP without diagnosis of hypertension- (present on admission)  Assessment & Plan  IV labetalol as needed with parameters    Hyperglycemia- (present on admission)  Assessment & Plan  Check hemoglobin A1c    Leukocytosis- (present on admission)  Assessment & Plan  Follow CBC    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

## 2024-05-24 NOTE — ED NOTES
Report was given to Charge Aimee CURIEL accepting [facility] [address] and referring [facility] [address].   None

## 2024-05-24 NOTE — ED NOTES
Patient is a trauma yellow transfer from [facility], brought in by [facility], unit 25. Patient found to have right kidney laceration after a ground level fall. Prior to arrival patient received 100mcg fentanyl and 4mg zofran. Patient states he stopped taking aspirin 2 weeks ago.

## 2024-05-24 NOTE — ED NOTES
Med Rec complete per patient   Allergies reviewed  Antibiotics in the past 30 days:no  Anticoagulant in past 14 days:no  Pharmacy patient utilizes:[facility] on [address]    Pt states he does not take Aspirin

## 2024-05-24 NOTE — ASSESSMENT & PLAN NOTE
VTE prophylaxis initially contraindicated secondary to elevated bleeding risk.  5/25 Trauma surveillance venous duplex ultrasonography ordered.

## 2024-05-24 NOTE — ED NOTES
Bedside report received from off going RN/tech: Sunitha, assumed care of patient.  POC discussed with patient. Call light within reach, all needs addressed at this time.       Fall risk interventions in place: Not Applicable (all applicable per [facility] Fall risk assessment)   Continuous monitoring: Cardiac Leads, Pulse Ox, or Blood Pressure  IVF/IV medications: Infusion per MAR (List Med(s)) LR  Oxygen: Room Air  Bedside sitter: Not Applicable   Isolation: Not Applicable

## 2024-05-24 NOTE — ED NOTES
Telephone report given to receiving RN. Pt states R flank pain is mild at 2/10. Clear diet ordered, no longer needs to be NPO with uptrending H&H. Pt by jolie to room T403-02.

## 2024-05-24 NOTE — H&P
TRAUMA HISTORY AND PHYSICAL    DATE OF SERVICE: 5/24/2024    ACTIVATION LEVEL: Yellow transfer.     HISTORY OF PRESENT ILLNESS: The patient is a 83 year-old man who was injured when he fell from standing.  He tripped over a garden hose today.  He did not lose consciousness or hit his head.  Patient does have atrial fibrillation but does not take any blood thinners.  He states that he is supposed to take aspirin but that he typically does not.  He is awake and alert GCS 15.  He currently has no pain in his abdomen and minimal pain in his flank.  He was worked up at an outside hospital and noted to have a grade 2 renal laceration.  He denies any blood in his urine.    The patient was triaged to [facility] in accordance with established pre hospital protocols. An expeditious primary and secondary survey with required adjuncts was conducted. See Trauma Narrator for full details.    PAST MEDICAL HISTORY:   Past Medical History:   Diagnosis Date    Arthritis     bilateral knees    Bronchitis     Cancer ([facility]) 2003    throat Squamous Cell, with chemo/radiation, lymph nodes removal    PAF (paroxysmal atrial fibrillation) ([facility])     denies taking any meds, cardioversion last one was 2017, HX of SVT    Parkinson disease ([facility])         Pneumonia     SVT (supraventricular tachycardia) ([facility])     denies taking any meds,cardioversion         PAST SURGICAL HISTORY:   Past Surgical History:   Procedure Laterality Date    BLEPHAROPLASTY Bilateral 7/29/2019    Procedure: BLEPHAROPLASTY- UPPER;  Surgeon: Jos Baron M.D.;  Location: SURGERY [facility];  Service: Plastics    BROW LIFT Right 7/29/2019    Procedure: RHYTIDECTOMY, FOREHEAD- DIRECT BROW LIFT;  Surgeon: Jos Baron M.D.;  Location: SURGERY [facility];  Service: Plastics    INGUINAL HERNIA REPAIR Bilateral 3/31/2017    Procedure: INGUINAL HERNIA REPAIR W/MESH;  Surgeon: Michael Malin M.D.;  Location: SURGERY SAME DAY  "[facility];  Service:     UMBILICAL HERNIA REPAIR N/A 3/31/2017    Procedure: UMBILICAL HERNIA REPAIR W/MESH;  Surgeon: Michael Malin M.D.;  Location: SURGERY SAME DAY [facility];  Service:     NECK DISSECTION  2003    HIP ARTHROPLASTY TOTAL Right 2002    Hip Replacement, Total    SHOULDER ARTHROSCOPY W/ ROTATOR CUFF REPAIR Right 1995    ORIF, FRACTURE, FEMUR Left 1961    ORIF, Femur    INGUINAL HERNIA REPAIR Left 1990's    OTHER CARDIAC SURGERY      reports has had 6 cardioversions          ALLERGIES: Cat hair extract       CURRENT MEDICATIONS:   Home Medications       Reviewed by Kamila Mittal R.N. (Registered Nurse) on 05/24/24 at 0020  Med List Status: Not Addressed     Medication Last Dose Status   aspirin 81 MG EC tablet  Active   carbidopa-levodopa (SINEMET)  MG Tab  Active   flecainide (TAMBOCOR) 150 MG Tab  Active                Patient states he rarely takes his aspirin.    Please refer to the medication reconciliation in [facility]    FAMILY HISTORY: family history includes Cancer in his brother, father, and mother.      SOCIAL HISTORY:  reports that he quit smoking about 37 years ago. His smoking use included cigarettes. He started smoking about 47 years ago. He has never used smokeless tobacco. He reports that he does not currently use alcohol. He reports that he does not use drugs.      REVIEW OF SYSTEMS:   Comprehensive review of systems is negative with the exception of the aforementioned HPI, PMH, and PSH bullets in accordance with CMS guidelines.    PHYSICAL EXAMINATION:   Vital Signs: BP (!) 182/93   Pulse 80   Temp 37.1 °C (98.7 °F)   Resp 20   Ht 1.778 m (5' 10\")   Wt 85.8 kg (189 lb 2.5 oz)   SpO2 91%   Physical Exam  Vitals and nursing note reviewed. Exam conducted with a chaperone present.   Constitutional:       Comments: Tremulous   HENT:      Head: Normocephalic and atraumatic.      Right Ear: External ear normal.      Left Ear: External ear normal.      Nose: Nose normal.    "   Mouth/Throat:      Mouth: Mucous membranes are dry.      Pharynx: Oropharynx is clear.   Eyes:      Pupils: Pupils are equal, round, and reactive to light.   Cardiovascular:      Rate and Rhythm: Normal rate and regular rhythm.   Pulmonary:      Effort: Pulmonary effort is normal.      Breath sounds: Normal breath sounds.   Abdominal:      General: Abdomen is flat. There is no distension.      Palpations: Abdomen is soft.      Tenderness: There is no abdominal tenderness.   Genitourinary:     Penis: Normal.    Musculoskeletal:         General: No swelling or tenderness.   Skin:     General: Skin is warm.      Findings: Lesion present.      Comments: Skin tear on right elbow as well as right shin   Neurological:      General: No focal deficit present.      Mental Status: He is alert and oriented to person, place, and time. Mental status is at baseline.   Psychiatric:         Mood and Affect: Mood normal.         LABORATORY VALUES:   Recent Labs     05/23/24 2130   WBC 12.7*   RBC 5.16   HEMOGLOBIN 14.8   HEMATOCRIT 45.2   MCV 87.6   MCH 28.7   MCHC 32.7   RDW 42.5   PLATELETCT 206   MPV 10.2     Recent Labs     05/23/24 2130   SODIUM 138   POTASSIUM 5.2   CHLORIDE 102   CO2 26   GLUCOSE 140*   BUN 14   CREATININE 0.95   CALCIUM 9.6     Recent Labs     05/23/24 2130   ASTSGOT 18   ALTSGPT 10   TBILIRUBIN 0.5   ALKPHOSPHAT 75   GLOBULIN 2.8   INR 0.96     Recent Labs     05/23/24 2130   APTT 26.1   INR 0.96        IMAGING:   No orders to display       IMPRESSION AND PLAN:  Patient is an 83-year-old male with Parkinson's disease who tripped over his garden hose.  He fell from standing but did not lose consciousness.  He is in atrial fibrillation patient however he is not anticoagulated.  He is recommended to take aspirin every day but does not.  He was worked up at an outside hospital noted to have a grade 2 renal laceration.  He will be admitted for further observation and trending of his hemoglobin.  Tag is  pending for further evaluation of potential coagulopathy and pharmacologic effect of aspirin.  Bacitracin skin care for both of the tears.  I evaluated this patient in person at the bedside and created the plan detailed below.    * Trauma- (present on admission)  Assessment & Plan  Mechanical ground level fall.  Trauma Yellow Transfer Activation from [facility] in [place], [place].  Jeannie Claudio MD. Trauma Surgery.    Renal injury, right, initial encounter- (present on admission)  Assessment & Plan  Grade II anterior right renal laceration with perinephric hematoma.  Benign abdominal exam on admission.  Serial hemograms.     Aspirin long-term use- (present on admission)  Assessment & Plan  Unclear last dose.   TEG with PM pending.     Contraindication to deep vein thrombosis (DVT) prophylaxis- (present on admission)  Assessment & Plan  VTE prophylaxis initially contraindicated secondary to elevated bleeding risk.  5/25 Trauma surveillance venous duplex ultrasonography ordered.    Multiple skin tears- (present on admission)  Assessment & Plan  Noted to the right lower extremity and right elbow.   Bacitracin.     Parkinson's disease (HCC)- (present on admission)  Assessment & Plan  Chronic condition treated with carbidopa-levodopa.  Resume pending medication reconciliation.  PT/OT.    PAF (paroxysmal atrial fibrillation) ([facility])- (present on admission)  Assessment & Plan  Chronic condition treated with as needed flecainide. No anticoagulant use.   Resume pending medication reconciliation.        Aggregated care time spent evaluating, reviewing documentation, providing care, and managing this patient exclusive of procedures: 45 minutes  ____________________________________   Jeannie Claudio M.D.     HANNAH / DAVE     DD: 5/24/2024   DT: 12:26 AM

## 2024-05-24 NOTE — ED PROVIDER NOTES
ED Provider Note    CHIEF COMPLAINT  Trauma Yellow Transfer    EXTERNAL RECORDS REVIEWED  External ED Note from [facility] emergency room where the patient was brought into the emergency room after he had fallen over a garden hose at 5:15 PM today.  Has a history of Parkinson's, chronic A-fib and is only on intermittent aspirin dosing by choice with pains on the right side of his body.  He was nauseated and lightheaded at that time.    Imaging at their facility showed a grade 2 renal laceration he had no hemodynamic instability was transferred to our facility for assessment by trauma.  Solid organ injury is trauma yellow activation.    HPI/ROS  LIMITATION TO HISTORY   Select: : None  OUTSIDE HISTORIAN(S):  EMS report given at bedside.    Hamlet Del Cid is a 83 y.o. male who presents to the emergency room brought in by EMS personnel as a trauma yellow activation and is evaluated by myself and the trauma surgeon in the trauma bay.  Patient had a ground-level fall, says he fell on a garden hose and when he landed primarily on his right arm, right shin and flank.  He denies loss consciousness, said he was having significant amount of pain and discomfort and points to his right lower quadrant.  He had no pain with deep inspiration, no hypoxia though did require amount of pain medications and lab work and trauma evaluation was performed at that facility.  There was no concerning anemia, no prodromal symptomology for syncope or other causation of his fall.  Imaging showed stranding around the right kidney concerning for perinephric hematoma.    CT of the abdomen pelvis with contrast shows the followin.  Grade 2 anterior right renal laceration with perinephric hematoma   2.  Small left adrenal nodule, indeterminate, recommend follow-up adrenal protocol CT for further characterization as clinically appropriate.   3.  Small fat-containing left inguinal hernia   4.  Atherosclerosis and atherosclerotic coronary artery  "disease       PAST MEDICAL HISTORY   has a past medical history of Arthritis, Bronchitis (), Cancer (HCC) (), PAF (paroxysmal atrial fibrillation) ([facility]), Parkinson disease ([facility]), Pneumonia (), and SVT (supraventricular tachycardia) ([facility]).    SURGICAL HISTORY   has a past surgical history that includes inguinal hernia repair (Left, ); other cardiac surgery; hip arthroplasty total (Right, ); orif, fracture, femur (Left, ); neck dissection (); inguinal hernia repair (Bilateral, 3/31/2017); umbilical hernia repair (N/A, 3/31/2017); shoulder arthroscopy w/ rotator cuff repair (Right, ); blepharoplasty (Bilateral, 2019); and brow lift (Right, 2019).    FAMILY HISTORY  Family History   Problem Relation Age of Onset    Cancer Mother     Cancer Father     Cancer Brother        SOCIAL HISTORY  Social History     Tobacco Use    Smoking status: Former     Current packs/day: 0.00     Types: Cigarettes     Start date: 1977     Quit date: 1987     Years since quittin.4    Smokeless tobacco: Never   Vaping Use    Vaping status: Never Used   Substance and Sexual Activity    Alcohol use: Not Currently     Comment: occ    Drug use: No    Sexual activity: Not on file       CURRENT MEDICATIONS  Home Medications       Reviewed by Kamila Mittal R.N. (Registered Nurse) on 24 at 0020  Med List Status: Not Addressed     Medication Last Dose Status   aspirin 81 MG EC tablet  Active   carbidopa-levodopa (SINEMET)  MG Tab  Active   flecainide (TAMBOCOR) 150 MG Tab  Active                    ALLERGIES  Allergies   Allergen Reactions    Cat Hair Extract Shortness of Breath     \"wheezing\"       PHYSICAL EXAM  VITAL SIGNS: BP (!) 182/93   Pulse 80   Temp 37.1 °C (98.7 °F)   Resp 20   Ht 1.778 m (5' 10\")   Wt 85.8 kg (189 lb 2.5 oz)   SpO2 91%   BMI 27.14 kg/m²    PRIMARY SURVEY:   Airway: Intact.   Breathing: Equal breath sounds bilaterally.   Circulation: " Non-muffled. Heart tones. Femoral pulses 2+ and symmetric.   Disability: GCS: 15.     SECONDARY SURVEY:   General: Alert, Oriented x3. No acute distress. Non-toxic appearing.   Head: Normocephalic, Atraumatic.   Eyes: Pupils: R: 3 mm, L:3 mm. EOMI. Sclerae/Conjunctivae normal in appearance. No Raccoon Eyes.   Nose: No septal hematomas.   Ears: No hemotymapnum, no Sylvester Sign.   Mouth: No midface instability. No malocclusion.   Neck: No midline tenderness, step-off, or hematoma.   Back: No TTP. No step-off, or hematoma.   Chest: No retractions. Chest wall is non-tender   Lungs: Clear and equal to auscultation bilaterally. No wheezes, rales, or rhonchi. No respiratory distress.   Cardiovascular: Regular Rate and Rhythm. Normal S1 and S2.   Abdomen: Soft, non-distended, consistent tenderness along the right lower quadrant and right flank with no ecchymosis.  No suprapubic tenderness and no pain with bilateral straight leg raise. No rebound or guarding.   Pelvis: Stable   Musculoskeletal: Full active and passive ROM of bilateral shoulders, elbows, wrists, hips, knees, and ankles without pain or tenderness.  There are superficial skin abrasions noted on the midline anterior proximal tibia and on the lateral aspects of his distal right arm.  Bleeding easily controlled  Neuro: A&O x4. Motor: 5/5 to flexion/extension of all 4 extremities. Sensory intact in all 4 extremities.   Skin: Right-sided upper and lower skin abrasions    EKG/LABS  Labs Reviewed   CBC WITHOUT DIFFERENTIAL - Abnormal; Notable for the following components:       Result Value    WBC 13.1 (*)     All other components within normal limits   COMP METABOLIC PANEL - Abnormal; Notable for the following components:    Glucose 130 (*)     All other components within normal limits   PLATELET MAPPING WITH BASIC TEG - Abnormal; Notable for the following components:    Reaction Time Initial-R 4.5 (*)     % Inhibition ADP 26.0 (*)     % Inhibition AA 23.5 (*)     All  other components within normal limits   HGB   DIAGNOSTIC ALCOHOL   PROTHROMBIN TIME   APTT   COD (ADULT)   ESTIMATED GFR   BASIC METABOLIC PANEL   COMPONENT CELLULAR   POCT GLUCOSE   POCT GLUCOSE   POCT GLUCOSE   POCT GLUCOSE     RADIOLOGY/PROCEDURES   Outside imaging is reviewed      COURSE & MEDICAL DECISION MAKING    ASSESSMENT, COURSE AND PLAN  Care Narrative: Patient presents emergency room for symptoms as described above.  I reviewed the patient's chart and he is not on blood thinners and has been on flecainide for paroxysmal A-fib.  He did not want to be anticoagulated and today presents with no prodromal syncopal ideology and had a mechanical ground-level fall and was found to have an isolated right renal laceration grade 2.  He is hemodynamically stable.  There is no interval complaints and patient has some slight hypertension and had missed his Sinemet dosing.  He is evaluated by myself and trauma surgeon at the bedside at this time there is no acute indication for serial imaging and the patient has no hemodynamic instability or other evolving changes that are concerning for further emergent interventions.  I have discussed the case with trauma and at this time the patient will be admitted to their service for serial evaluations.  Patient's pain is currently controlled as he had recently received pain medications he will be admitted in guarded condition.  There are no emergent consultations indicated at this time.    FINAL DIAGNOSIS  1. Fall, initial encounter    2. Multiple abrasions    3. Laceration of right kidney, initial encounter    4. Parkinson's disease with dyskinesia, unspecified whether manifestations fluctuate (HCC)      Electronically signed by: Adithya Rome M.D., 5/24/2024 12:17 AM

## 2024-05-24 NOTE — ASSESSMENT & PLAN NOTE
Chronic condition treated with as needed flecainide. No anticoagulant use.   Resumed maintenance medication on admission.

## 2024-05-24 NOTE — ED NOTES
Pt resting on gurney, connected to monitor, call light within reach, no needs at this time, aware of POC

## 2024-05-24 NOTE — ED TRIAGE NOTES
"83 yr old male to triage  Chief Complaint   Patient presents with    T-5000 GLF     Patient report he tripped over a garden hose approximately 5:15 pm today and fell on his right side of his body.  Patient report of right elbow, right knee and right side pain.  Denies any blood in his urine.  Slightly nauseated and lightheaded.  Patient denies hitting his head.  And patient is not taking any blood thinners.     Elbow Pain    Knee Pain    Pain     Patient does take aspirin 81 mg but last dose was 2 weeks ago.    BP (!) 154/83   Pulse 63   Temp 36.4 °C (97.6 °F) (Temporal)   Resp 16   Ht 1.778 m (5' 10\")   Wt 85.8 kg (189 lb 1.6 oz)   SpO2 96%   BMI 27.13 kg/m²     "

## 2024-05-25 VITALS
BODY MASS INDEX: 27.08 KG/M2 | HEART RATE: 64 BPM | DIASTOLIC BLOOD PRESSURE: 79 MMHG | WEIGHT: 189.15 LBS | OXYGEN SATURATION: 96 % | SYSTOLIC BLOOD PRESSURE: 146 MMHG | TEMPERATURE: 97.9 F | RESPIRATION RATE: 18 BRPM | HEIGHT: 70 IN

## 2024-05-25 PROBLEM — E53.8 VITAMIN B12 DEFICIENCY: Status: ACTIVE | Noted: 2024-05-25

## 2024-05-25 LAB
ANION GAP SERPL CALC-SCNC: 12 MMOL/L (ref 7–16)
BASOPHILS # BLD AUTO: 0.3 % (ref 0–1.8)
BASOPHILS # BLD: 0.03 K/UL (ref 0–0.12)
BUN SERPL-MCNC: 14 MG/DL (ref 8–22)
CALCIUM SERPL-MCNC: 8.8 MG/DL (ref 8.5–10.5)
CHLORIDE SERPL-SCNC: 100 MMOL/L (ref 96–112)
CO2 SERPL-SCNC: 23 MMOL/L (ref 20–33)
CREAT SERPL-MCNC: 0.91 MG/DL (ref 0.5–1.4)
EOSINOPHIL # BLD AUTO: 0.13 K/UL (ref 0–0.51)
EOSINOPHIL NFR BLD: 1.4 % (ref 0–6.9)
ERYTHROCYTE [DISTWIDTH] IN BLOOD BY AUTOMATED COUNT: 41.3 FL (ref 35.9–50)
EST. AVERAGE GLUCOSE BLD GHB EST-MCNC: 126 MG/DL
GFR SERPLBLD CREATININE-BSD FMLA CKD-EPI: 84 ML/MIN/1.73 M 2
GLUCOSE SERPL-MCNC: 129 MG/DL (ref 65–99)
HBA1C MFR BLD: 6 % (ref 4–5.6)
HCT VFR BLD AUTO: 39.7 % (ref 42–52)
HGB BLD-MCNC: 13.3 G/DL (ref 14–18)
IMM GRANULOCYTES # BLD AUTO: 0.03 K/UL (ref 0–0.11)
IMM GRANULOCYTES NFR BLD AUTO: 0.3 % (ref 0–0.9)
LYMPHOCYTES # BLD AUTO: 1.21 K/UL (ref 1–4.8)
LYMPHOCYTES NFR BLD: 13.2 % (ref 22–41)
MCH RBC QN AUTO: 28.5 PG (ref 27–33)
MCHC RBC AUTO-ENTMCNC: 33.5 G/DL (ref 32.3–36.5)
MCV RBC AUTO: 85.2 FL (ref 81.4–97.8)
MONOCYTES # BLD AUTO: 0.62 K/UL (ref 0–0.85)
MONOCYTES NFR BLD AUTO: 6.8 % (ref 0–13.4)
NEUTROPHILS # BLD AUTO: 7.12 K/UL (ref 1.82–7.42)
NEUTROPHILS NFR BLD: 78 % (ref 44–72)
NRBC # BLD AUTO: 0 K/UL
NRBC BLD-RTO: 0 /100 WBC (ref 0–0.2)
PLATELET # BLD AUTO: 180 K/UL (ref 164–446)
PMV BLD AUTO: 10.2 FL (ref 9–12.9)
POTASSIUM SERPL-SCNC: 4.2 MMOL/L (ref 3.6–5.5)
RBC # BLD AUTO: 4.66 M/UL (ref 4.7–6.1)
SODIUM SERPL-SCNC: 135 MMOL/L (ref 135–145)
TSH SERPL DL<=0.005 MIU/L-ACNC: 0.56 UIU/ML (ref 0.38–5.33)
VIT B12 SERPL-MCNC: 199 PG/ML (ref 211–911)
WBC # BLD AUTO: 9.1 K/UL (ref 4.8–10.8)

## 2024-05-25 PROCEDURE — A9270 NON-COVERED ITEM OR SERVICE: HCPCS

## 2024-05-25 PROCEDURE — 99232 SBSQ HOSP IP/OBS MODERATE 35: CPT | Performed by: FAMILY MEDICINE

## 2024-05-25 PROCEDURE — 97165 OT EVAL LOW COMPLEX 30 MIN: CPT

## 2024-05-25 PROCEDURE — 84443 ASSAY THYROID STIM HORMONE: CPT

## 2024-05-25 PROCEDURE — 99239 HOSP IP/OBS DSCHRG MGMT >30: CPT

## 2024-05-25 PROCEDURE — 83036 HEMOGLOBIN GLYCOSYLATED A1C: CPT

## 2024-05-25 PROCEDURE — 80048 BASIC METABOLIC PNL TOTAL CA: CPT

## 2024-05-25 PROCEDURE — 700102 HCHG RX REV CODE 250 W/ 637 OVERRIDE(OP)

## 2024-05-25 PROCEDURE — 82607 VITAMIN B-12: CPT

## 2024-05-25 PROCEDURE — 700101 HCHG RX REV CODE 250

## 2024-05-25 PROCEDURE — 85025 COMPLETE CBC W/AUTO DIFF WBC: CPT

## 2024-05-25 PROCEDURE — 700111 HCHG RX REV CODE 636 W/ 250 OVERRIDE (IP): Performed by: FAMILY MEDICINE

## 2024-05-25 RX ORDER — BACITRACIN ZINC AND POLYMYXIN B SULFATE 500; 1000 [USP'U]/G; [USP'U]/G
1 OINTMENT TOPICAL 2 TIMES DAILY
Status: ACTIVE | COMMUNITY
Start: 2024-05-25

## 2024-05-25 RX ORDER — ACETAMINOPHEN 325 MG/1
650 TABLET ORAL EVERY 4 HOURS PRN
COMMUNITY
Start: 2024-05-25

## 2024-05-25 RX ORDER — UREA 10 %
1000 LOTION (ML) TOPICAL DAILY
Status: DISCONTINUED | OUTPATIENT
Start: 2024-05-26 | End: 2024-05-25 | Stop reason: HOSPADM

## 2024-05-25 RX ORDER — FLECAINIDE ACETATE 150 MG/1
150 TABLET ORAL DAILY
Status: DISCONTINUED | OUTPATIENT
Start: 2024-05-25 | End: 2024-05-25

## 2024-05-25 RX ORDER — CYANOCOBALAMIN 1000 UG/ML
1000 INJECTION, SOLUTION INTRAMUSCULAR; SUBCUTANEOUS ONCE
Qty: 1 ML | Refills: 0 | Status: COMPLETED | OUTPATIENT
Start: 2024-05-25 | End: 2024-05-25

## 2024-05-25 RX ADMIN — Medication 1 EACH: at 05:30

## 2024-05-25 RX ADMIN — CARBIDOPA AND LEVODOPA 2 TABLET: 25; 100 TABLET ORAL at 05:28

## 2024-05-25 RX ADMIN — CYANOCOBALAMIN 1000 MCG: 1000 INJECTION, SOLUTION INTRAMUSCULAR; SUBCUTANEOUS at 09:26

## 2024-05-25 ASSESSMENT — COGNITIVE AND FUNCTIONAL STATUS - GENERAL
DAILY ACTIVITIY SCORE: 24
SUGGESTED CMS G CODE MODIFIER DAILY ACTIVITY: CH

## 2024-05-25 ASSESSMENT — ENCOUNTER SYMPTOMS
BLURRED VISION: 0
ABDOMINAL PAIN: 0
SORE THROAT: 0
WEAKNESS: 0
NECK PAIN: 0
EYES NEGATIVE: 1
DIARRHEA: 0
DIZZINESS: 0
NEUROLOGICAL NEGATIVE: 1
SENSORY CHANGE: 0
WHEEZING: 0
FLANK PAIN: 0
VOMITING: 0
SHORTNESS OF BREATH: 0
MUSCULOSKELETAL NEGATIVE: 1
SPEECH CHANGE: 0
FOCAL WEAKNESS: 0
BACK PAIN: 0
MYALGIAS: 0
HEARTBURN: 0
FEVER: 0
HEADACHES: 0
COUGH: 0
CONSTITUTIONAL NEGATIVE: 1
NERVOUS/ANXIOUS: 0
CHILLS: 0
TREMORS: 1
DIAPHORESIS: 0
PALPITATIONS: 0
NAUSEA: 0

## 2024-05-25 ASSESSMENT — ACTIVITIES OF DAILY LIVING (ADL): TOILETING: INDEPENDENT

## 2024-05-25 NOTE — THERAPY
Occupational Therapy   Initial Evaluation     Patient Name: Hamlet Del Cid  Age:  83 y.o., Sex:  male  Medical Record #: [id number]  Today's Date: 5/25/2024    Comments: (P) 10lb lifting restriction    Assessment  Patient is 83 y.o. male admitted after GLF tripping on hose with grade II renal laceration with hx of PD seen for OT evaluation. Pt presented at mod I level for self cares/mobility, provided education on compensatory strategies for lifting restriction. Anticipate DC home with spouse support. Patient will not be actively followed for occupational therapy services at this time, however may be seen if requested by physician for 1 more visit within 30 days to address any discharge or equipment needs.     Plan    Occupational Therapy Initial Treatment Plan   Duration: (P) Evaluation only    DC Equipment Recommendations: (P) None  Discharge Recommendations: (P) Anticipate that the patient will have no further occupational therapy needs after discharge from the hospital      05/25/24 1034   Initial Contact Note    Initial Contact Note Order Received and Verified, Evaluation Only - Patient Does Not Require Further Acute Occupational Therapy at this Time.  However, May Benefit from Post Acute Therapy for Higher Level Functional Deficits.   Prior Living Situation   Housing / Facility 3 Story House   Bathroom Set up Walk In Shower;Grab Bars   Equipment Owned Front-Wheel Walker;Single Point Cane   Lives with - Patient's Self Care Capacity Spouse   Comments IPLOF, very active/independent, has acres of land with chickens, retired    Prior Level of ADL Function   Self Feeding Independent   Grooming / Hygiene Independent   Bathing Independent   Dressing Independent   Toileting Independent   Prior Level of IADL Function   Medication Management Independent   Laundry Independent   Kitchen Mobility Independent   Finances Independent   Home Management Independent   Shopping Independent   Prior Level Of Mobility  Independent Without Device in Community   Driving / Transportation Driving Independent   Occupation (Pre-Hospital Vocational) Retired Due To Age   Precautions   Comments 10lb lifting restriction   Pain   Intervention Declines   Cognition    Cognition / Consciousness WDL   Comments very pleasant/agreeable, eager to leave   Active ROM Upper Body   Active ROM Upper Body  WDL   Strength Upper Body   Upper Body Strength  WDL   Coordination Upper Body   Comments hx of PD with slight tremors   Balance Assessment   Sitting Balance (Static) Good   Sitting Balance (Dynamic) Good   Standing Balance (Static) Fair +   Standing Balance (Dynamic) Fair +   Weight Shift Sitting Good   Weight Shift Standing Good   Comments no AD   Bed Mobility    Comments approached ambulating in room   ADL Assessment   Grooming Modified Independent;Standing   Upper Body Dressing Modified Independent   Lower Body Dressing Modified Independent   Toileting Modified Independent   How much help from another person does the patient currently need...   6 Clicks Daily Activity Score 24   Functional Mobility   Sit to Stand Modified Independent   Bed, Chair, Wheelchair Transfer Modified Independent   Toilet Transfers Modified Independent   Patient / Family Goals   Patient / Family Goal #1 to go home   Education Group   Role of Occupational Therapist Patient Response Patient;Acceptance;Explanation   Additional Comments ed on ADLs with restrictions   Occupational Therapy Initial Treatment Plan    Duration Evaluation only   Problem List   Problem List None   Anticipated Discharge Equipment and Recommendations   DC Equipment Recommendations None   Discharge Recommendations Anticipate that the patient will have no further occupational therapy needs after discharge from the hospital   Interdisciplinary Plan of Care Collaboration   IDT Collaboration with  Nursing   Patient Position at End of Therapy Seated;Phone within Reach;Tray Table within Reach;Call Light within  Reach   Collaboration Comments RN aware   Session Information   Date / Session Number  5/25 OT eval only

## 2024-05-25 NOTE — PROGRESS NOTES
Trauma / Surgical Daily Progress Note    Date of Service  5/25/2024    Chief Complaint  83 y.o. male admitted 5/24/2024 with grade II renal laceration after sustaining a mechanical fall.    Interval Events  Hemoglobin stable at 13.3 (previously 13.5).  Denies hematuria.  Geriatrics consult completed yesterday.  PDI Score 4, psych consult not indicated.    - Mobilize with PT & OT for discharge recs  - Disposition: pending therapy recommendations. Anticipate medical clearance today pending therapy & geriatrics recommendations.    Review of Systems  Review of Systems   Constitutional: Negative.    HENT: Negative.     Eyes: Negative.    Respiratory:  Negative for shortness of breath.    Cardiovascular:  Negative for chest pain and palpitations.   Gastrointestinal:  Negative for abdominal pain, nausea and vomiting.   Genitourinary:  Negative for flank pain and hematuria.        Voiding, denies retention.   Musculoskeletal: Negative.    Neurological: Negative.       Vital Signs  Temp:  [36.6 °C (97.9 °F)-37.1 °C (98.7 °F)] 36.6 °C (97.9 °F)  Pulse:  [56-70] 64  Resp:  [16-18] 18  BP: (142-176)/(70-85) 146/79  SpO2:  [91 %-96 %] 96 %    Physical Exam  Physical Exam  Constitutional:       General: He is not in acute distress.     Appearance: He is not ill-appearing.   Pulmonary:      Effort: Pulmonary effort is normal. No respiratory distress.   Abdominal:      General: There is no distension.      Palpations: Abdomen is soft.      Tenderness: There is no abdominal tenderness. There is no guarding.   Musculoskeletal:      Right lower leg: No edema.      Left lower leg: No edema.   Skin:     General: Skin is warm and dry.      Capillary Refill: Capillary refill takes less than 2 seconds.      Comments: Right arm superficial abrasion   Neurological:      Mental Status: He is alert and oriented to person, place, and time. Mental status is at baseline.      GCS: GCS eye subscore is 4. GCS verbal subscore is 5. GCS motor  subscore is 6.   Psychiatric:         Behavior: Behavior normal. Behavior is cooperative.         Judgment: Judgment normal.       Laboratory  Recent Results (from the past 24 hour(s))   HEMOGLOBIN AND HEMATOCRIT    Collection Time: 05/24/24 12:01 PM   Result Value Ref Range    Hemoglobin 14.6 14.0 - 18.0 g/dL    Hematocrit 43.4 42.0 - 52.0 %   Hemoglobin - Q6 hours x4    Collection Time: 05/24/24  5:59 PM   Result Value Ref Range    Hemoglobin 13.5 (L) 14.0 - 18.0 g/dL   CBC with Differential: Tomorrow AM    Collection Time: 05/25/24 12:27 AM   Result Value Ref Range    WBC 9.1 4.8 - 10.8 K/uL    RBC 4.66 (L) 4.70 - 6.10 M/uL    Hemoglobin 13.3 (L) 14.0 - 18.0 g/dL    Hematocrit 39.7 (L) 42.0 - 52.0 %    MCV 85.2 81.4 - 97.8 fL    MCH 28.5 27.0 - 33.0 pg    MCHC 33.5 32.3 - 36.5 g/dL    RDW 41.3 35.9 - 50.0 fL    Platelet Count 180 164 - 446 K/uL    MPV 10.2 9.0 - 12.9 fL    Neutrophils-Polys 78.00 (H) 44.00 - 72.00 %    Lymphocytes 13.20 (L) 22.00 - 41.00 %    Monocytes 6.80 0.00 - 13.40 %    Eosinophils 1.40 0.00 - 6.90 %    Basophils 0.30 0.00 - 1.80 %    Immature Granulocytes 0.30 0.00 - 0.90 %    Nucleated RBC 0.00 0.00 - 0.20 /100 WBC    Neutrophils (Absolute) 7.12 1.82 - 7.42 K/uL    Lymphs (Absolute) 1.21 1.00 - 4.80 K/uL    Monos (Absolute) 0.62 0.00 - 0.85 K/uL    Eos (Absolute) 0.13 0.00 - 0.51 K/uL    Baso (Absolute) 0.03 0.00 - 0.12 K/uL    Immature Granulocytes (abs) 0.03 0.00 - 0.11 K/uL    NRBC (Absolute) 0.00 K/uL   Basic Metabolic Panel (BMP): Tomorrow AM    Collection Time: 05/25/24 12:27 AM   Result Value Ref Range    Sodium 135 135 - 145 mmol/L    Potassium 4.2 3.6 - 5.5 mmol/L    Chloride 100 96 - 112 mmol/L    Co2 23 20 - 33 mmol/L    Glucose 129 (H) 65 - 99 mg/dL    Bun 14 8 - 22 mg/dL    Creatinine 0.91 0.50 - 1.40 mg/dL    Calcium 8.8 8.5 - 10.5 mg/dL    Anion Gap 12.0 7.0 - 16.0   VITAMIN B12    Collection Time: 05/25/24 12:27 AM   Result Value Ref Range    Vitamin B12 -True Cobalamin 199  (L) 211 - 911 pg/mL   TSH    Collection Time: 05/25/24 12:27 AM   Result Value Ref Range    TSH 0.561 0.380 - 5.330 uIU/mL   HEMOGLOBIN A1C    Collection Time: 05/25/24 12:27 AM   Result Value Ref Range    Glycohemoglobin 6.0 (H) 4.0 - 5.6 %    Est Avg Glucose 126 mg/dL   ESTIMATED GFR    Collection Time: 05/25/24 12:27 AM   Result Value Ref Range    GFR (CKD-EPI) 84 >60 mL/min/1.73 m 2       Fluids  No intake or output data in the 24 hours ending 05/25/24 0730      Core Measures & Quality Metrics  Labs reviewed, Medications reviewed and Radiology images reviewed  Jesus catheter: No Jesus        DVT prophylaxis - mechanical: SCDs  Ulcer prophylaxis: Not indicated        RAP Score Total: 8    CAGE Results: negative Blood Alcohol>0.08: no     Assessment/Plan  * Trauma- (present on admission)  Assessment & Plan  Mechanical ground level fall.  Trauma Yellow Transfer Activation from [facility] in [place], [place].  Jeannie Claudio MD. Trauma Surgery.    Renal injury, right, initial encounter- (present on admission)  Assessment & Plan  Grade II anterior right renal laceration with perinephric hematoma.  Benign abdominal exam on admission.  Serial hemograms.     Aspirin long-term use- (present on admission)  Assessment & Plan  TEG with PM without significant platelet inhibition.  Aspirin held on admission.    Contraindication to deep vein thrombosis (DVT) prophylaxis- (present on admission)  Assessment & Plan  VTE prophylaxis initially contraindicated secondary to elevated bleeding risk.  5/25 Trauma surveillance venous duplex ultrasonography ordered.    Primary hypertension- (present on admission)  Assessment & Plan  No outpatient medication regimen.   PRN antihypertensives.     Encounter for geriatric assessment- (present on admission)  Assessment & Plan  5/24 Geriatric medicine consulted.    Multiple skin tears- (present on admission)  Assessment & Plan  Noted to the right lower extremity and right  elbow.   Bacitracin.     Parkinson's disease (HCC)- (present on admission)  Assessment & Plan  Chronic condition treated with carbidopa-levodopa.  Resumed maintenance medication on admission.    PAF (paroxysmal atrial fibrillation) (HCC)- (present on admission)  Assessment & Plan  Chronic condition treated with as needed flecainide. No anticoagulant use.   Resumed maintenance medication on admission.      Discussed patient condition with RN, Charge nurse / hot rounds, Patient, and trauma surgery, Dr. Claudio.

## 2024-05-25 NOTE — PROGRESS NOTES
Geriatric Medicine Daily Progress Note      Date of Service  5/25/2024    Chief Complaint  83 y.o. male admitted 5/24/2024 with Fall    Hospital Course  83 y.o. male who presented 5/24/2024 with fall resulting in right renal injury.  Patient was admitted by trauma surgery.  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  Denies history of frequent falls, states that last time he fell was more than a year ago.  Denies using any assistive devices for ambulation.  He has known history of Parkinson disease, main symptoms are tremors in both hands, denies any cognitive impairment, or urinary incontinence.  He denies any vision or hearing impairment.  States he is independent in ADLs and IADLs.  Also known history of paroxysmal atrial fibrillation, states that he only takes the flecainide as needed, and last time he took it was probably more than year ago.  Patient states that he can usually tell when he is in atrial fibrillation with his symptoms of palpitations.  His cardiologist told him that he should take aspirin 81 mg a day, but he does not really take the medication.     Interval Problem Update  Renal injury -denies pain or hematuria, hemoglobin stable  A-fib -fused cardiac monitor, currently appears to be sinus  Elev BP - sbp 142-153  Low B12    Consultants/Specialty  Trauma surgery    Code Status  Full    Disposition  Home     Review of Systems  Review of Systems   Constitutional:  Negative for chills, diaphoresis, fever and malaise/fatigue.   HENT:  Negative for congestion, hearing loss and sore throat.    Eyes:  Negative for blurred vision.   Respiratory:  Negative for cough, shortness of breath and wheezing.    Cardiovascular:  Negative for chest pain, palpitations and leg swelling.   Gastrointestinal:  Negative for abdominal pain, diarrhea, heartburn, nausea and vomiting.   Genitourinary:  Negative for dysuria, flank pain and hematuria.   Musculoskeletal:   Negative for back pain, joint pain, myalgias and neck pain.   Skin:  Negative for rash.   Neurological:  Positive for tremors. Negative for dizziness, sensory change, speech change, focal weakness, weakness and headaches.   Psychiatric/Behavioral:  The patient is not nervous/anxious.         Physical Exam  Temp:  [36.6 °C (97.9 °F)-37.1 °C (98.7 °F)] 36.6 °C (97.9 °F)  Pulse:  [64-70] 64  Resp:  [17-18] 18  BP: (142-153)/(79-85) 146/79  SpO2:  [91 %-96 %] 96 %    Physical Exam  Vitals and nursing note reviewed.   HENT:      Head: Normocephalic.      Nose: No congestion.      Mouth/Throat:      Mouth: Mucous membranes are moist.   Eyes:      Extraocular Movements: Extraocular movements intact.      Conjunctiva/sclera: Conjunctivae normal.   Cardiovascular:      Rate and Rhythm: Normal rate and regular rhythm.      Heart sounds: Murmur heard.   Pulmonary:      Effort: Pulmonary effort is normal.      Breath sounds: Normal breath sounds.   Abdominal:      General: There is no distension.      Tenderness: There is no abdominal tenderness. There is no right CVA tenderness, left CVA tenderness, guarding or rebound.   Musculoskeletal:      Cervical back: No tenderness.      Right lower leg: No edema.      Left lower leg: No edema.   Skin:     Findings: Abrasion present.      Comments: Multiple skin tears   Neurological:      General: No focal deficit present.      Mental Status: He is alert and oriented to person, place, and time.      Cranial Nerves: No cranial nerve deficit.      Motor: Tremor present.         CAM  Acute onset and fluctuating course   No   Inattention                                         No   Disorganized thinking                        No   Altered level of consciousness          No     Medication Review    Yes    Laboratory  Recent Labs     05/23/24  2130 05/24/24  0013 05/24/24  0048 05/24/24  1201 05/24/24  1759 05/25/24  0027   WBC 12.7* 13.1*  --   --   --  9.1   RBC 5.16 5.19  --   --   --   4.66*   HEMOGLOBIN 14.8 14.7   < > 14.6 13.5* 13.3*   HEMATOCRIT 45.2 45.4  --  43.4  --  39.7*   MCV 87.6 87.5  --   --   --  85.2   MCH 28.7 28.3  --   --   --  28.5   MCHC 32.7 32.4  --   --   --  33.5   RDW 42.5 42.5  --   --   --  41.3   PLATELETCT 206 213  --   --   --  180   MPV 10.2 10.6  --   --   --  10.2    < > = values in this interval not displayed.     Recent Labs     05/24/24  0013 05/24/24  0400 05/25/24  0027   SODIUM 135 136 135   POTASSIUM 5.1 5.3 4.2   CHLORIDE 100 100 100   CO2 23 25 23   GLUCOSE 130* 155* 129*   BUN 15 15 14   CREATININE 0.91 1.05 0.91   CALCIUM 9.4 9.0 8.8     Recent Labs     05/23/24  2130 05/24/24  0013   APTT 26.1 27.3   INR 0.96 1.06               Imaging  No orders to display        Assessment/Plan  * Trauma- (present on admission)  Assessment & Plan  Trauma surgery-primary service    Fall- (present on admission)  Assessment & Plan  PT and OT    Renal injury, right, initial encounter- (present on admission)  Assessment & Plan  Serial hemoglobin    Vitamin B12 deficiency- (present on admission)  Assessment & Plan  IM B12 today  Recommend to discharge with oral B12    Elevated BP without diagnosis of hypertension- (present on admission)  Assessment & Plan  IV labetalol as needed with parameters    Hyperglycemia- (present on admission)  Assessment & Plan  Hgba1c 6.0    Leukocytosis- (present on admission)  Assessment & Plan  Follow CBC    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

## 2024-05-25 NOTE — CARE PLAN
The patient is Stable - Low risk of patient condition declining or worsening    Shift Goals: H&H, Dressings, Safety  Clinical Goals: H&H, Dressings, Safety  Patient Goals: Sleep    Progress made toward(s) clinical / shift goals:  Pt resting comfortably in bed, resp status stable, taking adequate PO fluids and nutrition, and maintaining hemodynamic stability.  Pt able to effectively communicate with this RN.  This RN discussed POC and BTD with Pt, who verbalized understanding.

## 2024-05-25 NOTE — PROGRESS NOTES
Received report from previous shift RN  Assessment complete.  A&O x 4. Patient calls appropriately.  Patient ambulates without assist. Patient has 0/10 pain. Pain managed with prescribed medications.  Denies N&V. Tolerating diet.  Skin per flowsheets.  + void, + flatus, - BM.  Patient denies SOB.  SCD's on.  Patient pleasant and cooperative with plan of care.  Review plan with of care with patient. Call light and personal belongings with in reach. Hourly rounding in place. All needs met at this time.

## 2024-05-25 NOTE — DISCHARGE SUMMARY
Trauma Discharge Summary    DATE OF ADMISSION: 5/24/2024    DATE OF DISCHARGE: 5/25/2024    LENGTH OF STAY: 1 day    ATTENDING PHYSICIAN: Jeannie Claudio M.D.    CONSULTING PHYSICIAN:   1. Dr. Aravind Jimenez MD, Geriatric Medicine    DISCHARGE DIAGNOSIS:  Principal Problem:    Trauma  Active Problems:    Renal injury, right, initial encounter    Primary hypertension    Contraindication to deep vein thrombosis (DVT) prophylaxis    Aspirin long-term use    History of cancer tonsil    Leukocytosis    Hyperglycemia    Fall    Elevated BP without diagnosis of hypertension    PAF (paroxysmal atrial fibrillation) (HCC)    Parkinson's disease (HCC)    Multiple skin tears    Encounter for geriatric assessment  Resolved Problems:    * No resolved hospital problems. *    PROCEDURES: None    HISTORY OF PRESENT ILLNESS: The patient is a 83 y.o. male who was reportedly injured in after sustaining a fall after tripping on a garden hose. He was initially evaluated at [facility] and was found to have a grade II renal laceration. He was transferred to [facility] in [place], [place] for further trauma work up and evaluation.    HOSPITAL COURSE: The patient was triaged as a partial transfer activation. The referring facility imaging was reviewed and consistent with a grade II renal laceration. Trauma surgery was consulted and the patient was transported to trauma torres where he was monitored with serial hemograms. His hospital course was entirely uncomplicated. He had no hematuria and his hemoglobin remained stable. He was evaluated by PT & OT who recommended he was safe to discharge home.     On day of discharge, the patient was tolerating room air and a regular diet. He had no pain, hematuria, or urinary retention. His abdominal exam remained benign. He was discharged home with outpatient follow up as discussed below.    HOSPITAL PROBLEM LIST:  * Trauma- (present on  admission)  Assessment & Plan  Mechanical ground level fall.  Trauma Yellow Transfer Activation from [facility] in [place], [place].  Jeannie Claudio MD. Trauma Surgery.    Renal injury, right, initial encounter- (present on admission)  Assessment & Plan  Grade II anterior right renal laceration with perinephric hematoma.  Benign abdominal exam on admission.  Serial hemograms.     Aspirin long-term use- (present on admission)  Assessment & Plan  TEG with PM without significant platelet inhibition.  Aspirin held on admission.    Contraindication to deep vein thrombosis (DVT) prophylaxis- (present on admission)  Assessment & Plan  VTE prophylaxis initially contraindicated secondary to elevated bleeding risk.  5/25 Trauma surveillance venous duplex ultrasonography ordered.    Primary hypertension- (present on admission)  Assessment & Plan  No outpatient medication regimen.   PRN antihypertensives.     Encounter for geriatric assessment- (present on admission)  Assessment & Plan  5/24 Geriatric medicine consulted.    Multiple skin tears- (present on admission)  Assessment & Plan  Noted to the right lower extremity and right elbow.   Bacitracin.     Parkinson's disease ([facility])- (present on admission)  Assessment & Plan  Chronic condition treated with carbidopa-levodopa.  Resumed maintenance medication on admission.    PAF (paroxysmal atrial fibrillation) ([facility])- (present on admission)  Assessment & Plan  Chronic condition treated with as needed flecainide. No anticoagulant use.   Resumed maintenance medication on admission.      DISPOSITION: Discharged home on 5/25/24. The patient was counseled and questions were answered. Specifically, signs and symptoms of infection, respiratory decompensation, hematuria, and persistent or worsening pain were discussed and the patient agrees to seek medical attention if any of these develop.    DISCHARGE MEDICATIONS:  The patients controlled substance history was  reviewed and a controlled substance use informed consent (if applicable) was provided by [facility] and the patient has been prescribed.     Medication List        START taking these medications        Instructions   acetaminophen 325 MG Tabs  Commonly known as: Tylenol   Take 2 Tablets by mouth every four hours as needed for Moderate Pain or Mild Pain.  Dose: 650 mg     bacitracin-polymyxin b 500-17436 UNIT/GM Oint  Commonly known as: Polysporin   Apply 1 Each topically 2 times a day.  Dose: 1 Each     cyanocobalamin 1000 MCG Tabs  Start taking on: May 26, 2024  Commonly known as: Vitamin B12   Take 1 Tablet by mouth every day.  Dose: 1,000 mcg            CONTINUE taking these medications        Instructions   carbidopa-levodopa  MG Tabs  Commonly known as: Sinemet   TAKE 2 TABLETS BY MOUTH THREE TIMES DAILY FOR 90 DAYS     flecainide 150 MG Tabs  Commonly known as: Tambocor   Take 1 Tablet by mouth 1 time a day as needed (atrial fibrillation).  Dose: 150 mg              ACTIVITY: as tolerated    DIET:  Orders Placed This Encounter   Procedures    Diet Order Diet: Regular     Standing Status:   Standing     Number of Occurrences:   1     Order Specific Question:   Diet:     Answer:   Regular [1]       FOLLOW UP:  Sean GARCIA M.D.  [id number]  [facility] [id number] [phone]    Follow up  Follow up with your primary care provider to dicuss your recent hospitalization.      TIME SPENT ON DISCHARGE: 32 minutes      ____________________________________________  DAINA Echevarria    DD: 5/25/2024 8:59 AM

## 2024-05-25 NOTE — PROGRESS NOTES
Assumed care of patient at 1845. Bedside report received from Lien CURIEL. Assessment complete.  AA&Ox4. Denies CP/SOB.  Reporting 0/10 pain. Declined intervention at this time.  Educated patient regarding pharmacologic and non pharmacologic modalities for pain management.  Skin per flowsheets  Tolerating Clears diet. Denies N/V.  + void. + Flatus. Last BM PTA  Pt ambulates with SBA  All needs met at this time. Call light within reach. Pt calls appropriately. Bed low and locked, non skid socks in place. Hourly rounding in place.

## 2024-05-25 NOTE — ASSESSMENT & PLAN NOTE
CERTIFICATE OF WORK      11/19/2018      Re:   Ruchi Anthony  [address] [address]                    This is to certify that Rucih Anthony was seen at [facility] today and can return to work on 11/23/2018 for 4 hours per day. No more than 4 hours in any day or 20 hours in any week until re-evaluated in 2 weeks.                 SIGNATURE:___________________________________________,   11/19/2018      Gaurav Gutierrez MD          [facility]  [address] [address] [phone]   IM B12 today  Recommend to discharge with oral B12

## 2024-05-25 NOTE — DISCHARGE INSTRUCTIONS
- Call or seek medical attention for questions or concerns  - Follow up with the [facility] RETURN: PRN  - Follow up with primary care provider within one weeks time  - Resume regular diet  - May take over the counter acetaminophen as needed for pain  - Continue daily over the counter stool softener while on narcotics  - No operation of machinery or motorized vehicles while under the influence of narcotics  - No alcohol, marijuana or illicit drug use while under the influence of narcotics  - In the event of a narcotic overdose naloxone (Narcan) is available without a prescription from any [facility] or [facility]  - No swimming, hot tubs, baths or wound submersion until cleared by outpatient provider. May shower  - No contact sports, strenuous activities, or heavy lifting until cleared by outpatient provider  - If respiratory decompensation, persistent or worsening pain, hematuria, or signs or symptoms of infection occur seek medical attention

## 2024-07-16 ENCOUNTER — HOSPITAL ENCOUNTER (OUTPATIENT)
Dept: LAB | Facility: MEDICAL CENTER | Age: 84
End: 2024-07-16
Attending: FAMILY MEDICINE
Payer: MEDICARE

## 2024-07-16 LAB
ALBUMIN SERPL BCP-MCNC: 4.1 G/DL (ref 3.2–4.9)
ALBUMIN/GLOB SERPL: 1.4 G/DL
ALP SERPL-CCNC: 93 U/L (ref 30–99)
ALT SERPL-CCNC: <5 U/L (ref 2–50)
ANION GAP SERPL CALC-SCNC: 14 MMOL/L (ref 7–16)
AST SERPL-CCNC: 17 U/L (ref 12–45)
BASOPHILS # BLD AUTO: 0.5 % (ref 0–1.8)
BASOPHILS # BLD: 0.03 K/UL (ref 0–0.12)
BILIRUB SERPL-MCNC: 0.8 MG/DL (ref 0.1–1.5)
BUN SERPL-MCNC: 10 MG/DL (ref 8–22)
CALCIUM ALBUM COR SERPL-MCNC: 9.2 MG/DL (ref 8.5–10.5)
CALCIUM SERPL-MCNC: 9.3 MG/DL (ref 8.5–10.5)
CHLORIDE SERPL-SCNC: 101 MMOL/L (ref 96–112)
CO2 SERPL-SCNC: 24 MMOL/L (ref 20–33)
CREAT SERPL-MCNC: 0.78 MG/DL (ref 0.5–1.4)
EOSINOPHIL # BLD AUTO: 0.17 K/UL (ref 0–0.51)
EOSINOPHIL NFR BLD: 2.8 % (ref 0–6.9)
ERYTHROCYTE [DISTWIDTH] IN BLOOD BY AUTOMATED COUNT: 43 FL (ref 35.9–50)
EST. AVERAGE GLUCOSE BLD GHB EST-MCNC: 128 MG/DL
GFR SERPLBLD CREATININE-BSD FMLA CKD-EPI: 89 ML/MIN/1.73 M 2
GLOBULIN SER CALC-MCNC: 2.9 G/DL (ref 1.9–3.5)
GLUCOSE SERPL-MCNC: 119 MG/DL (ref 65–99)
HBA1C MFR BLD: 6.1 % (ref 4–5.6)
HCT VFR BLD AUTO: 43.9 % (ref 42–52)
HGB BLD-MCNC: 13.8 G/DL (ref 14–18)
IMM GRANULOCYTES # BLD AUTO: 0.01 K/UL (ref 0–0.11)
IMM GRANULOCYTES NFR BLD AUTO: 0.2 % (ref 0–0.9)
LYMPHOCYTES # BLD AUTO: 1.34 K/UL (ref 1–4.8)
LYMPHOCYTES NFR BLD: 22.1 % (ref 22–41)
MCH RBC QN AUTO: 28.1 PG (ref 27–33)
MCHC RBC AUTO-ENTMCNC: 31.4 G/DL (ref 32.3–36.5)
MCV RBC AUTO: 89.4 FL (ref 81.4–97.8)
MONOCYTES # BLD AUTO: 0.41 K/UL (ref 0–0.85)
MONOCYTES NFR BLD AUTO: 6.8 % (ref 0–13.4)
NEUTROPHILS # BLD AUTO: 4.11 K/UL (ref 1.82–7.42)
NEUTROPHILS NFR BLD: 67.6 % (ref 44–72)
NRBC # BLD AUTO: 0 K/UL
NRBC BLD-RTO: 0 /100 WBC (ref 0–0.2)
PLATELET # BLD AUTO: 221 K/UL (ref 164–446)
PMV BLD AUTO: 14.1 FL (ref 9–12.9)
POTASSIUM SERPL-SCNC: 4.6 MMOL/L (ref 3.6–5.5)
PROT SERPL-MCNC: 7 G/DL (ref 6–8.2)
PSA SERPL-MCNC: 2.53 NG/ML (ref 0–4)
RBC # BLD AUTO: 4.91 M/UL (ref 4.7–6.1)
SODIUM SERPL-SCNC: 139 MMOL/L (ref 135–145)
WBC # BLD AUTO: 6.1 K/UL (ref 4.8–10.8)

## 2024-07-16 PROCEDURE — 85025 COMPLETE CBC W/AUTO DIFF WBC: CPT

## 2024-07-16 PROCEDURE — 84153 ASSAY OF PSA TOTAL: CPT | Mod: GA

## 2024-07-16 PROCEDURE — 83036 HEMOGLOBIN GLYCOSYLATED A1C: CPT | Mod: GA

## 2024-07-16 PROCEDURE — 80053 COMPREHEN METABOLIC PANEL: CPT

## 2024-07-16 PROCEDURE — 36415 COLL VENOUS BLD VENIPUNCTURE: CPT

## 2024-09-08 ENCOUNTER — HOSPITAL ENCOUNTER (EMERGENCY)
Facility: MEDICAL CENTER | Age: 84
End: 2024-09-08
Attending: EMERGENCY MEDICINE
Payer: MEDICARE

## 2024-09-08 VITALS
DIASTOLIC BLOOD PRESSURE: 88 MMHG | SYSTOLIC BLOOD PRESSURE: 166 MMHG | BODY MASS INDEX: 27.26 KG/M2 | HEART RATE: 78 BPM | OXYGEN SATURATION: 95 % | RESPIRATION RATE: 16 BRPM | WEIGHT: 190 LBS | TEMPERATURE: 97 F

## 2024-09-08 DIAGNOSIS — I48.0 PAROXYSMAL ATRIAL FIBRILLATION (HCC): ICD-10-CM

## 2024-09-08 DIAGNOSIS — R55 SYNCOPE, UNSPECIFIED SYNCOPE TYPE: ICD-10-CM

## 2024-09-08 LAB
ALBUMIN SERPL BCP-MCNC: 4.1 G/DL (ref 3.2–4.9)
ALBUMIN/GLOB SERPL: 1.5 G/DL
ALP SERPL-CCNC: 88 U/L (ref 30–99)
ALT SERPL-CCNC: <5 U/L (ref 2–50)
ANION GAP SERPL CALC-SCNC: 9 MMOL/L (ref 7–16)
AST SERPL-CCNC: 16 U/L (ref 12–45)
BASOPHILS # BLD AUTO: 0.3 % (ref 0–1.8)
BASOPHILS # BLD: 0.02 K/UL (ref 0–0.12)
BILIRUB SERPL-MCNC: 0.9 MG/DL (ref 0.1–1.5)
BUN SERPL-MCNC: 12 MG/DL (ref 8–22)
CALCIUM ALBUM COR SERPL-MCNC: 8.7 MG/DL (ref 8.5–10.5)
CALCIUM SERPL-MCNC: 8.8 MG/DL (ref 8.4–10.2)
CHLORIDE SERPL-SCNC: 97 MMOL/L (ref 96–112)
CO2 SERPL-SCNC: 26 MMOL/L (ref 20–33)
CREAT SERPL-MCNC: 0.92 MG/DL (ref 0.5–1.4)
EKG IMPRESSION: NORMAL
EOSINOPHIL # BLD AUTO: 0.16 K/UL (ref 0–0.51)
EOSINOPHIL NFR BLD: 2.4 % (ref 0–6.9)
ERYTHROCYTE [DISTWIDTH] IN BLOOD BY AUTOMATED COUNT: 42.7 FL (ref 35.9–50)
GFR SERPLBLD CREATININE-BSD FMLA CKD-EPI: 83 ML/MIN/1.73 M 2
GLOBULIN SER CALC-MCNC: 2.8 G/DL (ref 1.9–3.5)
GLUCOSE SERPL-MCNC: 151 MG/DL (ref 65–99)
HCT VFR BLD AUTO: 41 % (ref 42–52)
HGB BLD-MCNC: 13.3 G/DL (ref 14–18)
IMM GRANULOCYTES # BLD AUTO: 0.01 K/UL (ref 0–0.11)
IMM GRANULOCYTES NFR BLD AUTO: 0.2 % (ref 0–0.9)
LYMPHOCYTES # BLD AUTO: 1.17 K/UL (ref 1–4.8)
LYMPHOCYTES NFR BLD: 17.8 % (ref 22–41)
MCH RBC QN AUTO: 28.8 PG (ref 27–33)
MCHC RBC AUTO-ENTMCNC: 32.4 G/DL (ref 32.3–36.5)
MCV RBC AUTO: 88.7 FL (ref 81.4–97.8)
MONOCYTES # BLD AUTO: 0.39 K/UL (ref 0–0.85)
MONOCYTES NFR BLD AUTO: 5.9 % (ref 0–13.4)
NEUTROPHILS # BLD AUTO: 4.82 K/UL (ref 1.82–7.42)
NEUTROPHILS NFR BLD: 73.4 % (ref 44–72)
NRBC # BLD AUTO: 0 K/UL
NRBC BLD-RTO: 0 /100 WBC (ref 0–0.2)
PLATELET # BLD AUTO: 194 K/UL (ref 164–446)
PMV BLD AUTO: 10.3 FL (ref 9–12.9)
POTASSIUM SERPL-SCNC: 4.2 MMOL/L (ref 3.6–5.5)
PROT SERPL-MCNC: 6.9 G/DL (ref 6–8.2)
RBC # BLD AUTO: 4.62 M/UL (ref 4.7–6.1)
SODIUM SERPL-SCNC: 132 MMOL/L (ref 135–145)
WBC # BLD AUTO: 6.6 K/UL (ref 4.8–10.8)

## 2024-09-08 PROCEDURE — 99285 EMERGENCY DEPT VISIT HI MDM: CPT

## 2024-09-08 PROCEDURE — 36415 COLL VENOUS BLD VENIPUNCTURE: CPT

## 2024-09-08 PROCEDURE — 96374 THER/PROPH/DIAG INJ IV PUSH: CPT

## 2024-09-08 PROCEDURE — 94799 UNLISTED PULMONARY SVC/PX: CPT

## 2024-09-08 PROCEDURE — 92960 CARDIOVERSION ELECTRIC EXT: CPT

## 2024-09-08 PROCEDURE — 85025 COMPLETE CBC W/AUTO DIFF WBC: CPT

## 2024-09-08 PROCEDURE — 80053 COMPREHEN METABOLIC PANEL: CPT

## 2024-09-08 PROCEDURE — 700111 HCHG RX REV CODE 636 W/ 250 OVERRIDE (IP): Mod: JZ | Performed by: EMERGENCY MEDICINE

## 2024-09-08 PROCEDURE — 93005 ELECTROCARDIOGRAM TRACING: CPT

## 2024-09-08 RX ORDER — ETOMIDATE 2 MG/ML
0.15 INJECTION INTRAVENOUS ONCE
Status: COMPLETED | OUTPATIENT
Start: 2024-09-08 | End: 2024-09-08

## 2024-09-08 RX ORDER — PROPOFOL 10 MG/ML
1 INJECTION, EMULSION INTRAVENOUS ONCE
Status: DISCONTINUED | OUTPATIENT
Start: 2024-09-08 | End: 2024-09-08

## 2024-09-08 RX ADMIN — ETOMIDATE 13 MG: 2 INJECTION, SOLUTION INTRAVENOUS at 09:01

## 2024-09-08 ASSESSMENT — FIBROSIS 4 INDEX: FIB4 SCORE: 2.94

## 2024-09-08 NOTE — ED NOTES
Sedation note    0859 time out completed  0901 13mg etomidate given  0901 200j synchronized shock delivered  0903 MD out of room  0905 RT out of room  0910 RN out of room. Pt back to baseline, VSS, spouse at bedside call light in reach.

## 2024-09-08 NOTE — ED TRIAGE NOTES
[facility] ems for following complaints.     Chief Complaint   Patient presents with    Syncope     Pt had 2 syncopal episodes this am. Pt woke up slightly dizzy. Went about his morning. Was sitting on couch and got up to make breakfast when he states he had syncopal episode and fell. Pt states wife called ems and helped him up and he had another episode. Pt has hx of afib. Denies blood thinners. Endorses hitting back of head with positive loc. Pt denies cp, sob or neuro deficits at this time.      BP (!) 156/99   Pulse (!) 105   Temp 36.1 °C (97 °F) (Temporal)   Resp 18   Wt 86.2 kg (190 lb)   SpO2 96%   BMI 27.26 kg/m²

## 2024-09-08 NOTE — ED NOTES
Medication history reviewed with pt and pts wife. Med rec is complete.  Allergies reviewed, per pt.  Interviewed pt with wife at bedside with permission from pt.    Patient has not had any outpatient antibiotics in the last 30 days.    Pt is not on any anticoagulants

## 2024-09-08 NOTE — ED PROVIDER NOTES
"ED Provider Note    CHIEF COMPLAINT  Chief Complaint   Patient presents with    Syncope     Pt had 2 syncopal episodes this am. Pt woke up slightly dizzy. Went about his morning. Was sitting on couch and got up to make breakfast when he states he had syncopal episode and fell. Pt states wife called ems and helped him up and he had another episode. Pt has hx of afib. Denies blood thinners. Endorses hitting back of head with positive loc. Pt denies cp, sob or neuro deficits at this time.        EXTERNAL RECORDS REVIEWED  Outpatient Notes outpatient cardiology note reviewed.  He refuses anticoagulation.  He is on aspirin.  He has declined anticoagulation in the past.  He was cardioverted in 2020 at the emergency department visit.    HPI/ROS  LIMITATION TO HISTORY   Select: : None  OUTSIDE HISTORIAN(S):  Significant other wife present at bedside    Hamlet Del Cid is a 83 y.o. male who presents for evaluation of a syncopal episode in the setting of A-fib.  He has a history of A-fib and always feels when he goes into it.  He is not on thinners because he has these episodes so infrequently the last time was a few years ago.  He says this morning he woke up around 5 and felt \"not quite right\" he went on his chores and Feeling unwell and ultimately took 150 mg of flecainide at about 7:00 in the morning.  He is still in A-fib and had 1-2 episodes of syncope/near syncope this morning he felt dizzy before and therefore presents.  Previously followed by Dr. Pickett.    PAST MEDICAL HISTORY   has a past medical history of Arthritis, Bronchitis (), Cancer ([facility]) (2003), PAF (paroxysmal atrial fibrillation) ([facility]), Parkinson disease ([facility]), Pneumonia (), and SVT (supraventricular tachycardia) ([facility]).    SURGICAL HISTORY   has a past surgical history that includes inguinal hernia repair (Left, 1990's); other cardiac surgery; hip arthroplasty total (Right, 2002); orif, fracture, femur (Left, 1961); neck dissection (2003); " "inguinal hernia repair (Bilateral, 3/31/2017); umbilical hernia repair (N/A, 3/31/2017); shoulder arthroscopy w/ rotator cuff repair (Right, ); blepharoplasty (Bilateral, 2019); and brow lift (Right, 2019).    FAMILY HISTORY  Family History   Problem Relation Age of Onset    Cancer Mother     Cancer Father     Cancer Brother        SOCIAL HISTORY  Social History     Tobacco Use    Smoking status: Former     Current packs/day: 0.00     Types: Cigarettes     Start date: 1977     Quit date: 1987     Years since quittin.7    Smokeless tobacco: Never   Vaping Use    Vaping status: Never Used   Substance and Sexual Activity    Alcohol use: Yes     Comment: occ    Drug use: No    Sexual activity: Not on file       CURRENT MEDICATIONS  Home Medications       Reviewed by Ai Maynard (Pharmacy Tech) on 24 at 0838  Med List Status: Complete     Medication Last Dose Status   acetaminophen (TYLENOL) 325 MG Tab Not Taking Active   Ascorbic Acid (VITAMIN C PO) 2024 Active   bacitracin-polymyxin b (POLYSPORIN) 500-25316 UNIT/GM Ointment Not Taking Active   carbidopa-levodopa (SINEMET)  MG Tab 2024 Active   Cholecalciferol (D3 PO) 2024 Active   cyanocobalamin (VITAMIN B12) 1000 MCG Tab 2024 Active   FEXOFENADINE HCL PO > 1 week Active   flecainide (TAMBOCOR) 150 MG Tab 2024 Active   Omega-3 Fatty Acids (FISH OIL PO) 2024 Active                    ALLERGIES  Allergies   Allergen Reactions    Cat Hair Extract Shortness of Breath, Runny Nose and Itching     \"wheezing\"       PHYSICAL EXAM  VITAL SIGNS: BP (!) 156/99   Pulse (!) 105   Temp 36.1 °C (97 °F) (Temporal)   Resp 18   Wt 86.2 kg (190 lb)   SpO2 96%   BMI 27.26 kg/m²    Constitutional: Alert in no apparent distress.  HENT: No signs of trauma, Bilateral external ears normal, Nose normal.   Eyes: Pupils are equal and reactive, Conjunctiva normal, Non-icteric.   Neck: Normal range of motion, No " tenderness, Supple, No stridor.   Cardiovascular: Irregularly irregular and tachycardic, no murmurs.   Thorax & Lungs: Normal breath sounds, No respiratory distress, No wheezing, No chest tenderness.   Abdomen: Bowel sounds normal, Soft, No tenderness, No masses, No peritoneal signs.  Skin: Warm, Dry, No erythema, No rash.   Musculoskeletal:  No major deformities noted.   Neurologic: Alert, moving all extremities without difficulty, no focal deficits.      EKG/LABS  Results for orders placed or performed during the hospital encounter of 09/08/24   CBC WITH DIFFERENTIAL   Result Value Ref Range    WBC 6.6 4.8 - 10.8 K/uL    RBC 4.62 (L) 4.70 - 6.10 M/uL    Hemoglobin 13.3 (L) 14.0 - 18.0 g/dL    Hematocrit 41.0 (L) 42.0 - 52.0 %    MCV 88.7 81.4 - 97.8 fL    MCH 28.8 27.0 - 33.0 pg    MCHC 32.4 32.3 - 36.5 g/dL    RDW 42.7 35.9 - 50.0 fL    Platelet Count 194 164 - 446 K/uL    MPV 10.3 9.0 - 12.9 fL    Neutrophils-Polys 73.40 (H) 44.00 - 72.00 %    Lymphocytes 17.80 (L) 22.00 - 41.00 %    Monocytes 5.90 0.00 - 13.40 %    Eosinophils 2.40 0.00 - 6.90 %    Basophils 0.30 0.00 - 1.80 %    Immature Granulocytes 0.20 0.00 - 0.90 %    Nucleated RBC 0.00 0.00 - 0.20 /100 WBC    Neutrophils (Absolute) 4.82 1.82 - 7.42 K/uL    Lymphs (Absolute) 1.17 1.00 - 4.80 K/uL    Monos (Absolute) 0.39 0.00 - 0.85 K/uL    Eos (Absolute) 0.16 0.00 - 0.51 K/uL    Baso (Absolute) 0.02 0.00 - 0.12 K/uL    Immature Granulocytes (abs) 0.01 0.00 - 0.11 K/uL    NRBC (Absolute) 0.00 K/uL   COMP METABOLIC PANEL   Result Value Ref Range    Sodium 132 (L) 135 - 145 mmol/L    Potassium 4.2 3.6 - 5.5 mmol/L    Chloride 97 96 - 112 mmol/L    Co2 26 20 - 33 mmol/L    Anion Gap 9.0 7.0 - 16.0    Glucose 151 (H) 65 - 99 mg/dL    Bun 12 8 - 22 mg/dL    Creatinine 0.92 0.50 - 1.40 mg/dL    Calcium 8.8 8.4 - 10.2 mg/dL    Correct Calcium 8.7 8.5 - 10.5 mg/dL    AST(SGOT) 16 12 - 45 U/L    ALT(SGPT) <5 2 - 50 U/L    Alkaline Phosphatase 88 30 - 99 U/L     Total Bilirubin 0.9 0.1 - 1.5 mg/dL    Albumin 4.1 3.2 - 4.9 g/dL    Total Protein 6.9 6.0 - 8.2 g/dL    Globulin 2.8 1.9 - 3.5 g/dL    A-G Ratio 1.5 g/dL   ESTIMATED GFR   Result Value Ref Range    GFR (CKD-EPI) 83 >60 mL/min/1.73 m 2   EKG   Result Value Ref Range    Report       [facility] Emergency Dept.    Test Date:  2024  Pt Name:    MARK JENKINS                   Department: [facility]  MRN:        [id number]                      Room:       [facility]  Gender:     Male                         Technician: [id number]  :        1940                   Requested By:ELADIA VALADEZ  Order #:    [id number]                    Reading MD: ELADIA VALADEZ    Measurements  Intervals                                Axis  Rate:       79                           P:          0  IN:         0                            QRS:        -38  QRSD:       92                           T:          41  QT:         367  QTc:        421    Interpretive Statements  NORMAL SINUS RHYTHM  RSR' in V1 or V2, right VCD or RVH  Inferior infarct, old  Compared to ECG 2024 08:14:40  AV block, advanced (high-grade) now present  Impression: Sinus rhythm no evidence of ischemia  Electronically Signed On 2024 13:59:48 PDT by ELADIA VALADEZ     EKG   Result Value Ref Range    Report       [facility] Emergency Dept.    Test Date:  2024  Pt Name:    MARK JENKINS                   Department: EDS  MRN:        [id number]                      Room:       [facility] 1  Gender:     Male                         Technician: [id number]  :        1940                   Requested By:ELADIA VALADEZ  Order #:    [id number]                    Reading MD: ELADIA VALADEZ    Measurements  Intervals                                Axis  Rate:       76                           P:          -14  IN:         187                          QRS:        -36  QRSD:       84                           T:          48  QT:          364  QTc:        410    Interpretive Statements  Sinus rhythm  Left axis deviation  Abnormal R-wave progression, early transition  Compared to ECG 2024 09:06:07  Impression: Sinus rhythm no ischemia.  Electronically Signed On 2024 14:00:00 PDT by ELADIA VALADEZ     EKG   Result Value Ref Range    Report       [facility] Emergency Dept.    Test Date:  2024  Pt Name:    MARK JENKINS                   Department: EDS  MRN:        [id number]                      [facility]  Gender:     Male                         Technician: [id number]  :        1940                   Requested By:ELADIA VALADEZ  Order #:    [id number]                    Reading MD: ELADIA VALADEZ    Measurements  Intervals                                Axis  Rate:       129                          P:          0  IL:         0                            QRS:        -29  QRSD:       160                          T:          57  QT:         313  QTc:        459    Interpretive Statements  Atrial flutter with predominant 2:1 AV block  Nonspecific intraventricular conduction delay  Artifact in lead(s) I,II,III,aVL,V1,V2,V3,V4,V5,V6  Compared to ECG 2024 09:45:04  Impression: Atrial fibrillation with RVR no evidence of ischemia.  Electronically Signed On 2024 14:05:13 PDT by STEPHENIE VALADEZ         I have independently interpreted this EKG          COURSE & MEDICAL DECISION MAKING v    Cardioversion Procedure    Indication: atrial fibrillation    Consent: The patient was counseled regarding the procedure, its indications, risks, potential complications and alternatives, and any questions were answered. Consent was obtained to proceed.    Pre-Medication: etomidate intravenously    Procedure: The patient was placed in the supine position and the chest area was exposed. The cardioversion pads were applied in the standard manner and configuration.    Attempt #1: The defibrillator was set on the  synchronous mode and charged to 200 joules.  A charge was then delivered which resulted in conversion to normal sinus rhythm.    Attempt #2: Not necessary    Attempt #3: Not necessary    The patient tolerated the procedure well.    Complications: None    Conscious Sedation Procedure Note    Indication: cardioversion    Consent: I have discussed with the patient and/or the patient representative the indication, alternatives, and the possible risks and/or complications of the planned procedure and the anesthesia methods. The patient and/or patient representative appear to understand and agree to proceed.    Physician Involvement: The attending physician was present and supervising this procedure.    Pre-Sedation Documentation and Exam: I have personally completed a history, physical exam & review of systems for this patient (see notes).    Airway Assessment: normal    Prior History of Anesthesia Complications: none    ASA Classification: Class 2 - A normal healthy patient with mild systemic disease    Sedation/ Anesthesia Plan: intravenous sedation    Medications Used: etomidate 0.15mg/kg intravenously    Monitoring and Safety: The patient was placed on a cardiac monitor and vital signs, pulse oximetry and level of consciousness were continuously evaluated throughout the procedure. The patient was closely monitored until recovery from the medications was complete and the patient had returned to baseline status. Respiratory therapy was on standby at all times during the procedure.      (The following sections must be completed)  Post-Sedation Vital Signs: Vital signs were reviewed and were stable after the procedure (see flow sheet for vitals)            Intraservice Time: 10 (Minutes)    Post-Sedation Exam: Lungs: clear           Complications: none    I provided both the sedation and procedure, a nurse was present at the bedside for the entire procedure.       ASSESSMENT, COURSE AND PLAN  Care Narrative:     Pleasant  83-year-old gentleman with history of paroxysmal A-fib not on anticoagulation but has symptomatic A-fib and can feel when he goes into it presenting with a syncopal episode in the setting of A-fib with RVR.  He felt himself in it this morning when he awoke and is positive he was not in it last night therefore I do think the symptoms are less than 24 hours.  I do think his syncopal episode was secondary to his A-fib with RVR.    Given his onset of less than 24 hours I did speak with Dr. Hamlin, cardiology who advised cardioversion and then outpatient follow-up.  Patient is agreeable to this as well.  He was cardioverted without difficulty sedated with 0.15 mg/kg of etomidate.  He was in sinus after this.  I did discuss anticoagulation with him and advised him to consider this possibility to reduce stroke risk.  He will follow-up with outpatient cardiology for further event monitoring and management.  He is agreeable to this plan he is able to ambulate without difficulty and felt significantly better he will be discharged.        DISPOSITION AND DISCUSSIONS  I have discussed management of the patient with the following physicians and HARITHA's:  Dr. Hamlin    Discussion of management with other [facility] or appropriate source(s): None     Escalation of care considered, and ultimately not performed:acute inpatient care management, however at this time, the patient is most appropriate for outpatient management    Barriers to care at this time, including but not limited to:  none .     Decision tools and prescription drugs considered including, but not limited to:  none .     The patient will return for new or worsening symptoms and is stable at the time of discharge. Patient was given return precautions. Patient and/or family member verbalizes understanding and will comply.    DISPOSITION:  Patient will be discharged home in stable condition.    FOLLOW UP:  [facility]  [facility]  [address], [address]  [facility] [address]  [phone]        [facility], Emergency Dept  [address]  [facility] [id number] [phone]    Please follow-up with cardiology.  Return to the emergency department for worsening or recurrent lightheadedness or other concerns.      OUTPATIENT MEDICATIONS:  Discharge Medication List as of 9/8/2024 10:43 AM            FINAL DIAGNOSIS  1. Paroxysmal atrial fibrillation (HCC)    2. Syncope, unspecified syncope type         Electronically signed by: Amelia Jackson M.D., 9/8/2024 8:27 AM

## 2024-09-09 NOTE — PROGRESS NOTES
Chief Complaint   Patient presents with    Atrial Fibrillation     Follow up         Subjective:   Hamlet Del Cid is a 83 y.o. male who presents today for follow-up.     Previous patient of Dr. Pickett, seen in our clinic for paroxysmal atrial fibrillation on as needed flecainide, historically has refused anticoagulation.  Last visit in our clinic with Dr. Pickett was April 2024.      He was then seen in the emergency department 9/8 for syncopal episode.  In the emergency department he was in atrial fibrillation with RVR, based on history he is expected to be in it for less than 24 hours therefore he was successfully electrically cardioverted in the emergency department.  He was discharged without anticoagulation.    Hamlet recalls waking up that morning and knowing he was in afib, he went about his normal morning chores for about 2 hours then he took 150mg of Flecainide. After that he began to feel lightheaded then about 10 minutes later he  had a transient loss of consciousness, his wife came in, helped him up then fell again, LOC x2. At that point they called EMS.     This is the first time Hamlet has passed out. Last use of Flecainide was ?2 years ago, he has afib events rarely, about once every few years.     No recent echocardiogram or stress tests. He remains active and has no exertional symptoms (when in normal rhythm). He has historically refused anticoagulation. He is interested in catheter ablations.    He had EPS and cardiac cath at [facility] in late 1980.  Past Medical History:   Diagnosis Date    Arthritis     bilateral knees    Bronchitis     Cancer ([facility]) 2003    throat Squamous Cell, with chemo/radiation, lymph nodes removal    PAF (paroxysmal atrial fibrillation) ([facility])     denies taking any meds, cardioversion last one was 2017, HX of SVT    Parkinson disease ([facility])     declines to take meds    Pneumonia     SVT (supraventricular tachycardia) ([facility])     denies taking any meds,cardioversion  "        Family History   Problem Relation Age of Onset    Cancer Mother     Cancer Father     Cancer Brother          Social History     Tobacco Use    Smoking status: Former     Current packs/day: 0.00     Types: Cigarettes     Start date: 1977     Quit date: 1987     Years since quittin.7    Smokeless tobacco: Never   Vaping Use    Vaping status: Never Used   Substance Use Topics    Alcohol use: Yes     Comment: occ    Drug use: No         Allergies   Allergen Reactions    Cat Hair Extract Shortness of Breath, Runny Nose and Itching     \"wheezing\"         Current Outpatient Medications   Medication Sig    apixaban (ELIQUIS) 5mg Tab Take 1 Tablet by mouth 2 times a day.    metoprolol tartrate (LOPRESSOR) 25 MG Tab Take 1 Tablet by mouth as needed (take 2 pills as needed for afib events).    Ascorbic Acid (VITAMIN C PO) Take 1 Tablet by mouth every day.    Cholecalciferol (D3 PO) Take 1 Capsule by mouth every day.    Omega-3 Fatty Acids (FISH OIL PO) Take 1 Capsule by mouth every day.    cyanocobalamin (VITAMIN B12) 1000 MCG Tab Take 1 Tablet by mouth every day.    carbidopa-levodopa (SINEMET)  MG Tab TAKE 2 TABLETS BY MOUTH THREE TIMES DAILY FOR 90 DAYS (Patient taking differently: Take 2 Tablets by mouth 3 times a day. TAKE 2 TABLETS BY MOUTH THREE TIMES DAILY FOR 90 DAYS, pt takes at 0700, 1200, 1800)    amoxicillin (AMOXIL) 500 MG Cap TAKE 2 CAPSULES BY MOUTH NOW AND THEN 1 EVERY 6 HOURS UNTIL GONE (Patient not taking: Reported on 9/10/2024)         Review of Systems   Constitutional:  Negative for malaise/fatigue.   Respiratory:  Negative for cough and shortness of breath.    Cardiovascular:  Positive for palpitations. Negative for chest pain, leg swelling and PND.   Neurological:  Positive for loss of consciousness.          Objective:   BP (!) 148/80 (BP Location: Left arm, Patient Position: Sitting)   Pulse 70   Resp 16   Ht 1.778 m (5' 10\")   Wt 82.6 kg (182 lb)   SpO2 100%  Body " mass index is 26.11 kg/m².         Physical Exam  Vitals reviewed.   HENT:      Head: Normocephalic and atraumatic.   Cardiovascular:      Rate and Rhythm: Normal rate and regular rhythm.      Heart sounds: No murmur heard.  Pulmonary:      Effort: Pulmonary effort is normal. No respiratory distress.      Breath sounds: No rales.   Musculoskeletal:      Right lower leg: No edema.      Left lower leg: No edema.   Neurological:      Mental Status: He is alert.      Comments: Tremulous hands   Psychiatric:         Judgment: Judgment normal.            Assessment:     1. Paroxysmal atrial fibrillation (HCC)  EKG    apixaban (ELIQUIS) 5mg Tab    metoprolol tartrate (LOPRESSOR) 25 MG Tab    EC-ECHOCARDIOGRAM COMPLETE W/O CONT    NM-CARDIAC STRESS TEST      2. Parkinson's disease, unspecified whether dyskinesia present, unspecified whether manifestations fluctuate (HCC)  EKG      3. Syncope and collapse  EC-ECHOCARDIOGRAM COMPLETE W/O CONT    NM-CARDIAC STRESS TEST           Medical Decision Making:  Today's Assessment / Plan:   Syncope  PAF  - Very concerning to have a syncopal episode after taking 150mg of Flecainide. Today he is in sinus with narrow QRS and QTc. I advised him to stop the flecainide pill in pocket as I don't think this is a safe strategy for him. He is interested in catheter ablation and I will set him up with our EP team to discuss this.   - in the meantime he can use PRN metoprolol for afib events.   - check echo and cardiac stress test (he requests an exercise test).   - he was electrically cardioverted and is high risk for CVA after this, advised him to start Eliquis 5mg bid    Return for schedule with SS or MT at next available appointment.  Sooner if problems.

## 2024-09-10 ENCOUNTER — OFFICE VISIT (OUTPATIENT)
Dept: CARDIOLOGY | Facility: MEDICAL CENTER | Age: 84
End: 2024-09-10
Attending: PHYSICIAN ASSISTANT
Payer: MEDICARE

## 2024-09-10 VITALS
DIASTOLIC BLOOD PRESSURE: 80 MMHG | HEIGHT: 70 IN | BODY MASS INDEX: 26.05 KG/M2 | HEART RATE: 70 BPM | WEIGHT: 182 LBS | RESPIRATION RATE: 16 BRPM | SYSTOLIC BLOOD PRESSURE: 148 MMHG | OXYGEN SATURATION: 100 %

## 2024-09-10 DIAGNOSIS — G20.A1 PARKINSON'S DISEASE, UNSPECIFIED WHETHER DYSKINESIA PRESENT, UNSPECIFIED WHETHER MANIFESTATIONS FLUCTUATE (HCC): ICD-10-CM

## 2024-09-10 DIAGNOSIS — R55 SYNCOPE AND COLLAPSE: ICD-10-CM

## 2024-09-10 DIAGNOSIS — I48.0 PAROXYSMAL ATRIAL FIBRILLATION (HCC): ICD-10-CM

## 2024-09-10 LAB — EKG IMPRESSION: NORMAL

## 2024-09-10 PROCEDURE — 93010 ELECTROCARDIOGRAM REPORT: CPT | Performed by: INTERNAL MEDICINE

## 2024-09-10 PROCEDURE — 3079F DIAST BP 80-89 MM HG: CPT | Performed by: PHYSICIAN ASSISTANT

## 2024-09-10 PROCEDURE — 99212 OFFICE O/P EST SF 10 MIN: CPT | Performed by: PHYSICIAN ASSISTANT

## 2024-09-10 PROCEDURE — 3077F SYST BP >= 140 MM HG: CPT | Performed by: PHYSICIAN ASSISTANT

## 2024-09-10 PROCEDURE — 93005 ELECTROCARDIOGRAM TRACING: CPT | Performed by: PHYSICIAN ASSISTANT

## 2024-09-10 PROCEDURE — 99215 OFFICE O/P EST HI 40 MIN: CPT | Performed by: PHYSICIAN ASSISTANT

## 2024-09-10 RX ORDER — AMOXICILLIN 500 MG/1
CAPSULE ORAL
COMMUNITY
Start: 2024-07-25

## 2024-09-10 RX ORDER — METOPROLOL TARTRATE 25 MG/1
25 TABLET, FILM COATED ORAL PRN
Qty: 60 TABLET | Refills: 0 | Status: SHIPPED | OUTPATIENT
Start: 2024-09-10 | End: 2024-09-11 | Stop reason: SDUPTHER

## 2024-09-10 ASSESSMENT — ENCOUNTER SYMPTOMS
LOSS OF CONSCIOUSNESS: 1
PND: 0
SHORTNESS OF BREATH: 0
PALPITATIONS: 1
COUGH: 0

## 2024-09-10 ASSESSMENT — FIBROSIS 4 INDEX: FIB4 SCORE: 3.15

## 2024-09-10 NOTE — PATIENT INSTRUCTIONS
Please start the Eliquis (blood thinner) to reduce your risk of stroke    Stop using Flecainide and if you go into AFIB you can use the Metoprolol (1-2 pills) to lower heart rate and help you convert

## 2024-09-11 ENCOUNTER — TELEPHONE (OUTPATIENT)
Dept: CARDIOLOGY | Facility: MEDICAL CENTER | Age: 84
End: 2024-09-11
Payer: MEDICARE

## 2024-09-11 DIAGNOSIS — I48.0 PAROXYSMAL ATRIAL FIBRILLATION (HCC): ICD-10-CM

## 2024-09-11 RX ORDER — METOPROLOL TARTRATE 25 MG/1
50 TABLET, FILM COATED ORAL
Qty: 60 TABLET | Refills: 0 | Status: SHIPPED | OUTPATIENT
Start: 2024-09-11 | End: 2024-09-11

## 2024-09-11 RX ORDER — METOPROLOL TARTRATE 25 MG/1
25-50 TABLET, FILM COATED ORAL
Qty: 60 TABLET | Refills: 0 | Status: SHIPPED | OUTPATIENT
Start: 2024-09-11

## 2024-09-11 NOTE — TELEPHONE ENCOUNTER
FRANK        Caller: Carmen(spouse)      Topic/issue: Patient's wife was calling about her husbands metoprolol tartrate (LOPRESSOR) 25 MG Tab medication and his pharmacy stated that they didn't have enough information to fill it like the frequency he's supposed to take it, dosages, etc. They stated to him they needed that before they could fill it and she asked for a call back      Callback Number: [phone]      Thank you    -Sean NELSON

## 2024-09-11 NOTE — TELEPHONE ENCOUNTER
Phone Number Called: [phone]    Call outcome: Spoke to patient regarding message below.    Message: Updated metoprolol prescription to reflect appropriate instructions. Spoke with pt's wife. Wife verbalized understanding.

## 2024-09-12 ENCOUNTER — TELEPHONE (OUTPATIENT)
Dept: CARDIOLOGY | Facility: MEDICAL CENTER | Age: 84
End: 2024-09-12
Payer: MEDICARE

## 2024-09-12 NOTE — TELEPHONE ENCOUNTER
Called pt and left vm on cell. Pt needs Previous w/Dr. Pelletier per FRANK some time following the testing late September. Already an EP pt-dl

## 2024-09-20 ENCOUNTER — TELEPHONE (OUTPATIENT)
Dept: CARDIOLOGY | Facility: MEDICAL CENTER | Age: 84
End: 2024-09-20

## 2024-09-27 ENCOUNTER — HOSPITAL ENCOUNTER (OUTPATIENT)
Dept: RADIOLOGY | Facility: MEDICAL CENTER | Age: 84
End: 2024-09-27
Attending: PHYSICIAN ASSISTANT
Payer: MEDICARE

## 2024-09-27 DIAGNOSIS — R55 SYNCOPE AND COLLAPSE: ICD-10-CM

## 2024-09-27 DIAGNOSIS — I48.0 PAROXYSMAL ATRIAL FIBRILLATION (HCC): ICD-10-CM

## 2024-09-27 PROCEDURE — 78452 HT MUSCLE IMAGE SPECT MULT: CPT | Mod: 26 | Performed by: INTERNAL MEDICINE

## 2024-09-27 PROCEDURE — 93018 CV STRESS TEST I&R ONLY: CPT | Performed by: INTERNAL MEDICINE

## 2024-09-27 PROCEDURE — A9502 TC99M TETROFOSMIN: HCPCS

## 2024-09-30 ENCOUNTER — ANCILLARY PROCEDURE (OUTPATIENT)
Dept: CARDIOLOGY | Facility: MEDICAL CENTER | Age: 84
End: 2024-09-30
Attending: PHYSICIAN ASSISTANT
Payer: MEDICARE

## 2024-09-30 DIAGNOSIS — I48.0 PAROXYSMAL ATRIAL FIBRILLATION (HCC): ICD-10-CM

## 2024-09-30 DIAGNOSIS — R55 SYNCOPE AND COLLAPSE: ICD-10-CM

## 2024-09-30 LAB
LV EJECT FRACT  99904: 55
LV EJECT FRACT MOD 2C 99903: 63.19
LV EJECT FRACT MOD 4C 99902: 56.82
LV EJECT FRACT MOD BP 99901: 61

## 2024-09-30 PROCEDURE — 93306 TTE W/DOPPLER COMPLETE: CPT | Mod: 26 | Performed by: INTERNAL MEDICINE

## 2024-09-30 PROCEDURE — 93306 TTE W/DOPPLER COMPLETE: CPT

## 2024-10-04 ENCOUNTER — OFFICE VISIT (OUTPATIENT)
Dept: CARDIOLOGY | Facility: MEDICAL CENTER | Age: 84
End: 2024-10-04
Attending: STUDENT IN AN ORGANIZED HEALTH CARE EDUCATION/TRAINING PROGRAM
Payer: MEDICARE

## 2024-10-04 ENCOUNTER — HOSPITAL ENCOUNTER (OUTPATIENT)
Dept: RADIOLOGY | Facility: MEDICAL CENTER | Age: 84
End: 2024-10-04
Attending: FAMILY MEDICINE
Payer: MEDICARE

## 2024-10-04 VITALS
WEIGHT: 183 LBS | HEART RATE: 68 BPM | HEIGHT: 70 IN | BODY MASS INDEX: 26.2 KG/M2 | DIASTOLIC BLOOD PRESSURE: 78 MMHG | RESPIRATION RATE: 16 BRPM | SYSTOLIC BLOOD PRESSURE: 162 MMHG | OXYGEN SATURATION: 97 %

## 2024-10-04 DIAGNOSIS — I48.0 PAF (PAROXYSMAL ATRIAL FIBRILLATION) (HCC): ICD-10-CM

## 2024-10-04 DIAGNOSIS — I10 PRIMARY HYPERTENSION: ICD-10-CM

## 2024-10-04 DIAGNOSIS — I65.29 EXTERNAL CAROTID ARTERY STENOSIS: ICD-10-CM

## 2024-10-04 PROCEDURE — 99212 OFFICE O/P EST SF 10 MIN: CPT | Performed by: STUDENT IN AN ORGANIZED HEALTH CARE EDUCATION/TRAINING PROGRAM

## 2024-10-04 PROCEDURE — 3078F DIAST BP <80 MM HG: CPT | Performed by: STUDENT IN AN ORGANIZED HEALTH CARE EDUCATION/TRAINING PROGRAM

## 2024-10-04 PROCEDURE — 99215 OFFICE O/P EST HI 40 MIN: CPT | Performed by: STUDENT IN AN ORGANIZED HEALTH CARE EDUCATION/TRAINING PROGRAM

## 2024-10-04 PROCEDURE — 93880 EXTRACRANIAL BILAT STUDY: CPT

## 2024-10-04 PROCEDURE — 3077F SYST BP >= 140 MM HG: CPT | Performed by: STUDENT IN AN ORGANIZED HEALTH CARE EDUCATION/TRAINING PROGRAM

## 2024-10-04 ASSESSMENT — FIBROSIS 4 INDEX: FIB4 SCORE: 3.15

## 2024-10-07 ENCOUNTER — APPOINTMENT (OUTPATIENT)
Dept: NEUROLOGY | Facility: MEDICAL CENTER | Age: 84
End: 2024-10-07
Attending: PSYCHIATRY & NEUROLOGY
Payer: MEDICARE

## 2024-10-07 ENCOUNTER — TELEPHONE (OUTPATIENT)
Dept: CARDIOLOGY | Facility: MEDICAL CENTER | Age: 84
End: 2024-10-07
Payer: MEDICARE

## 2024-10-07 DIAGNOSIS — I48.0 PAF (PAROXYSMAL ATRIAL FIBRILLATION) (HCC): ICD-10-CM

## 2024-10-24 ENCOUNTER — APPOINTMENT (OUTPATIENT)
Dept: ADMISSIONS | Facility: MEDICAL CENTER | Age: 84
End: 2024-10-24
Attending: STUDENT IN AN ORGANIZED HEALTH CARE EDUCATION/TRAINING PROGRAM
Payer: MEDICARE

## 2024-10-31 ENCOUNTER — PRE-ADMISSION TESTING (OUTPATIENT)
Dept: ADMISSIONS | Facility: MEDICAL CENTER | Age: 84
End: 2024-10-31
Attending: STUDENT IN AN ORGANIZED HEALTH CARE EDUCATION/TRAINING PROGRAM
Payer: MEDICARE

## 2024-11-13 ENCOUNTER — APPOINTMENT (RX ONLY)
Dept: URBAN - METROPOLITAN AREA CLINIC 35 | Facility: CLINIC | Age: 84
Setting detail: DERMATOLOGY
End: 2024-11-13

## 2024-11-13 DIAGNOSIS — D22 MELANOCYTIC NEVI: ICD-10-CM

## 2024-11-13 DIAGNOSIS — L81.4 OTHER MELANIN HYPERPIGMENTATION: ICD-10-CM

## 2024-11-13 DIAGNOSIS — Z71.89 OTHER SPECIFIED COUNSELING: ICD-10-CM

## 2024-11-13 DIAGNOSIS — L82.1 OTHER SEBORRHEIC KERATOSIS: ICD-10-CM

## 2024-11-13 PROBLEM — D22.5 MELANOCYTIC NEVI OF TRUNK: Status: ACTIVE | Noted: 2024-11-13

## 2024-11-13 PROCEDURE — ? COUNSELING

## 2024-11-13 PROCEDURE — 99213 OFFICE O/P EST LOW 20 MIN: CPT

## 2024-11-13 ASSESSMENT — LOCATION ZONE DERM
LOCATION ZONE: ARM
LOCATION ZONE: TRUNK
LOCATION ZONE: FACE

## 2024-11-13 ASSESSMENT — LOCATION DETAILED DESCRIPTION DERM
LOCATION DETAILED: RIGHT INFERIOR LATERAL FOREHEAD
LOCATION DETAILED: RIGHT POSTERIOR SHOULDER
LOCATION DETAILED: RIGHT MID-UPPER BACK
LOCATION DETAILED: RIGHT SUPERIOR LATERAL UPPER BACK
LOCATION DETAILED: RIGHT SUPERIOR UPPER BACK

## 2024-11-13 ASSESSMENT — LOCATION SIMPLE DESCRIPTION DERM
LOCATION SIMPLE: RIGHT SHOULDER
LOCATION SIMPLE: RIGHT UPPER BACK
LOCATION SIMPLE: RIGHT FOREHEAD

## 2024-11-19 ENCOUNTER — PRE-ADMISSION TESTING (OUTPATIENT)
Dept: ADMISSIONS | Facility: MEDICAL CENTER | Age: 84
End: 2024-11-19
Attending: STUDENT IN AN ORGANIZED HEALTH CARE EDUCATION/TRAINING PROGRAM
Payer: MEDICARE

## 2024-11-19 DIAGNOSIS — Z01.810 PRE-OPERATIVE CARDIOVASCULAR EXAMINATION: ICD-10-CM

## 2024-11-19 DIAGNOSIS — Z01.812 PRE-OPERATIVE LABORATORY EXAMINATION: ICD-10-CM

## 2024-11-19 LAB
ALBUMIN SERPL BCP-MCNC: 4.3 G/DL (ref 3.2–4.9)
ALBUMIN/GLOB SERPL: 1.4 G/DL
ALP SERPL-CCNC: 84 U/L (ref 30–99)
ALT SERPL-CCNC: <5 U/L (ref 2–50)
ANION GAP SERPL CALC-SCNC: 9 MMOL/L (ref 7–16)
AST SERPL-CCNC: 24 U/L (ref 12–45)
BASOPHILS # BLD AUTO: 0.7 % (ref 0–1.8)
BASOPHILS # BLD: 0.04 K/UL (ref 0–0.12)
BILIRUB SERPL-MCNC: 0.7 MG/DL (ref 0.1–1.5)
BUN SERPL-MCNC: 11 MG/DL (ref 8–22)
CALCIUM ALBUM COR SERPL-MCNC: 9.6 MG/DL (ref 8.5–10.5)
CALCIUM SERPL-MCNC: 9.8 MG/DL (ref 8.5–10.5)
CHLORIDE SERPL-SCNC: 99 MMOL/L (ref 96–112)
CO2 SERPL-SCNC: 26 MMOL/L (ref 20–33)
CREAT SERPL-MCNC: 0.97 MG/DL (ref 0.5–1.4)
EKG IMPRESSION: NORMAL
EOSINOPHIL # BLD AUTO: 0.19 K/UL (ref 0–0.51)
EOSINOPHIL NFR BLD: 3.3 % (ref 0–6.9)
ERYTHROCYTE [DISTWIDTH] IN BLOOD BY AUTOMATED COUNT: 44.2 FL (ref 35.9–50)
GFR SERPLBLD CREATININE-BSD FMLA CKD-EPI: 78 ML/MIN/1.73 M 2
GLOBULIN SER CALC-MCNC: 3 G/DL (ref 1.9–3.5)
GLUCOSE SERPL-MCNC: 120 MG/DL (ref 65–99)
HCT VFR BLD AUTO: 45.6 % (ref 42–52)
HGB BLD-MCNC: 14.5 G/DL (ref 14–18)
IMM GRANULOCYTES # BLD AUTO: 0.01 K/UL (ref 0–0.11)
IMM GRANULOCYTES NFR BLD AUTO: 0.2 % (ref 0–0.9)
INR PPP: 1.16 (ref 0.87–1.13)
LYMPHOCYTES # BLD AUTO: 1.52 K/UL (ref 1–4.8)
LYMPHOCYTES NFR BLD: 26.1 % (ref 22–41)
MCH RBC QN AUTO: 28 PG (ref 27–33)
MCHC RBC AUTO-ENTMCNC: 31.8 G/DL (ref 32.3–36.5)
MCV RBC AUTO: 88 FL (ref 81.4–97.8)
MONOCYTES # BLD AUTO: 0.42 K/UL (ref 0–0.85)
MONOCYTES NFR BLD AUTO: 7.2 % (ref 0–13.4)
NEUTROPHILS # BLD AUTO: 3.65 K/UL (ref 1.82–7.42)
NEUTROPHILS NFR BLD: 62.5 % (ref 44–72)
NRBC # BLD AUTO: 0 K/UL
NRBC BLD-RTO: 0 /100 WBC (ref 0–0.2)
PLATELET # BLD AUTO: 234 K/UL (ref 164–446)
PMV BLD AUTO: 10.8 FL (ref 9–12.9)
POTASSIUM SERPL-SCNC: 5.3 MMOL/L (ref 3.6–5.5)
PROT SERPL-MCNC: 7.3 G/DL (ref 6–8.2)
PROTHROMBIN TIME: 14.9 SEC (ref 12–14.6)
RBC # BLD AUTO: 5.18 M/UL (ref 4.7–6.1)
SODIUM SERPL-SCNC: 134 MMOL/L (ref 135–145)
WBC # BLD AUTO: 5.8 K/UL (ref 4.8–10.8)

## 2024-11-19 PROCEDURE — 80053 COMPREHEN METABOLIC PANEL: CPT

## 2024-11-19 PROCEDURE — 93005 ELECTROCARDIOGRAM TRACING: CPT

## 2024-11-19 PROCEDURE — 85025 COMPLETE CBC W/AUTO DIFF WBC: CPT

## 2024-11-19 PROCEDURE — 36415 COLL VENOUS BLD VENIPUNCTURE: CPT

## 2024-11-19 PROCEDURE — 93010 ELECTROCARDIOGRAM REPORT: CPT | Performed by: INTERNAL MEDICINE

## 2024-11-19 PROCEDURE — 85610 PROTHROMBIN TIME: CPT

## 2024-11-22 ENCOUNTER — APPOINTMENT (OUTPATIENT)
Dept: CARDIOLOGY | Facility: MEDICAL CENTER | Age: 84
End: 2024-11-22
Attending: STUDENT IN AN ORGANIZED HEALTH CARE EDUCATION/TRAINING PROGRAM
Payer: MEDICARE

## 2024-11-22 ENCOUNTER — ANESTHESIA (OUTPATIENT)
Dept: CARDIOLOGY | Facility: MEDICAL CENTER | Age: 84
End: 2024-11-22
Payer: MEDICARE

## 2024-11-22 ENCOUNTER — HOSPITAL ENCOUNTER (OUTPATIENT)
Facility: MEDICAL CENTER | Age: 84
End: 2024-11-22
Attending: STUDENT IN AN ORGANIZED HEALTH CARE EDUCATION/TRAINING PROGRAM | Admitting: STUDENT IN AN ORGANIZED HEALTH CARE EDUCATION/TRAINING PROGRAM
Payer: MEDICARE

## 2024-11-22 ENCOUNTER — ANESTHESIA EVENT (OUTPATIENT)
Dept: CARDIOLOGY | Facility: MEDICAL CENTER | Age: 84
End: 2024-11-22
Payer: MEDICARE

## 2024-11-22 VITALS
TEMPERATURE: 98 F | HEART RATE: 78 BPM | RESPIRATION RATE: 15 BRPM | HEIGHT: 70 IN | DIASTOLIC BLOOD PRESSURE: 77 MMHG | OXYGEN SATURATION: 92 % | SYSTOLIC BLOOD PRESSURE: 143 MMHG | BODY MASS INDEX: 26.1 KG/M2 | WEIGHT: 182.32 LBS

## 2024-11-22 DIAGNOSIS — I48.0 PAF (PAROXYSMAL ATRIAL FIBRILLATION) (HCC): ICD-10-CM

## 2024-11-22 LAB
ACT BLD: 262 SEC (ref 74–137)
ACT BLD: 302 SEC (ref 74–137)
ACT BLD: 320 SEC (ref 74–137)
ACT BLD: 337 SEC (ref 74–137)
ACT BLD: 377 SEC (ref 74–137)
ACT BLD: 573 SEC (ref 74–137)
EKG IMPRESSION: NORMAL

## 2024-11-22 PROCEDURE — 160046 HCHG PACU - 1ST 60 MINS PHASE II

## 2024-11-22 PROCEDURE — 93655 ICAR CATH ABLTJ DSCRT ARRHYT: CPT | Performed by: STUDENT IN AN ORGANIZED HEALTH CARE EDUCATION/TRAINING PROGRAM

## 2024-11-22 PROCEDURE — 700101 HCHG RX REV CODE 250: Performed by: ANESTHESIOLOGY

## 2024-11-22 PROCEDURE — 93010 ELECTROCARDIOGRAM REPORT: CPT | Mod: 59 | Performed by: INTERNAL MEDICINE

## 2024-11-22 PROCEDURE — 160035 HCHG PACU - 1ST 60 MINS PHASE I

## 2024-11-22 PROCEDURE — 700111 HCHG RX REV CODE 636 W/ 250 OVERRIDE (IP): Performed by: ANESTHESIOLOGY

## 2024-11-22 PROCEDURE — 700105 HCHG RX REV CODE 258: Performed by: ANESTHESIOLOGY

## 2024-11-22 PROCEDURE — A9270 NON-COVERED ITEM OR SERVICE: HCPCS | Performed by: NURSE PRACTITIONER

## 2024-11-22 PROCEDURE — 700102 HCHG RX REV CODE 250 W/ 637 OVERRIDE(OP): Performed by: NURSE PRACTITIONER

## 2024-11-22 PROCEDURE — 85347 COAGULATION TIME ACTIVATED: CPT | Mod: 91

## 2024-11-22 PROCEDURE — 93656 COMPRE EP EVAL ABLTJ ATR FIB: CPT | Performed by: STUDENT IN AN ORGANIZED HEALTH CARE EDUCATION/TRAINING PROGRAM

## 2024-11-22 PROCEDURE — C1894 INTRO/SHEATH, NON-LASER: HCPCS

## 2024-11-22 PROCEDURE — 160002 HCHG RECOVERY MINUTES (STAT)

## 2024-11-22 PROCEDURE — 700111 HCHG RX REV CODE 636 W/ 250 OVERRIDE (IP)

## 2024-11-22 PROCEDURE — 93657 TX L/R ATRIAL FIB ADDL: CPT | Performed by: STUDENT IN AN ORGANIZED HEALTH CARE EDUCATION/TRAINING PROGRAM

## 2024-11-22 PROCEDURE — 160036 HCHG PACU - EA ADDL 30 MINS PHASE I

## 2024-11-22 PROCEDURE — 700101 HCHG RX REV CODE 250

## 2024-11-22 PROCEDURE — 93005 ELECTROCARDIOGRAM TRACING: CPT | Performed by: STUDENT IN AN ORGANIZED HEALTH CARE EDUCATION/TRAINING PROGRAM

## 2024-11-22 PROCEDURE — 160047 HCHG PACU  - EA ADDL 30 MINS PHASE II

## 2024-11-22 PROCEDURE — 93325 DOPPLER ECHO COLOR FLOW MAPG: CPT

## 2024-11-22 RX ORDER — LABETALOL HYDROCHLORIDE 5 MG/ML
5 INJECTION, SOLUTION INTRAVENOUS
Status: DISCONTINUED | OUTPATIENT
Start: 2024-11-22 | End: 2024-11-22 | Stop reason: HOSPADM

## 2024-11-22 RX ORDER — OXYCODONE HCL 5 MG/5 ML
10 SOLUTION, ORAL ORAL
Status: DISCONTINUED | OUTPATIENT
Start: 2024-11-22 | End: 2024-11-22 | Stop reason: HOSPADM

## 2024-11-22 RX ORDER — DIPHENHYDRAMINE HYDROCHLORIDE 50 MG/ML
12.5 INJECTION INTRAMUSCULAR; INTRAVENOUS
Status: DISCONTINUED | OUTPATIENT
Start: 2024-11-22 | End: 2024-11-22 | Stop reason: HOSPADM

## 2024-11-22 RX ORDER — CARBIDOPA AND LEVODOPA 25; 100 MG/1; MG/1
1 TABLET ORAL ONCE
Status: COMPLETED | OUTPATIENT
Start: 2024-11-22 | End: 2024-11-22

## 2024-11-22 RX ORDER — MEPERIDINE HYDROCHLORIDE 25 MG/ML
12.5 INJECTION INTRAMUSCULAR; INTRAVENOUS; SUBCUTANEOUS
Status: DISCONTINUED | OUTPATIENT
Start: 2024-11-22 | End: 2024-11-22 | Stop reason: HOSPADM

## 2024-11-22 RX ORDER — EPHEDRINE SULFATE 50 MG/ML
5 INJECTION, SOLUTION INTRAVENOUS
Status: DISCONTINUED | OUTPATIENT
Start: 2024-11-22 | End: 2024-11-22 | Stop reason: HOSPADM

## 2024-11-22 RX ORDER — SODIUM CHLORIDE 9 MG/ML
INJECTION, SOLUTION INTRAVENOUS
Status: DISCONTINUED | OUTPATIENT
Start: 2024-11-22 | End: 2024-11-22 | Stop reason: SURG

## 2024-11-22 RX ORDER — LIDOCAINE HYDROCHLORIDE 20 MG/ML
INJECTION, SOLUTION EPIDURAL; INFILTRATION; INTRACAUDAL; PERINEURAL PRN
Status: DISCONTINUED | OUTPATIENT
Start: 2024-11-22 | End: 2024-11-22 | Stop reason: SURG

## 2024-11-22 RX ORDER — PHENYLEPHRINE HYDROCHLORIDE 10 MG/ML
INJECTION, SOLUTION INTRAMUSCULAR; INTRAVENOUS; SUBCUTANEOUS PRN
Status: DISCONTINUED | OUTPATIENT
Start: 2024-11-22 | End: 2024-11-22 | Stop reason: SURG

## 2024-11-22 RX ORDER — PROTAMINE SULFATE 10 MG/ML
INJECTION, SOLUTION INTRAVENOUS
Status: COMPLETED
Start: 2024-11-22 | End: 2024-11-22

## 2024-11-22 RX ORDER — HEPARIN SODIUM 200 [USP'U]/100ML
INJECTION, SOLUTION INTRAVENOUS
Status: COMPLETED
Start: 2024-11-22 | End: 2024-11-22

## 2024-11-22 RX ORDER — SODIUM CHLORIDE 9 MG/ML
INJECTION, SOLUTION INTRAVENOUS CONTINUOUS
Status: DISCONTINUED | OUTPATIENT
Start: 2024-11-22 | End: 2024-11-22 | Stop reason: HOSPADM

## 2024-11-22 RX ORDER — ONDANSETRON 2 MG/ML
INJECTION INTRAMUSCULAR; INTRAVENOUS PRN
Status: DISCONTINUED | OUTPATIENT
Start: 2024-11-22 | End: 2024-11-22 | Stop reason: SURG

## 2024-11-22 RX ORDER — ONDANSETRON 2 MG/ML
4 INJECTION INTRAMUSCULAR; INTRAVENOUS
Status: DISCONTINUED | OUTPATIENT
Start: 2024-11-22 | End: 2024-11-22 | Stop reason: HOSPADM

## 2024-11-22 RX ORDER — HALOPERIDOL 5 MG/ML
1 INJECTION INTRAMUSCULAR
Status: DISCONTINUED | OUTPATIENT
Start: 2024-11-22 | End: 2024-11-22 | Stop reason: HOSPADM

## 2024-11-22 RX ORDER — LIDOCAINE HYDROCHLORIDE 20 MG/ML
INJECTION, SOLUTION INFILTRATION; PERINEURAL
Status: COMPLETED
Start: 2024-11-22 | End: 2024-11-22

## 2024-11-22 RX ORDER — METOPROLOL TARTRATE 1 MG/ML
1 INJECTION, SOLUTION INTRAVENOUS
Status: DISCONTINUED | OUTPATIENT
Start: 2024-11-22 | End: 2024-11-22 | Stop reason: HOSPADM

## 2024-11-22 RX ORDER — DEXAMETHASONE SODIUM PHOSPHATE 4 MG/ML
INJECTION, SOLUTION INTRA-ARTICULAR; INTRALESIONAL; INTRAMUSCULAR; INTRAVENOUS; SOFT TISSUE PRN
Status: DISCONTINUED | OUTPATIENT
Start: 2024-11-22 | End: 2024-11-22 | Stop reason: SURG

## 2024-11-22 RX ORDER — ALBUTEROL SULFATE 5 MG/ML
2.5 SOLUTION RESPIRATORY (INHALATION)
Status: DISCONTINUED | OUTPATIENT
Start: 2024-11-22 | End: 2024-11-22 | Stop reason: HOSPADM

## 2024-11-22 RX ORDER — BUPIVACAINE HYDROCHLORIDE 5 MG/ML
INJECTION, SOLUTION EPIDURAL; INTRACAUDAL
Status: COMPLETED
Start: 2024-11-22 | End: 2024-11-22

## 2024-11-22 RX ORDER — OXYCODONE HCL 5 MG/5 ML
5 SOLUTION, ORAL ORAL
Status: DISCONTINUED | OUTPATIENT
Start: 2024-11-22 | End: 2024-11-22 | Stop reason: HOSPADM

## 2024-11-22 RX ORDER — HEPARIN SODIUM 1000 [USP'U]/ML
INJECTION, SOLUTION INTRAVENOUS; SUBCUTANEOUS
Status: COMPLETED
Start: 2024-11-22 | End: 2024-11-22

## 2024-11-22 RX ADMIN — PROPOFOL 12 MG: 10 INJECTION, EMULSION INTRAVENOUS at 07:53

## 2024-11-22 RX ADMIN — DEXAMETHASONE SODIUM PHOSPHATE 4 MG: 4 INJECTION INTRA-ARTICULAR; INTRALESIONAL; INTRAMUSCULAR; INTRAVENOUS; SOFT TISSUE at 08:22

## 2024-11-22 RX ADMIN — PHENYLEPHRINE HYDROCHLORIDE 200 MCG: 10 INJECTION INTRAVENOUS at 09:02

## 2024-11-22 RX ADMIN — FENTANYL CITRATE 250 MCG: 50 INJECTION, SOLUTION INTRAMUSCULAR; INTRAVENOUS at 07:53

## 2024-11-22 RX ADMIN — HEPARIN SODIUM 2000 UNITS: 200 INJECTION, SOLUTION INTRAVENOUS at 08:08

## 2024-11-22 RX ADMIN — ROCURONIUM BROMIDE 10 MG: 10 INJECTION, SOLUTION INTRAVENOUS at 09:55

## 2024-11-22 RX ADMIN — PROTAMINE SULFATE 50 MG: 10 INJECTION, SOLUTION INTRAVENOUS at 11:18

## 2024-11-22 RX ADMIN — HEPARIN SODIUM: 1000 INJECTION, SOLUTION INTRAVENOUS; SUBCUTANEOUS at 10:39

## 2024-11-22 RX ADMIN — PHENYLEPHRINE HYDROCHLORIDE 200 MCG: 10 INJECTION INTRAVENOUS at 10:39

## 2024-11-22 RX ADMIN — PHENYLEPHRINE HYDROCHLORIDE 200 MCG: 10 INJECTION INTRAVENOUS at 09:18

## 2024-11-22 RX ADMIN — LIDOCAINE HYDROCHLORIDE: 20 INJECTION, SOLUTION INFILTRATION; PERINEURAL at 08:04

## 2024-11-22 RX ADMIN — PHENYLEPHRINE HYDROCHLORIDE 200 MCG: 10 INJECTION INTRAVENOUS at 08:51

## 2024-11-22 RX ADMIN — PHENYLEPHRINE HYDROCHLORIDE 200 MCG: 10 INJECTION INTRAVENOUS at 08:18

## 2024-11-22 RX ADMIN — ROCURONIUM BROMIDE 10 MG: 10 INJECTION, SOLUTION INTRAVENOUS at 09:05

## 2024-11-22 RX ADMIN — ROCURONIUM BROMIDE 10 MG: 10 INJECTION, SOLUTION INTRAVENOUS at 09:34

## 2024-11-22 RX ADMIN — PHENYLEPHRINE HYDROCHLORIDE 200 MCG: 10 INJECTION INTRAVENOUS at 10:59

## 2024-11-22 RX ADMIN — ROCURONIUM BROMIDE 10 MG: 10 INJECTION, SOLUTION INTRAVENOUS at 10:50

## 2024-11-22 RX ADMIN — ROCURONIUM BROMIDE 40 MG: 10 INJECTION, SOLUTION INTRAVENOUS at 07:53

## 2024-11-22 RX ADMIN — NITROGLYCERIN 10 ML: 20 INJECTION INTRAVENOUS at 10:37

## 2024-11-22 RX ADMIN — ONDANSETRON 4 MG: 2 INJECTION INTRAMUSCULAR; INTRAVENOUS at 08:22

## 2024-11-22 RX ADMIN — BUPIVACAINE HYDROCHLORIDE: 5 INJECTION, SOLUTION EPIDURAL; INTRACAUDAL at 08:04

## 2024-11-22 RX ADMIN — HEPARIN SODIUM: 1000 INJECTION, SOLUTION INTRAVENOUS; SUBCUTANEOUS at 08:04

## 2024-11-22 RX ADMIN — PHENYLEPHRINE HYDROCHLORIDE 200 MCG: 10 INJECTION INTRAVENOUS at 08:26

## 2024-11-22 RX ADMIN — ROCURONIUM BROMIDE 10 MG: 10 INJECTION, SOLUTION INTRAVENOUS at 08:39

## 2024-11-22 RX ADMIN — PHENYLEPHRINE HYDROCHLORIDE 200 MCG: 10 INJECTION INTRAVENOUS at 08:05

## 2024-11-22 RX ADMIN — SODIUM CHLORIDE: 9 INJECTION, SOLUTION INTRAVENOUS at 07:45

## 2024-11-22 RX ADMIN — PHENYLEPHRINE HYDROCHLORIDE 200 MCG: 10 INJECTION INTRAVENOUS at 09:31

## 2024-11-22 RX ADMIN — PHENYLEPHRINE HYDROCHLORIDE 200 MCG: 10 INJECTION INTRAVENOUS at 09:53

## 2024-11-22 RX ADMIN — PHENYLEPHRINE HYDROCHLORIDE 200 MCG: 10 INJECTION INTRAVENOUS at 10:36

## 2024-11-22 RX ADMIN — GLYCOPYRROLATE 0.2 MG: 0.2 INJECTION INTRAMUSCULAR; INTRAVENOUS at 09:47

## 2024-11-22 RX ADMIN — PHENYLEPHRINE HYDROCHLORIDE 200 MCG: 10 INJECTION INTRAVENOUS at 08:35

## 2024-11-22 RX ADMIN — SUGAMMADEX 200 MG: 100 INJECTION, SOLUTION INTRAVENOUS at 11:10

## 2024-11-22 RX ADMIN — CARBIDOPA AND LEVODOPA 1 TABLET: 25; 100 TABLET ORAL at 16:10

## 2024-11-22 RX ADMIN — LIDOCAINE HYDROCHLORIDE 100 MG: 20 INJECTION, SOLUTION EPIDURAL; INFILTRATION; INTRACAUDAL; PERINEURAL at 07:53

## 2024-11-22 ASSESSMENT — PAIN SCALES - GENERAL: PAIN_LEVEL: 0

## 2024-11-22 ASSESSMENT — FIBROSIS 4 INDEX: FIB4 SCORE: 3.96

## 2024-11-22 NOTE — ANESTHESIA PROCEDURE NOTES
HASMUKH    Date/Time: 11/22/2024 7:59 AM    Performed by: Rory Willson M.D.  Authorized by: Rory Willson M.D.    Start Time:11/22/2024 7:59 AM  Preanesthetic Checklist: patient identified, IV checked, site marked, risks and benefits discussed, surgical consent, monitors and equipment checked, pre-op evaluation and timeout performed    Indication for HASMUKH: diagnostic   Patient Location: OR  Intubated: Yes  Bite Block: Yes  Heart Visualized: Yes  Insertion: easy    **See FULL HASMUKH report in patient's chart via CV [facility]**

## 2024-11-22 NOTE — ANESTHESIA PREPROCEDURE EVALUATION
Date/Time: 11/22/24 0800    Scheduled providers: Lamin Pelletier MD, PhD; Rory Willson M.D.    Procedure: CL-EP ABLATION ATRIAL FIBRILLATION    Diagnosis: PAF (paroxysmal atrial fibrillation) ([facility]) [I48.0]    Indications: See Associated Dx    Location: [facility] Cath Lab - [facility]            Relevant Problems   CARDIAC   (positive) A-fib (HCC)   (positive) Carotid artery plaque, bilateral   (positive) PAF (paroxysmal atrial fibrillation) ([facility])   (positive) Primary hypertension   (positive) SVT (supraventricular tachycardia) ([facility])         (positive) Renal injury, right, initial encounter       Physical Exam    Airway   Mallampati: II  TM distance: >3 FB  Neck ROM: full       Cardiovascular - normal exam  Rhythm: regular  Rate: normal  (-) murmur     Dental - normal exam           Pulmonary - normal exam  Breath sounds clear to auscultation     Abdominal    Neurological - normal exam                   Anesthesia Plan    ASA 3   ASA physical status 3 criteria: other (comment)    Plan - general       Airway plan will be ETT    (A fib; parkinsone\'s   jovana)      Induction: intravenous    Postoperative Plan: Postoperative administration of opioids is intended.    Pertinent diagnostic labs and testing reviewed    Informed Consent:    Anesthetic plan and risks discussed with patient.    Use of blood products discussed with: patient whom consented to blood products.

## 2024-11-22 NOTE — OR NURSING
1128 Pt over from cath lab post cardiac ablation. Right groin site is clean, dry and soft, no signs of bleeding or hematoma. Pt awakens to voice, denies pain or nausea. VSS.  1215 EKG at bedside  1230 Tolerating orals  1300 Family to bedside  1310 Dr. Pelletier at bedside  1530 4 hour bedrest complete  1535 Cathy APRN to bedside to remove suture.   1550 Pt ambulated on the unit, tolerated well. Right groin site is clean, dry and soft, no signs of bleeding or hematoma  1600 Criteria met, right groin site is clean, dry and soft, no signs of bleeding or hematoma  1610 Discharge instructions provided to pt and spouse. Discussed diet, activity, follow up, medications and symptom management. Pt and spouse state understanding. Pt and spouse state all questions have been answered. Copy of discharge provided to pt. Signed copy in chart.   1630 Pt wheeled off of unit with all belongings by RN without incident.

## 2024-11-22 NOTE — DISCHARGE INSTRUCTIONS
[facility] Post Ablation Patient Instructions:  No lifting > 10 lbs x 1 week.      No soaking in baths, hot tubs, pools x 1 week.  May shower the day after discharge and take off groin dressings and leave  sites uncovered.  Continue to monitor sites daily for warmth, redness, discolored drainage.  It is common to have a small lump in the area where the cather was (usually the size of a marble); this will go away but takes approximately 6 weeks to normalize.     3.   Please take all medications as prescribed to you; please do not stop any medications prescribed post ablation unless directed by your healthcare provider.      4.   Please do not miss any doses of your blood thinner (if you have been started on, or take chronic blood thinners) without discussion with your healthcare provider first.     5.   Please walk and take deep breaths after discharge.  After discharge, if you experience neurological changes/signs of stroke or high fever you should be seen in the emergency dept.     6.   It is possible you may experience some chest discomfort or chest tightness post ablation.  This is usually secondary to inflammation and irritation of the tissues at the area of the ablation.  If this occurs, it is advised to try 400 mg of Ibuprofen with food as needed up to three times a day for a maximum of two days.  This should help to decrease pain and tissue inflammation.          **Please notify the office ([phone]) if this occurs.         ** DO NOT TAKE Ibuprofen IF HISTORY OF ALLERGY, SIGNIFICANT BLEEDING OR KIDNEY DISEASE WITHOUT DISCUSSING WITH YOUR CARDIOLOGY PROVIDER FIRST.          ** If pain becomes severe or you have additional symptoms you may need to be medically evaluated; please contact the cardiology office ([phone]) for further direction.     7. It is possible that you may experience arrhythmia/Atrial Fibrillation post ablation.  This is secondary to irritation and  inflammation of the cardiac tissues from the ablation.  If you have atrial fibrillation all day or feel poorly with it, please notify your cardiologist's via phone ([phone]) or U[facility]hart.      8.  Please contact call our office ([phone]) or message via [employer] message if you have any questions or concerns post procedurally.    9. You need to be seen for post ablation follow up 3-4 weeks post procedure. An appointment is scheduled for you.  Please contact the office ([phone]) if you need to change your appointment.      10.  If there is bleeding at the catheter insertion site, apply pressure for 10 minutes.  If bleeding persists, call 911, and continue to hold pressure until advanced medical support arrives.     What to Expect Post Anesthesia    Rest and take it easy for the first 24 hours.  A responsible adult is recommended to remain with you during that time.  It is normal to feel sleepy.  We encourage you to not do anything that requires balance, judgment or coordination.    FOR 24 HOURS DO NOT:  Drive, operate machinery or run household appliances.  Drink beer or alcoholic beverages.  Make important decisions or sign legal documents.    To avoid nausea, slowly advance diet as tolerated, avoiding spicy or greasy foods for the first day.  Add more substantial food to your diet according to your provider's instructions.  Babies can be fed formula or breast milk as soon as they are hungry.  INCREASE FLUIDS AND FIBER TO AVOID CONSTIPATION.    MILD FLU-LIKE SYMPTOMS ARE NORMAL.  YOU MAY EXPERIENCE GENERALIZED MUSCLE ACHES, THROAT IRRITATION, HEADACHE AND/OR SOME NAUSEA.    If any questions arise, call your provider.  If your provider is not available, please feel free to call the [facility] at [phone].    MEDICATIONS: Resume taking daily medication.  Take prescribed pain medication with food.  If no medication is prescribed, you may take non-aspirin pain medication if needed.  PAIN MEDICATION  CAN BE VERY CONSTIPATING.  Take a stool softener or laxative such as senokot, pericolace, or milk of magnesia if needed.

## 2024-11-22 NOTE — ANESTHESIA PROCEDURE NOTES
Airway    Date/Time: 11/22/2024 7:56 AM    Performed by: Rory Willson M.D.  Authorized by: Rory Willson M.D.    Location:  OR  Urgency:  Elective  Indications for Airway Management:  Anesthesia      Spontaneous Ventilation: absent    Sedation Level:  Deep  Preoxygenated: Yes    Patient Position:  Sniffing  Mask Difficulty Assessment:  1 - vent by mask  Final Airway Type:  Endotracheal airway  Final Endotracheal Airway:  ETT  Cuffed: Yes    Technique Used for Successful ETT Placement:  Direct laryngoscopy    Insertion Site:  Oral  Blade Type:  Rodriguez  Laryngoscope Blade/Videolaryngoscope Blade Size:  2  ETT Size (mm):  7.0  Measured from:  Teeth  ETT to Teeth (cm):  21  Placement Verified by: auscultation and capnometry    Cormack-Lehane Classification:  Grade IIa - partial view of glottis  Number of Attempts at Approach:  1

## 2024-11-22 NOTE — ANESTHESIA POSTPROCEDURE EVALUATION
Patient: Hamlet Del Cid    Procedure Summary       Date: 11/22/24 Room / Location: [facility] Imaging - Cath Lab [facility]    Anesthesia Start: 0745 Anesthesia Stop: 1133    Procedure: CL-EP ABLATION ATRIAL FIBRILLATION Diagnosis:       PAF (paroxysmal atrial fibrillation) (HCC)      (See Associated Dx)    Scheduled Providers: Lamin Pelletier MD, PhD; Rory Willson M.D. Responsible Provider: Rory Willson M.D.    Anesthesia Type: general ASA Status: 3            Final Anesthesia Type: general  Last vitals  BP   Blood Pressure : 123/63    Temp   36.5 °C (97.7 °F)    Pulse   73   Resp   14    SpO2   96 %      Anesthesia Post Evaluation    Patient location during evaluation: PACU  Patient participation: complete - patient participated  Level of consciousness: awake and alert  Pain score: 0    Airway patency: patent  Anesthetic complications: no  Cardiovascular status: hemodynamically stable  Respiratory status: acceptable  Hydration status: euvolemic    PONV: none          No notable events documented.     Nurse Pain Score: 0 (NPRS)

## 2024-11-22 NOTE — OP REPORT
[facility]  Electrophysiology Procedure Note     Procedure date: 11/22/2024     Procedure(s) Performed:   1) Atrial fibrillation ablation  2) Ablation of additional atrial fibrillation mechanism with posterior wall isolation and mitral isthmus line ablation  3) Additional cavo tricuspid isthmus line ablation      Indication(s):  Paroxysmal atrial fibrillation      Physician(s): Lamin Pelletier M.D., PhD     Resident/Assistant(s): None     Anesthesia: General endotracheal anesthetic. Dr. Rory Willson     Specimen(s) Removed: None     Estimated Blood Loss:  40 cc     Complications:  None     Description of Procedure:   After informed written consent, the patient was brought to the EP lab in the fasting, non-sedated state. The patient was prepped and draped in the usual sterile fashion. Femoral venous access was obtained using the ultrasound guidance. In the right femoral vein, 3 sheaths (8,8,8 Fr) were inserted over 0.035” guidewires. A deflectable decapolar catheter was advanced to the CS position. Baseline rhythm SR. An intracardiac echo (ICE) catheter was advanced to the right atrium. ICE was used to identify right atrium, the atrial septum, left atrial appendage, and pulmonary veins and deana the anatomic structures to superimpose on our 3D electroanatomic map using CARTOSound. During the procedure, ICE was utilized to localize the ablation catheter, monitor for thrombus formation, and to exclude pericardial effusion. Intravenous heparin was administered to maintain -400 seconds. Transseptal left heart catheterization was performed under intracardiac echo and hemodynamic guidance using a ERUCES system. Mapping of the right atrium, pulmonary veins and left atrium was performed using CARTO3 FAM and a deflectable multipolar mapping catheter (BiosAppetizer Mobile OctaRay). RF (40W) was performed for cavo tricuspid line ablation. IV Gylcopyrrolate was given as a vagolytic. We exchanged for a Faradrive sheath. A Farawave  catheter and ICE were advanced to the LA. Eight applications per PV divided between basket and flower configurations were delivered to isolate the PVs. Two additional applications using small basket were performed into each OS of LSPV and LIPV. Additional applications in the flower configuration were delivered along the LA roof and floor to isolate the LA posterior wall as additional substrate of AF. PFA was also performed for mitral isthmus line ablation. No inducible sustained atrial arrhythmia from burst pacing from CS. At the end of the procedure, heparin was reversed with protamine, the catheter and sheaths were removed, and hemostasis was achieved by Vascade MVP for smaller access and Figure of eight for the Faradrive access. Following recovery from anesthesia, the patient was transferred to the PPU in good condition.        Total ablation time: 70 applications of Farapulse, radiofrequency 33 applications with total 904 seconds     Fluoroscopy time: 24.7 minutes, dose 40.5 mGy     Electrophysiologic Findings:    1. Sinus  1210 ms, HV 39 ms     Impressions:    1. Paroxysmal atrial fibrillation.    2. Successful PFA pulmonary vein isolation, posterior wall isolation and mitral isthmus line ablation with bidirectional block procedure.   3. Successful RF CTI line ablation with bidirectional block.      Recommendations:  1. Resume anticoagulation.  2. Transfer to monitored bedrest.  3. Bedrest for 4 hours.

## 2024-11-22 NOTE — ANESTHESIA TIME REPORT
Anesthesia Start and Stop Event Times       Date Time Event    11/22/2024 0745 Ready for Procedure     0745 Anesthesia Start     1133 Anesthesia Stop          Responsible Staff  11/22/24      Name Role Begin End    Rory Willson M.D. Anesth 0745 [id number]          Overtime Reason:  no overtime (within assigned shift)    Comments:

## 2024-11-22 NOTE — H&P
Physician H&P    Patient ID:  Hamlet Del Cid  [id number]  84 y.o. male  1940    History:  Primary Diagnosis: Paroxysmal atrial fibrillation    HPI:  This is a 84 years old gentleman with medical history of paroxysmal atrial fibrillation, hypertension, Parkinson disease came for elective atrial fibrillation/atrial flutter ablation.  He denies fever, chills, chest pain, shortness of breath, palpitations, syncope.    Past Medical History:  has a past medical history of Arthritis, Bronchitis (01/2017), Cancer (HCC) (2003), Cataract, PAF (paroxysmal atrial fibrillation) ([facility]), Parkinson disease ([facility]), Pneumonia (05/2016), and SVT (supraventricular tachycardia) ([facility]).    He has no past medical history of Acute nasopharyngitis, Anesthesia, Anginal syndrome ([facility]), Asthma, Blood clotting disorder ([facility]), Bowel habit changes, Breath shortness, Carcinoma in situ of respiratory system, Congestive heart failure ([facility]), Continuous ambulatory peritoneal dialysis status ([facility]), Coughing blood, Dental disorder, Diabetes ([facility]), Dialysis patient ([facility]), Disorder of thyroid, Emphysema of lung (HCC), Glaucoma, Gynecological disorder, Heart burn, Heart murmur, Heart valve disease, Hemorrhagic disorder ([facility]), Hepatitis A, Hepatitis B, Hepatitis C, Hiatus hernia syndrome, High cholesterol, Indigestion, Jaundice, Myocardial infarct (HCC), Pacemaker, Pain, Pregnant, Psychiatric problem, Rheumatic fever, Seizure (HCC), Sleep apnea, Snoring, Stroke ([facility]), Tuberculosis, Urinary bladder disorder, or Urinary incontinence.  Past Surgical History:  has a past surgical history that includes inguinal hernia repair (Left, 1990's); other cardiac surgery; hip arthroplasty total (Right, 2002); orif, fracture, femur (Left, 1961); neck dissection (2003); inguinal hernia repair (Bilateral, 03/31/2017); umbilical hernia repair (N/A, 03/31/2017); shoulder arthroscopy w/ rotator cuff repair (Right, 1995); blepharoplasty (Bilateral, 07/29/2019); and brow lift  (Right, 07/29/2019).  Past Social History:  reports that he quit smoking about 37 years ago. His smoking use included cigarettes. He started smoking about 47 years ago. He has a 10 pack-year smoking history. He has never used smokeless tobacco. He reports current alcohol use of about 1.2 oz of alcohol per week. He reports that he does not use drugs.  Past Family History:   Family History   Problem Relation Age of Onset    Cancer Mother     Cancer Father     Cancer Brother      Allergies: Cat hair extract    Current Medications:  Prior to Admission medications    Medication Sig Start Date End Date Taking? Authorizing Provider   apixaban (ELIQUIS) 5mg Tab Take 1 Tablet by mouth 2 times a day.  Patient taking differently: Take 5 mg by mouth 2 times a day.     MEDICATION INSTRUCTIONS FOR SURGERY ON 11/22/2024: FOLLOW INSTRUCTIONS FROM SURGEON OR SPECIALIST. 9/10/24  Yes Cathy Van P.A.-C.   carbidopa-levodopa (SINEMET)  MG Tab TAKE 2 TABLETS BY MOUTH THREE TIMES DAILY FOR 90 DAYS  Patient taking differently: Take 2 Tablets by mouth 3 times a day. TAKE 2 TABLETS BY MOUTH THREE TIMES DAILY FOR 90 DAYS, pt takes at 0700, 1200, 1800    MEDICATION INSTRUCTIONS FOR SURGERY/PROCEDURE SCHEDULED FOR 11/22/2024   CONTINUE TAKING MED PRIOR TO SURGERY 3/21/24  Yes Melissa P Bloch, M.D.   metoprolol tartrate (LOPRESSOR) 25 MG Tab Take 1-2 Tablets by mouth 1 time a day as needed (For atrial fibrillation).  Patient not taking: Reported on 10/31/2024 9/11/24   Cathy Van P.A.-C.   Ascorbic Acid (VITAMIN C PO) Take 1 Tablet by mouth every day.     MEDICATION INSTRUCTIONS FOR SURGERY/PROCEDURE SCHEDULED FOR 11/22/2024   DO NOT TAKE 7 DAYS PRIOR TO SURGERY    Physician Outpatient   Cholecalciferol (D3 PO) Take 1 Capsule by mouth every day.     MEDICATION INSTRUCTIONS FOR SURGERY/PROCEDURE SCHEDULED FOR 11/22/2024   DO NOT TAKE 7 DAYS PRIOR TO SURGERY    Physician Outpatient   Omega-3 Fatty Acids (FISH OIL PO) Take  "1 Capsule by mouth every day.     MEDICATION INSTRUCTIONS FOR SURGERY/PROCEDURE SCHEDULED FOR 11/22/2024   DO NOT TAKE 7 DAYS PRIOR TO SURGERY    Physician Outpatient   cyanocobalamin (VITAMIN B12) 1000 MCG Tab Take 1 Tablet by mouth every day.  Patient taking differently: Take 1,000 mcg by mouth every day.     EDICATION INSTRUCTIONS FOR SURGERY/PROCEDURE SCHEDULED FOR 11/22/2024   DO NOT TAKE 7 DAYS PRIOR TO SURGERY 5/26/24   Shanta Tyler, A.P.R.N.       Review of Systems:  ROS  BP (!) 161/90   Pulse 70   Temp 36.2 °C (97.1 °F) (Temporal)   Resp 20   Ht 1.778 m (5' 10\")   Wt 82.7 kg (182 lb 5.1 oz)   SpO2 96%     Physical Examination:  General, NAD, conversant  Cardiovascular, regular heartbeats, no JVD  Respiratory, clear sounds, no respiratory distress  Abdomen, soft, no tenderness  Extremities, no edema, moves all extremities    Impression:  Symptomatic paroxysmal atrial fibrillation  Parkinson's disease    Plan:  I discussed with patient the indications/benefits/risks of atrial fibrillation/atrial flutter ablation.  He understands and agrees to proceed.      Pre Procedure update:   No changes.    Lamin Pelletier MD, PhD  11/22/2024  "

## 2024-12-03 ENCOUNTER — OFFICE VISIT (OUTPATIENT)
Dept: CARDIOLOGY | Facility: MEDICAL CENTER | Age: 84
End: 2024-12-03
Attending: NURSE PRACTITIONER
Payer: MEDICARE

## 2024-12-03 VITALS
BODY MASS INDEX: 26.54 KG/M2 | WEIGHT: 185.4 LBS | HEIGHT: 70 IN | DIASTOLIC BLOOD PRESSURE: 80 MMHG | HEART RATE: 66 BPM | RESPIRATION RATE: 16 BRPM | OXYGEN SATURATION: 96 % | SYSTOLIC BLOOD PRESSURE: 130 MMHG

## 2024-12-03 DIAGNOSIS — I48.0 PAF (PAROXYSMAL ATRIAL FIBRILLATION) (HCC): ICD-10-CM

## 2024-12-03 DIAGNOSIS — Z98.890 S/P ABLATION OF ATRIAL FIBRILLATION: ICD-10-CM

## 2024-12-03 DIAGNOSIS — Z86.79 S/P ABLATION OF ATRIAL FIBRILLATION: ICD-10-CM

## 2024-12-03 LAB — EKG IMPRESSION: NORMAL

## 2024-12-03 PROCEDURE — 3075F SYST BP GE 130 - 139MM HG: CPT | Performed by: NURSE PRACTITIONER

## 2024-12-03 PROCEDURE — 93005 ELECTROCARDIOGRAM TRACING: CPT | Mod: TC | Performed by: NURSE PRACTITIONER

## 2024-12-03 PROCEDURE — 3079F DIAST BP 80-89 MM HG: CPT | Performed by: NURSE PRACTITIONER

## 2024-12-03 PROCEDURE — 99212 OFFICE O/P EST SF 10 MIN: CPT | Performed by: NURSE PRACTITIONER

## 2024-12-03 PROCEDURE — 99214 OFFICE O/P EST MOD 30 MIN: CPT | Performed by: NURSE PRACTITIONER

## 2024-12-03 ASSESSMENT — ENCOUNTER SYMPTOMS
BRUISES/BLEEDS EASILY: 0
CLAUDICATION: 0
RESPIRATORY NEGATIVE: 1
NAUSEA: 0
SHORTNESS OF BREATH: 0
NEUROLOGICAL NEGATIVE: 1
ORTHOPNEA: 0
WHEEZING: 0
SENSORY CHANGE: 0
GASTROINTESTINAL NEGATIVE: 1
DEPRESSION: 0
PALPITATIONS: 0
DIZZINESS: 0
CONSTITUTIONAL NEGATIVE: 1
MUSCULOSKELETAL NEGATIVE: 1
PND: 0
HALLUCINATIONS: 0
NERVOUS/ANXIOUS: 0
EYES NEGATIVE: 1

## 2024-12-03 ASSESSMENT — FIBROSIS 4 INDEX: FIB4 SCORE: 3.96

## 2024-12-03 NOTE — ASSESSMENT & PLAN NOTE
- no recurrence since   - no bleeding issues   - continue Eliquis for stroke protection   - has not needed any metoprolol   - safe to resume walking activities   - return in 3 months

## 2024-12-03 NOTE — PROGRESS NOTES
Atrial Fibrillation (AF) Follow up Note    DOS: 12/3/2024  [id number]  [place]    Chief complaint/Reason for consult: post ablation    HPI: Pt is a 84 y.o. male who presents to the clinic today in follow up for post op ablation. Patient has a past medical history significant for but not limited to: Parkinson's, atrial fibrillation, falls. Patient underwent ablation approximately 2 weeks ago with targeted sites of isolation pulmonary veins, posterior wall and mitral isthmus line via pulse field catheter and cavo-tricuspid isthmus line via radiofrequency catheter. Patient throat and groin healed well. Discussed procedure, recovery and lifestyle modifiers. Patient want sot resume walking activities. Did briefly discuss long term OAC. Patient CHADS2 score 2, could consider Watchman if worry for frequent falls with progressing Parkinson.       Past Medical History:   Diagnosis Date    Arthritis     bilateral knees    Bronchitis 01/2017    Cancer ([facility]) 2003    throat Squamous Cell, with chemo/radiation, lymph nodes removal    Cataract     bilateral IOL    PAF (paroxysmal atrial fibrillation) ([facility])     denies taking any meds, cardioversion last one was 2017, HX of SVT    Parkinson disease ([facility])     declines to take meds    Pneumonia 05/2016    SVT (supraventricular tachycardia) ([facility])     denies taking any meds,cardioversion       Past Surgical History:   Procedure Laterality Date    BLEPHAROPLASTY Bilateral 07/29/2019    Procedure: BLEPHAROPLASTY- UPPER;  Surgeon: Jos Baron M.D.;  Location: SURGERY [facility];  Service: Plastics    BROW LIFT Right 07/29/2019    Procedure: RHYTIDECTOMY, FOREHEAD- DIRECT BROW LIFT;  Surgeon: Jos Baron M.D.;  Location: SURGERY [facility];  Service: Plastics    INGUINAL HERNIA REPAIR Bilateral 03/31/2017    Procedure: INGUINAL HERNIA REPAIR W/MESH;  Surgeon: Michael Malin M.D.;  Location: SURGERY SAME DAY [facility];  Service:     UMBILICAL HERNIA REPAIR N/A  2017    Procedure: UMBILICAL HERNIA REPAIR W/MESH;  Surgeon: Michael Malin M.D.;  Location: SURGERY SAME DAY [facility];  Service:     NECK DISSECTION      HIP ARTHROPLASTY TOTAL Right     Hip Replacement, Total    SHOULDER ARTHROSCOPY W/ ROTATOR CUFF REPAIR Right     ORIF, FRACTURE, FEMUR Left     ORIF, Femur    INGUINAL HERNIA REPAIR Left     OTHER CARDIAC SURGERY      reports has had 6 cardioversions       Social History     Socioeconomic History    Marital status:      Spouse name: Not on file    Number of children: Not on file    Years of education: Not on file    Highest education level: Not on file   Occupational History    Not on file   Tobacco Use    Smoking status: Former     Current packs/day: 0.00     Average packs/day: 1 pack/day for 10.0 years (10.0 ttl pk-yrs)     Types: Cigarettes     Start date: 1977     Quit date: 1987     Years since quittin.9    Smokeless tobacco: Never   Vaping Use    Vaping status: Never Used   Substance and Sexual Activity    Alcohol use: Yes     Alcohol/week: 1.2 oz     Types: 2 Glasses of wine per week     Comment: occ    Drug use: Never    Sexual activity: Not on file   Other Topics Concern    Not on file   Social History Narrative    Not on file     Social Drivers of Health     Financial Resource Strain: Not on file   Food Insecurity: No Food Insecurity (2024)    Hunger Vital Sign     Worried About Running Out of Food in the Last Year: Never true     Ran Out of Food in the Last Year: Never true   Transportation Needs: No Transportation Needs (2024)    PRAPARE - Transportation     Lack of Transportation (Medical): No     Lack of Transportation (Non-Medical): No   Physical Activity: Not on file   Stress: Not on file   Social Connections: Not on file   Intimate Partner Violence: Not At Risk (2024)    Humiliation, Afraid, Rape, and Kick questionnaire     Fear of Current or Ex-Partner: No     Emotionally Abused:  "No     Physically Abused: No     Sexually Abused: No   Housing Stability: Low Risk  (5/24/2024)    Housing Stability Vital Sign     Unable to Pay for Housing in the Last Year: No     Number of Places Lived in the Last Year: 1     Unstable Housing in the Last Year: No       Family History   Problem Relation Age of Onset    Cancer Mother     Cancer Father     Cancer Brother        Allergies   Allergen Reactions    Cat Hair Extract Shortness of Breath, Runny Nose and Itching     \"wheezing\"       Current Outpatient Medications   Medication Sig Dispense Refill    metoprolol tartrate (LOPRESSOR) 25 MG Tab Take 1-2 Tablets by mouth 1 time a day as needed (For atrial fibrillation). 60 Tablet 0    apixaban (ELIQUIS) 5mg Tab Take 1 Tablet by mouth 2 times a day. 60 Tablet 3    Ascorbic Acid (VITAMIN C PO) Take 1 Tablet by mouth every day.     MEDICATION INSTRUCTIONS FOR SURGERY/PROCEDURE SCHEDULED FOR 11/22/2024   DO NOT TAKE 7 DAYS PRIOR TO SURGERY      Cholecalciferol (D3 PO) Take 1 Capsule by mouth every day.     MEDICATION INSTRUCTIONS FOR SURGERY/PROCEDURE SCHEDULED FOR 11/22/2024   DO NOT TAKE 7 DAYS PRIOR TO SURGERY      Omega-3 Fatty Acids (FISH OIL PO) Take 1 Capsule by mouth every day.     MEDICATION INSTRUCTIONS FOR SURGERY/PROCEDURE SCHEDULED FOR 11/22/2024   DO NOT TAKE 7 DAYS PRIOR TO SURGERY      cyanocobalamin (VITAMIN B12) 1000 MCG Tab Take 1 Tablet by mouth every day.      carbidopa-levodopa (SINEMET)  MG Tab TAKE 2 TABLETS BY MOUTH THREE TIMES DAILY FOR 90 DAYS (Patient taking differently: Take 2 Tablets by mouth 3 times a day. TAKE 2 TABLETS BY MOUTH THREE TIMES DAILY FOR 90 DAYS, pt takes at 0700, 1200, 1800    MEDICATION INSTRUCTIONS FOR SURGERY/PROCEDURE SCHEDULED FOR 11/22/2024   CONTINUE TAKING MED PRIOR TO SURGERY) 540 Tablet 3     No current facility-administered medications for this visit.       Vitals:    12/03/24 0911   BP: 130/80   Pulse: 66   Resp: 16   SpO2: 96%         Review of Systems "   Constitutional: Negative.  Negative for malaise/fatigue.   HENT: Negative.     Eyes: Negative.    Respiratory: Negative.  Negative for shortness of breath and wheezing.    Cardiovascular:  Negative for chest pain, palpitations, orthopnea, claudication, leg swelling and PND.   Gastrointestinal: Negative.  Negative for nausea.   Genitourinary: Negative.    Musculoskeletal: Negative.    Skin: Negative.    Neurological: Negative.  Negative for dizziness and sensory change.   Endo/Heme/Allergies: Negative.  Does not bruise/bleed easily.   Psychiatric/Behavioral:  Negative for depression and hallucinations. The patient is not nervous/anxious.           EKG interpreted by me: Sinus    Physical Exam  Constitutional:       Appearance: Normal appearance.   HENT:      Head: Normocephalic.   Eyes:      Pupils: Pupils are equal, round, and reactive to light.   Neck:      Vascular: No JVD.   Cardiovascular:      Rate and Rhythm: Normal rate and regular rhythm.      Pulses: Normal pulses.      Heart sounds: Normal heart sounds.   Pulmonary:      Effort: Pulmonary effort is normal.      Breath sounds: Normal breath sounds.   Abdominal:      General: Abdomen is flat.      Palpations: Abdomen is soft.   Musculoskeletal:      Cervical back: Normal range of motion.      Right lower leg: No edema.      Left lower leg: No edema.   Skin:     General: Skin is warm and dry.   Neurological:      Mental Status: He is alert and oriented to person, place, and time.      Motor: Tremor present.   Psychiatric:         Mood and Affect: Mood normal.         Behavior: Behavior normal.          Data:  Lipids:   Lab Results   Component Value Date/Time    CHOLSTRLTOT 181 04/13/2012 10:34 AM    TRIGLYCERIDE 94 04/13/2012 10:34 AM    HDL 56 04/13/2012 10:34 AM     (H) 04/13/2012 10:34 AM        BMP:  Lab Results   Component Value Date/Time    SODIUM 134 (L) 11/19/2024 0925    POTASSIUM 5.3 11/19/2024 0925    CHLORIDE 99 11/19/2024 0925    CO2 26  "11/19/2024 0925    GLUCOSE 120 (H) 11/19/2024 0925    BUN 11 11/19/2024 0925    CREATININE 0.97 11/19/2024 0925    CALCIUM 9.8 11/19/2024 0925    ANION 9.0 11/19/2024 0925       GFR:  Lab Results   Component Value Date/Time    IFAFRICA 87 10/12/2021 0803    IFNOTAFR 75 10/12/2021 0803        TSH:   Lab Results   Component Value Date/Time    TSHULTRASEN 0.561 05/25/2024 0027       MAGNESIUM:  Lab Results   Component Value Date/Time    MAGNESIUM 2.2 10/27/2023 2305    MAGNESIUM 2.2 05/14/2020 2037    MAGNESIUM 2.1 01/03/2020 0936        THYROXINE (T4):   No results found for: \"FREEDIR\"     CBC:   Lab Results   Component Value Date/Time    WBC 5.8 11/19/2024 09:25 AM    RBC 5.18 11/19/2024 09:25 AM    HEMOGLOBIN 14.5 11/19/2024 09:25 AM    HEMATOCRIT 45.6 11/19/2024 09:25 AM    MCV 88.0 11/19/2024 09:25 AM    MCH 28.0 11/19/2024 09:25 AM    MCHC 31.8 (L) 11/19/2024 09:25 AM    RDW 44.2 11/19/2024 09:25 AM    PLATELETCT 234 11/19/2024 09:25 AM    MPV 10.8 11/19/2024 09:25 AM    NEUTSPOLYS 62.50 11/19/2024 09:25 AM    LYMPHOCYTES 26.10 11/19/2024 09:25 AM    MONOCYTES 7.20 11/19/2024 09:25 AM    EOSINOPHILS 3.30 11/19/2024 09:25 AM    BASOPHILS 0.70 11/19/2024 09:25 AM    IMMGRAN 0.20 11/19/2024 09:25 AM    NRBC 0.00 11/19/2024 09:25 AM    NEUTS 3.65 11/19/2024 09:25 AM    LYMPHS 1.52 11/19/2024 09:25 AM    MONOS 0.42 11/19/2024 09:25 AM    EOS 0.19 11/19/2024 09:25 AM    BASO 0.04 11/19/2024 09:25 AM    IMMGRANAB 0.01 11/19/2024 09:25 AM    NRBCAB 0.00 11/19/2024 09:25 AM        CBC w/o DIFF  Lab Results   Component Value Date/Time    WBC 5.8 11/19/2024 09:25 AM    RBC 5.18 11/19/2024 09:25 AM    HEMOGLOBIN 14.5 11/19/2024 09:25 AM    MCV 88.0 11/19/2024 09:25 AM    MCH 28.0 11/19/2024 09:25 AM    MCHC 31.8 (L) 11/19/2024 09:25 AM    RDW 44.2 11/19/2024 09:25 AM    MPV 10.8 11/19/2024 09:25 AM       LIVER:  Lab Results   Component Value Date/Time    ALKPHOSPHAT 84 11/19/2024 09:25 AM    ASTSGOT 24 11/19/2024 09:25 AM    " ALTSGPT <5 11/19/2024 09:25 AM    TBILIRUBIN 0.7 11/19/2024 09:25 AM       BNP:  Lab Results   Component Value Date/Time    BNPBTYPENAT 33 08/09/2013 09:30 AM       PT/INR:  Lab Results   Component Value Date/Time    PROTHROMBTM 14.9 (H) 11/19/2024 09:25 AM    PROTHROMBTM 14.0 05/24/2024 12:13 AM    PROTHROMBTM 13.2 05/23/2024 09:30 PM    INR 1.16 (H) 11/19/2024 09:25 AM    INR 1.06 05/24/2024 12:13 AM    INR 0.96 05/23/2024 09:30 PM             Impression/Plan:  S/P ablation of atrial fibrillation   - no recurrence since   - no bleeding issues   - continue Eliquis for stroke protection   - has not needed any metoprolol   - safe to resume walking activities   - return in 3 months        Lifestyle Modification for better heart health care as well as beneficial for prevention of worsening the atrial arrhythmia progression. Lifestyle modification discussed includes diet and reduction of sodium. Patients should eat more of a Mediterranean diet and be very careful with how much processed and fast food they eat. Excessive sodium intake can result in fluid retention which can add additional strain to patients cardiac electrical system. Weight loss can also help long term with cardiac health. For patients with BMI above 25kg/m2, studies have shown a greater chance of progression of disease as well as recurrence after interventions. ARREST-AF study showed less recurrence of AF and slower disease progression in patients with BMI below 27kg/m2. Smoking and vaping should be stopped. Moderation on caffeine and alcohol. Excessive alcohol or caffeine can result in reactions and antagonize the hearts electrical system. Patient that fit the matrix for sleep apnea should be referred for a formal study and should try to be compliant with treatment for sleep apnea. Stay hydrated and stay active. Studies have shown that staying physically active can help heart health. The CARDIO-FIT study showed sedentary individuals lead to faster time  of recurrence of arrhythmia. Exercise goals should be trying to maintain 120-200 minutes per week of exercise.      A total of 30 minutes of time was spent on day of encounter reviewing medical record, performing history and examination, counseling, ordering medication/test/consults, collaborating with referring service, and documentation.    [facility]  Cardiac Electrophysiology

## 2024-12-09 ENCOUNTER — OFFICE VISIT (OUTPATIENT)
Dept: NEUROLOGY | Facility: MEDICAL CENTER | Age: 84
End: 2024-12-09
Attending: PSYCHIATRY & NEUROLOGY
Payer: MEDICARE

## 2024-12-09 VITALS
DIASTOLIC BLOOD PRESSURE: 58 MMHG | SYSTOLIC BLOOD PRESSURE: 118 MMHG | WEIGHT: 186.29 LBS | HEART RATE: 67 BPM | HEIGHT: 70 IN | BODY MASS INDEX: 26.67 KG/M2 | OXYGEN SATURATION: 96 % | TEMPERATURE: 97 F

## 2024-12-09 DIAGNOSIS — E53.8 VITAMIN B12 DEFICIENCY: ICD-10-CM
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
## 2024-12-09 DIAGNOSIS — Z72.0 TOBACCO USE: ICD-10-CM

## 2024-12-09 DIAGNOSIS — G43.109 OCULAR MIGRAINE: ICD-10-CM

## 2024-12-09 DIAGNOSIS — Z91.81 RISK FOR FALLS: ICD-10-CM

## 2024-12-09 DIAGNOSIS — G20.A1 PARKINSON'S DISEASE, UNSPECIFIED WHETHER DYSKINESIA PRESENT, UNSPECIFIED WHETHER MANIFESTATIONS FLUCTUATE (HCC): ICD-10-CM

## 2024-12-09 PROCEDURE — 99212 OFFICE O/P EST SF 10 MIN: CPT | Performed by: PSYCHIATRY & NEUROLOGY

## 2024-12-09 PROCEDURE — 3078F DIAST BP <80 MM HG: CPT | Performed by: PSYCHIATRY & NEUROLOGY

## 2024-12-09 PROCEDURE — 3074F SYST BP LT 130 MM HG: CPT | Performed by: PSYCHIATRY & NEUROLOGY

## 2024-12-09 PROCEDURE — 99215 OFFICE O/P EST HI 40 MIN: CPT | Performed by: PSYCHIATRY & NEUROLOGY

## 2024-12-09 ASSESSMENT — PATIENT HEALTH QUESTIONNAIRE - PHQ9: CLINICAL INTERPRETATION OF PHQ2 SCORE: 0

## 2024-12-09 ASSESSMENT — FIBROSIS 4 INDEX: FIB4 SCORE: 3.96

## 2024-12-09 NOTE — ASSESSMENT & PLAN NOTE
Pt states that he feel once in the summer when he tripped over a garden house. Pt states that he cannot recover when he gets off balance.

## 2024-12-09 NOTE — ASSESSMENT & PLAN NOTE
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 Pt states he has had cataract surgery and now his eyes are dryer than they used to be and he uses eye drops.

## 2024-12-09 NOTE — PROGRESS NOTES
Chief Complaint   Patient presents with    Follow-Up       Problem List Items Addressed This Visit       Parkinson's disease (HCC)     Pt does work or go the gym everyday. Pt states that if he misses medication he notices he can tell that the tremors increase and are slightly worse on the right side. Patient states his writing has changed. Pt states the finer detail is out the window. Pt states that he feels like things are slowly getting worse. Pt states he has trouble with dry fibrous food. Pt states that he feels like his voice and weak and scratchy. Pt states that he has had some issues from previous throat cancer from 20 years ago. Pt states that he takes it 10 minutes before his mealtime except the last dose he takes later in the evening. He takes it at 6 am,12 pm and then at 9pm and he goes to bed at 10pm. He gets up to use the bathroom at night and he is shaky but he manages.Pt states he has mild name finding issues and short term memory loss at times but this has not been a rapid progression of his symptoms.         Relevant Orders    Patient identified as fall risk.  Appropriate orders and counseling given.    Fall     Pt states that he feel once in the summer when he tripped over a garden house. Pt states that he cannot recover when he gets off balance.         Relevant Orders    Patient identified as fall risk.  Appropriate orders and counseling given.    Vitamin B12 deficiency     Pt is taking his vitamin D supplements per his report.         Risk for falls    Relevant Orders    Patient identified as fall risk.  Appropriate orders and counseling given.    Ocular migraine     Pt had migraine as a younger period and now he has just ocular migraine. Pt states he has had cataract surgery and now his eyes are dryer than they used to be and he uses eye drops.          Other Visit Diagnoses       Tobacco use                History of present illness:  Hamlet Del Cid 84 y.o. male presents today for for  worsening of his Parkinson's disease slowly over the last year and a half since I have seen him.  Patient has had some other health conditions including cardiac and trauma from a fall.    Past medical history:   Past Medical History:   Diagnosis Date    Arthritis     bilateral knees    Bronchitis 01/2017    Cancer ([facility]) 2003    throat Squamous Cell, with chemo/radiation, lymph nodes removal    Cataract     bilateral IOL    PAF (paroxysmal atrial fibrillation) ([facility])     denies taking any meds, cardioversion last one was 2017, HX of SVT    Parkinson disease ([facility])     declines to take meds    Pneumonia 05/2016    SVT (supraventricular tachycardia) ([facility])     denies taking any meds,cardioversion       Past surgical history:   Past Surgical History:   Procedure Laterality Date    BLEPHAROPLASTY Bilateral 07/29/2019    Procedure: BLEPHAROPLASTY- UPPER;  Surgeon: Jos Baron M.D.;  Location: SURGERY [facility];  Service: Plastics    BROW LIFT Right 07/29/2019    Procedure: RHYTIDECTOMY, FOREHEAD- DIRECT BROW LIFT;  Surgeon: Jos Baron M.D.;  Location: SURGERY [facility];  Service: Plastics    INGUINAL HERNIA REPAIR Bilateral 03/31/2017    Procedure: INGUINAL HERNIA REPAIR W/MESH;  Surgeon: Michael Malin M.D.;  Location: SURGERY SAME DAY [facility];  Service:     UMBILICAL HERNIA REPAIR N/A 03/31/2017    Procedure: UMBILICAL HERNIA REPAIR W/MESH;  Surgeon: Michael Malin M.D.;  Location: SURGERY SAME DAY [facility];  Service:     NECK DISSECTION  2003    HIP ARTHROPLASTY TOTAL Right 2002    Hip Replacement, Total    SHOULDER ARTHROSCOPY W/ ROTATOR CUFF REPAIR Right 1995    ORIF, FRACTURE, FEMUR Left 1961    ORIF, Femur    INGUINAL HERNIA REPAIR Left 1990's    OTHER CARDIAC SURGERY      reports has had 6 cardioversions       Family history:   Family History   Problem Relation Age of Onset    Cancer Mother     Cancer Father     Cancer Brother        Social history:   Social History     Socioeconomic  History    Marital status:      Spouse name: Not on file    Number of children: Not on file    Years of education: Not on file    Highest education level: Not on file   Occupational History    Not on file   Tobacco Use    Smoking status: Former     Current packs/day: 0.00     Average packs/day: 1 pack/day for 10.0 years (10.0 ttl pk-yrs)     Types: Cigarettes     Start date: 1977     Quit date: 1987     Years since quittin.9    Smokeless tobacco: Never   Vaping Use    Vaping status: Never Used   Substance and Sexual Activity    Alcohol use: Yes     Alcohol/week: 1.2 oz     Types: 2 Glasses of wine per week     Comment: occ    Drug use: Never    Sexual activity: Not on file   Other Topics Concern    Not on file   Social History Narrative    Not on file     Social Drivers of Health     Financial Resource Strain: Not on file   Food Insecurity: No Food Insecurity (2024)    Hunger Vital Sign     Worried About Running Out of Food in the Last Year: Never true     Ran Out of Food in the Last Year: Never true   Transportation Needs: No Transportation Needs (2024)    PRAPARE - Transportation     Lack of Transportation (Medical): No     Lack of Transportation (Non-Medical): No   Physical Activity: Not on file   Stress: Not on file   Social Connections: Not on file   Intimate Partner Violence: Not At Risk (2024)    Humiliation, Afraid, Rape, and Kick questionnaire     Fear of Current or Ex-Partner: No     Emotionally Abused: No     Physically Abused: No     Sexually Abused: No   Housing Stability: Low Risk  (2024)    Housing Stability Vital Sign     Unable to Pay for Housing in the Last Year: No     Number of Places Lived in the Last Year: 1     Unstable Housing in the Last Year: No       Current medications:   Current Outpatient Medications   Medication    metoprolol tartrate (LOPRESSOR) 25 MG Tab    Ascorbic Acid (VITAMIN C PO)    Cholecalciferol (D3 PO)    Omega-3 Fatty Acids (FISH OIL  "PO)    cyanocobalamin (VITAMIN B12) 1000 MCG Tab    carbidopa-levodopa (SINEMET)  MG Tab    apixaban (ELIQUIS) 5mg Tab     No current facility-administered medications for this visit.       Medication Allergy:  Allergies   Allergen Reactions    Cat Hair Extract Shortness of Breath, Runny Nose and Itching     \"wheezing\"       Review of systems:   Constitutional: denies fever, night sweats, weight loss.   Eyes: denies acute vision change, eye pain or secretion.   Ears, Nose, Mouth, Throat: denies nasal secretion, nasal bleeding, difficulty swallowing, hearing loss, tinnitus, vertigo, ear pain, acute dental problems, oral ulcers or lesions.   Endocrine: denies recent weight changes, heat or cold intolerance, polyuria, polydypsia, polyphagia,abnormal hair growth.  Cardiovascular: denies new onset of chest pain, palpitations, syncope, or dyspnea of exertion.  Pulmonary: denies shortness of breath, new onset of cough, hemoptysis, wheezing, chest pain or flu-like symptoms.   GI: denies nausea, vomiting, diarrhea, GI bleeding, change in appetite, abdominal pain, and change in bowel habits.  : denies dysuria, urinary incontinence, hematuria.  Heme/oncology: denies history of easy bruising or bleeding. No history of cancer, DVTor PE.  Allergy/immunology: denies hives/urticaria, or itching.   Dermatologic: denies new rash, or new skin lesions.  Musculoskeletal:denies joint swelling or pain, muscle pain, neck and back pain. Neurologic: denies headaches, acute visual changes, facial droopiness, muscle weakness (focal or generalized), paresthesias, anesthesia, ataxia, change in speech or language, memory loss, abnormal movements, seizures, loss of consciousness, or episodes of confusion.   Psychiatric: denies symptoms of depression, anxiety, hallucinations, mood swings or changes, suicidal or homicidal thoughts.     Physical examination:   Vitals:    12/09/24 1333   BP: 118/58   BP Location: Left arm   Patient Position: " "Sitting   BP Cuff Size: Adult   Pulse: 67   Temp: 36.1 °C (97 °F)   TempSrc: Temporal   SpO2: 96%   Weight: 84.5 kg (186 lb 4.6 oz)   Height: 1.778 m (5' 10\")     General: Patient in no acute distress, pleasant and cooperative.  HEENT: Normocephalic, no signs of acute trauma.   Neck: supple, no meningeal signs or carotid bruits. There is normal range of motion. No tenderness on exam.   Chest: clear to auscultation. No cough.   CV: RRR, no murmurs.   Skin: no signs of acute rashes or trauma.   Musculoskeletal: joints exhibit full range of motion, without any pain to palpation. There are no signs of joint or muscle swelling. There is no tenderness to deep palpation of muscles.   Psychiatric: No hallucinatory behavior. Denies symptoms of depression or suicidal ideation. Mood and affect appear normal on exam.     NEUROLOGICAL EXAM:   Mental status, orientation: Awake, alert and fully oriented.   Speech and language: speech is clear and fluent. The patient is able to name, repeat and comprehend.   Memory: There is intact recollection of recent and remote events.   Cranial nerve exam: Pupils are 3-4 mm bilaterally and equally reactive to light and accommodation. Visual fields are intact by confrontation. Fundoscopic exam was unremarkable. There is no nystagmus on primary or secondary gaze. Intact full EOM in all directions of gaze. Face appears symmetric. Sensation in the face is intact to light touch. Uvula is midline. Palate elevates symmetrically. Tongue is midline and without any signs of tongue biting or fasciculations. Sternocleidomastoid muscles exhibit is normal strength bilaterally. Shoulder shrug is intact bilaterally.   Motor exam: Strength is 5-/5 in all extremities. Tone is abnormal. Patient has resting tremor with some rigidity  Sensory exam reveals normal sense of light touch, proprioception, vibration and pinprick in all extremities.   Deep tendon reflexes:  2+ throughout. Plantar responses are flexor. " There is no clonus.   Coordination: shows a normal finger-nose-finger. Normal rapidly alternating movements.   Gait: The patient was able to get up from seated position on first attempt without requiring assistance. Found to be steady when walking. Movements were fluid with normal arm swing. The patient was able to turn without difficulties or tendency to fall. Romberg examination positive      ANCILLARY DATA REVIEWED:     Lab Data Review:  No results found for this or any previous visit (from the past 24 hours).    Records reviewed: Intervening records from his cardiac ablation and cardiologist follow-up note.  I also reviewed his records from the fall he took including ER records      Imaging: Plan as below within the PACS system and with the patient.      Details    Reading Physician Reading Date Result Priority   Geoffrey Carter M.D.  [phone] 5/23/2024      Narrative & Impression     5/23/2024 9:06 PM     HISTORY/REASON FOR EXAM: Head Injury     TECHNIQUE/EXAM DESCRIPTION:  CT of the head without contrast.     Sequential axial images were obtained from the vertex to the skull base without contrast.     Up to date radiation dose reduction adjustments have been utilized to meet ALARA standards for radiation dose reduction.     COMPARISON: None     FINDINGS:     The brain appears normal in volume and morphology. The ventricles are normal in caliber and configuration. No space occupying lesions or areas of acute vascular territory infarctions are identified. There are no abnormal extra axial fluid collections or   extra axial hemorrhage identified.     Mild bilateral maxillary and ethmoid sinus mucosal thickening is seen. No depressed calvarial fractures are identified. The visualized globes and retrobulbar soft tissues appear within normal limits.  Atherosclerotic intracranial calcifications are seen.     IMPRESSION:        1.  No acute intracranial abnormality.  2.  Mild bilateral maxillary and ethmoid  sinusitis changes  3.  Atherosclerosis.         ASSESSMENT AND PLAN:    1. Parkinson's disease, unspecified whether dyskinesia present, unspecified whether manifestations fluctuate (HCC)  Plan to continue with the current dose of carbidopa/levodopa but patient is going to try a later time schedule to see if that lasts longer into the afternoon as he is feeling pretty good in the early am with his endogenous dopamine. Pt does his physical work in the morning. Pt has noticed voice changes and mild swallow issues. He and I discussed a speech therapy referral to [facility] and he will review handout and let me know if he would like to proceed with the referral.discussed.  - Patient identified as fall risk.  Appropriate orders and counseling given.    2. Risk for falls  We discussed cane options and patient will keep in mind for the future but he does not think he needs this yet.  - Patient identified as fall risk.  Appropriate orders and counseling given.    3. Fall, sequela  Pt has recovered from the fall earlier this year.  - Patient identified as fall risk.  Appropriate orders and counseling given.    4. Tobacco use  Past use but no longer.    5. Ocular migraine  Pt has been stable and he ha no other concerning features of the ocular changes    6. Vitamin B12 deficiency  Pt is taking his daily supplements.        FOLLOW-UP:   Return in about 6 months (around 6/9/2025).    My total time spent caring for the patient on the day of the encounter was 55 minutes.   This does not include time spent on separately billable procedures/tests.       EDUCATION AND COUNSELING:  -Discussed regular exercise program and prevention of cardiovascular disease, including stroke.   -Discussed healthy lifestyle, including: healthy diet (rich in fruits, vegetables, nuts and healthy oils); proper hydration, and adequate sleep hygiene (allowing 7-8 hrs of overnight sleep).        Melissa Bloch, MD  Clinical  of Neurology  [facility].   Diplomate in Neurology.   Office: [phone]  Fax: [phone]

## 2024-12-09 NOTE — ASSESSMENT & PLAN NOTE
Pt does work or go the gym everyday. Pt states that if he misses medication he notices he can tell that the tremors increase and are slightly worse on the right side. Patient states his writing has changed. Pt states the finer detail is out the window. Pt states that he feels like things are slowly getting worse. Pt states he has trouble with dry fibrous food. Pt states that he feels like his voice and weak and scratchy. Pt states that he has had some issues from previous throat cancer from 20 years ago. Pt states that he takes it 10 minutes before his mealtime except the last dose he takes later in the evening. He takes it at 6 am,12 pm and then at 9pm and he goes to bed at 10pm. He gets up to use the bathroom at night and he is shaky but he manages.Pt states he has mild name finding issues and short term memory loss at times but this has not been a rapid progression of his symptoms.

## 2025-01-24 DIAGNOSIS — G20.A2 PARKINSON'S DISEASE WITHOUT DYSKINESIA, WITH FLUCTUATING MANIFESTATIONS (HCC): ICD-10-CM

## 2025-01-27 RX ORDER — CARBIDOPA AND LEVODOPA 25; 100 MG/1; MG/1
TABLET ORAL
Qty: 540 TABLET | Refills: 3 | Status: SHIPPED | OUTPATIENT
Start: 2025-01-27

## 2025-02-24 ENCOUNTER — OFFICE VISIT (OUTPATIENT)
Dept: CARDIOLOGY | Facility: MEDICAL CENTER | Age: 85
End: 2025-02-24
Attending: STUDENT IN AN ORGANIZED HEALTH CARE EDUCATION/TRAINING PROGRAM
Payer: MEDICARE

## 2025-02-24 VITALS
DIASTOLIC BLOOD PRESSURE: 90 MMHG | OXYGEN SATURATION: 97 % | SYSTOLIC BLOOD PRESSURE: 142 MMHG | BODY MASS INDEX: 26.92 KG/M2 | HEIGHT: 70 IN | RESPIRATION RATE: 16 BRPM | HEART RATE: 73 BPM | WEIGHT: 188 LBS

## 2025-02-24 DIAGNOSIS — Z86.79 S/P ABLATION OF ATRIAL FIBRILLATION: ICD-10-CM

## 2025-02-24 DIAGNOSIS — C10.9 MALIGNANT NEOPLASM OF OROPHARYNX (HCC): ICD-10-CM

## 2025-02-24 DIAGNOSIS — Z98.890 S/P ABLATION OF ATRIAL FIBRILLATION: ICD-10-CM

## 2025-02-24 DIAGNOSIS — Z91.81 RISK FOR FALLS: ICD-10-CM

## 2025-02-24 DIAGNOSIS — I48.0 PAF (PAROXYSMAL ATRIAL FIBRILLATION) (HCC): ICD-10-CM

## 2025-02-24 LAB — EKG IMPRESSION: NORMAL

## 2025-02-24 PROCEDURE — 99212 OFFICE O/P EST SF 10 MIN: CPT | Performed by: STUDENT IN AN ORGANIZED HEALTH CARE EDUCATION/TRAINING PROGRAM

## 2025-02-24 PROCEDURE — 3077F SYST BP >= 140 MM HG: CPT | Performed by: STUDENT IN AN ORGANIZED HEALTH CARE EDUCATION/TRAINING PROGRAM

## 2025-02-24 PROCEDURE — 99214 OFFICE O/P EST MOD 30 MIN: CPT | Performed by: STUDENT IN AN ORGANIZED HEALTH CARE EDUCATION/TRAINING PROGRAM

## 2025-02-24 PROCEDURE — 3080F DIAST BP >= 90 MM HG: CPT | Performed by: STUDENT IN AN ORGANIZED HEALTH CARE EDUCATION/TRAINING PROGRAM

## 2025-02-24 PROCEDURE — 93005 ELECTROCARDIOGRAM TRACING: CPT | Mod: TC | Performed by: STUDENT IN AN ORGANIZED HEALTH CARE EDUCATION/TRAINING PROGRAM

## 2025-02-24 ASSESSMENT — FIBROSIS 4 INDEX: FIB4 SCORE: 3.96

## 2025-02-24 NOTE — PROGRESS NOTES
Arrhythmia Clinic Note (Established EP patient)    DOS: 02/24/2025      Interval History:    83 years old gentleman with medical history of paroxysmal atrial fibrillation, hypertension, Parkinson's disease has followed EP team for atrial fibrillation management. He has followed EP Dr. Pickett prior.  He has paroxysmal atrial fibrillation with episodes once over 1 to 3 years.  He required cardioversion due to atrial fibrillation rapid ventricular response with symptoms of palpitations, shortness of breath.  He was prescribed flecainide pill in the pocket earlier for atrial fibrillation spikes.  On 9/8/2024, he reports recurrence of atrial fibrillation with RVR then he took flecainide and had a syncope episode.  His echocardiogram and stress test were unremarkable.  Flecainide was discontinued.  He tolerates metoprolol and Eliquis.      He underwent successful atrial fibrillation/atrial flutter ablation on 11/22/2024.  He reports occasional palpitation for seconds without dizziness, chest pain, shortness of breath, syncope.  He maintained sinus rhythm currently.    ROS (+ highlighted in red):  General--Negative for fatigue, weight loss or weight gain  Cardiovascular--occasional palpitations, Negative for CP, orthopnea, PND, syncope    Past Medical History:   Diagnosis Date    Arthritis     bilateral knees    Bronchitis 01/2017    Cancer ([facility]) 2003    throat Squamous Cell, with chemo/radiation, lymph nodes removal    Cataract     bilateral IOL    PAF (paroxysmal atrial fibrillation) ([facility])     denies taking any meds, cardioversion last one was 2017, HX of SVT    Parkinson disease ([facility])     declines to take meds    Pneumonia 05/2016    SVT (supraventricular tachycardia) ([facility])     denies taking any meds,cardioversion       Past Surgical History:   Procedure Laterality Date    BLEPHAROPLASTY Bilateral 07/29/2019    Procedure: BLEPHAROPLASTY- UPPER;  Surgeon: Jos Baron M.D.;  Location: SURGERY [facility];   Service: Plastics    BROW LIFT Right 2019    Procedure: RHYTIDECTOMY, FOREHEAD- DIRECT BROW LIFT;  Surgeon: Jos Baron M.D.;  Location: SURGERY [facility];  Service: Plastics    INGUINAL HERNIA REPAIR Bilateral 2017    Procedure: INGUINAL HERNIA REPAIR W/MESH;  Surgeon: Michael Malin M.D.;  Location: SURGERY SAME DAY [facility];  Service:     UMBILICAL HERNIA REPAIR N/A 2017    Procedure: UMBILICAL HERNIA REPAIR W/MESH;  Surgeon: Michael Malin M.D.;  Location: SURGERY SAME DAY [facility];  Service:     NECK DISSECTION  2003    HIP ARTHROPLASTY TOTAL Right     Hip Replacement, Total    SHOULDER ARTHROSCOPY W/ ROTATOR CUFF REPAIR Right     ORIF, FRACTURE, FEMUR Left     ORIF, Femur    INGUINAL HERNIA REPAIR Left     OTHER CARDIAC SURGERY      reports has had 6 cardioversions       Social History     Socioeconomic History    Marital status:      Spouse name: Not on file    Number of children: Not on file    Years of education: Not on file    Highest education level: Not on file   Occupational History    Not on file   Tobacco Use    Smoking status: Former     Current packs/day: 0.00     Average packs/day: 1 pack/day for 10.0 years (10.0 ttl pk-yrs)     Types: Cigarettes     Start date: 1977     Quit date: 1987     Years since quittin.1    Smokeless tobacco: Never   Vaping Use    Vaping status: Never Used   Substance and Sexual Activity    Alcohol use: Yes     Alcohol/week: 1.2 oz     Types: 2 Glasses of wine per week     Comment: occ    Drug use: Never    Sexual activity: Not on file   Other Topics Concern    Not on file   Social History Narrative    Not on file     Social Drivers of Health     Financial Resource Strain: Not on file   Food Insecurity: No Food Insecurity (2024)    Hunger Vital Sign     Worried About Running Out of Food in the Last Year: Never true     Ran Out of Food in the Last Year: Never true   Transportation Needs: No  "Transportation Needs (5/24/2024)    PRAPARE - Transportation     Lack of Transportation (Medical): No     Lack of Transportation (Non-Medical): No   Physical Activity: Not on file   Stress: Not on file   Social Connections: Not on file   Intimate Partner Violence: Not At Risk (5/24/2024)    Humiliation, Afraid, Rape, and Kick questionnaire     Fear of Current or Ex-Partner: No     Emotionally Abused: No     Physically Abused: No     Sexually Abused: No   Housing Stability: Low Risk  (5/24/2024)    Housing Stability Vital Sign     Unable to Pay for Housing in the Last Year: No     Number of Places Lived in the Last Year: 1     Unstable Housing in the Last Year: No       Family History   Problem Relation Age of Onset    Cancer Mother     Cancer Father     Cancer Brother        Allergies   Allergen Reactions    Cat Hair Extract Shortness of Breath, Runny Nose and Itching     \"wheezing\"       Current Outpatient Medications   Medication Sig Dispense Refill    carbidopa-levodopa (SINEMET)  MG Tab TAKE 2 TABLETS THREE TIMES DAILY 540 Tablet 3    metoprolol tartrate (LOPRESSOR) 25 MG Tab Take 1-2 Tablets by mouth 1 time a day as needed (For atrial fibrillation). 60 Tablet 0    apixaban (ELIQUIS) 5mg Tab Take 1 Tablet by mouth 2 times a day. 60 Tablet 3    Ascorbic Acid (VITAMIN C PO) Take 1 Tablet by mouth every day.     MEDICATION INSTRUCTIONS FOR SURGERY/PROCEDURE SCHEDULED FOR 11/22/2024   DO NOT TAKE 7 DAYS PRIOR TO SURGERY      Cholecalciferol (D3 PO) Take 1 Capsule by mouth every day.     MEDICATION INSTRUCTIONS FOR SURGERY/PROCEDURE SCHEDULED FOR 11/22/2024   DO NOT TAKE 7 DAYS PRIOR TO SURGERY      Omega-3 Fatty Acids (FISH OIL PO) Take 1 Capsule by mouth every day.     MEDICATION INSTRUCTIONS FOR SURGERY/PROCEDURE SCHEDULED FOR 11/22/2024   DO NOT TAKE 7 DAYS PRIOR TO SURGERY      cyanocobalamin (VITAMIN B12) 1000 MCG Tab Take 1 Tablet by mouth every day.       No current facility-administered medications for " "this visit.       Physical Exam:  Vitals:    02/24/25 0934   BP: (!) 142/90   BP Location: Left arm   Patient Position: Sitting   BP Cuff Size: Adult   Pulse: 73   Resp: 16   SpO2: 97%   Weight: 85.3 kg (188 lb)   Height: 1.778 m (5' 10\")     General appearance: NAD, conversant  Eyes: anicteric sclerae, no lid-lag; PERRLA  HENT: Atraumatic; moist mucous membranes, no ulcerations  Neck: Trachea midline; FROM, supple, no thyromegaly  Lungs: CTA, with normal respiratory effort and no intercostal retractions  CV: RRR, no MRGs, no JVD  Abdomen: Soft, non-tender; normal bowel sounds, no HSM  Extremities: No peripheral edema. No clubbing or cyanosis.  Skin: Normal temperature, turgor and texture; no rash or ulcers  Psych: Appropriate affect, alert and oriented to person, place and time    Data:  Labs reviewed:  Hgb 14.5, Cr 0.9    Prior echo/stress reviewed:  Preserved LVEF, no significant valvular disease, unremarkable nuclear stress test.    EKG interpreted by me:  SR    Atrial fibrillation on 11/22/2024  - PFA, PVC/PWI/mitrial isthmus line  - RF, CTI     Impression/Plan:  1. PAF (paroxysmal atrial fibrillation) ([facility])  EKG      2.  Parkinson's disease    -e maintained sinus rhythm, I encouraged him back to regular exercise.  -e tolerates Eliquis at this point. If he has worsening balance issues, we may consider LAAO  - I will see him in one year    Lamin Pelletier MD, PhD  Cardiac Electrophysiologist   "

## 2025-03-07 ENCOUNTER — TELEPHONE (OUTPATIENT)
Dept: CARDIOLOGY | Facility: MEDICAL CENTER | Age: 85
End: 2025-03-07
Payer: MEDICARE

## 2025-03-07 DIAGNOSIS — I48.0 PAROXYSMAL ATRIAL FIBRILLATION (HCC): ICD-10-CM

## 2025-03-07 RX ORDER — APIXABAN 5 MG/1
5 TABLET, FILM COATED ORAL 2 TIMES DAILY
Qty: 180 TABLET | Refills: 2 | Status: SHIPPED | OUTPATIENT
Start: 2025-03-07

## 2025-03-07 NOTE — TELEPHONE ENCOUNTER
Received Renewal PA request via [employer]  for apixaban (ELIQUIS) 5mg Tab . (Quantity:180, Day Supply:90)     Insurance: [employer]  Member ID:  [id number]  BIN: [id number]  PCN: [id number]  Group: [id number]     Ran Test claim via [place] & medication  and does not need a PA. Due to pt having [employer] I can not get a copay. Will release to preferred pharmacy [facility] [address]

## 2025-03-12 LAB — EKG IMPRESSION: NORMAL

## 2025-03-19 LAB — EKG IMPRESSION: NORMAL

## 2025-04-22 ENCOUNTER — HOSPITAL ENCOUNTER (OUTPATIENT)
Dept: LAB | Facility: MEDICAL CENTER | Age: 85
End: 2025-04-22
Attending: FAMILY MEDICINE
Payer: MEDICARE

## 2025-04-22 LAB
ALBUMIN SERPL BCP-MCNC: 4.3 G/DL (ref 3.2–4.9)
ALBUMIN/GLOB SERPL: 1.3 G/DL
ALP SERPL-CCNC: 84 U/L (ref 30–99)
ALT SERPL-CCNC: 6 U/L (ref 2–50)
ANION GAP SERPL CALC-SCNC: 13 MMOL/L (ref 7–16)
AST SERPL-CCNC: 22 U/L (ref 12–45)
BASOPHILS # BLD AUTO: 0.7 % (ref 0–1.8)
BASOPHILS # BLD: 0.04 K/UL (ref 0–0.12)
BILIRUB SERPL-MCNC: 1 MG/DL (ref 0.1–1.5)
BUN SERPL-MCNC: 11 MG/DL (ref 8–22)
CALCIUM ALBUM COR SERPL-MCNC: 9.4 MG/DL (ref 8.5–10.5)
CALCIUM SERPL-MCNC: 9.6 MG/DL (ref 8.5–10.5)
CHLORIDE SERPL-SCNC: 99 MMOL/L (ref 96–112)
CHOLEST SERPL-MCNC: 189 MG/DL (ref 100–199)
CO2 SERPL-SCNC: 25 MMOL/L (ref 20–33)
CREAT SERPL-MCNC: 0.94 MG/DL (ref 0.5–1.4)
EOSINOPHIL # BLD AUTO: 0.21 K/UL (ref 0–0.51)
EOSINOPHIL NFR BLD: 3.9 % (ref 0–6.9)
ERYTHROCYTE [DISTWIDTH] IN BLOOD BY AUTOMATED COUNT: 43.9 FL (ref 35.9–50)
FASTING STATUS PATIENT QL REPORTED: NORMAL
GFR SERPLBLD CREATININE-BSD FMLA CKD-EPI: 81 ML/MIN/1.73 M 2
GLOBULIN SER CALC-MCNC: 3.2 G/DL (ref 1.9–3.5)
GLUCOSE SERPL-MCNC: 127 MG/DL (ref 65–99)
HCT VFR BLD AUTO: 46.6 % (ref 42–52)
HDLC SERPL-MCNC: 62 MG/DL
HGB BLD-MCNC: 15.1 G/DL (ref 14–18)
IMM GRANULOCYTES # BLD AUTO: 0.03 K/UL (ref 0–0.11)
IMM GRANULOCYTES NFR BLD AUTO: 0.6 % (ref 0–0.9)
LDLC SERPL CALC-MCNC: 119 MG/DL
LYMPHOCYTES # BLD AUTO: 1.32 K/UL (ref 1–4.8)
LYMPHOCYTES NFR BLD: 24.5 % (ref 22–41)
MCH RBC QN AUTO: 29.1 PG (ref 27–33)
MCHC RBC AUTO-ENTMCNC: 32.4 G/DL (ref 32.3–36.5)
MCV RBC AUTO: 89.8 FL (ref 81.4–97.8)
MONOCYTES # BLD AUTO: 0.35 K/UL (ref 0–0.85)
MONOCYTES NFR BLD AUTO: 6.5 % (ref 0–13.4)
NEUTROPHILS # BLD AUTO: 3.43 K/UL (ref 1.82–7.42)
NEUTROPHILS NFR BLD: 63.8 % (ref 44–72)
NRBC # BLD AUTO: 0 K/UL
NRBC BLD-RTO: 0 /100 WBC (ref 0–0.2)
PLATELET # BLD AUTO: 244 K/UL (ref 164–446)
PMV BLD AUTO: 14 FL (ref 9–12.9)
POTASSIUM SERPL-SCNC: 4.8 MMOL/L (ref 3.6–5.5)
PROT SERPL-MCNC: 7.5 G/DL (ref 6–8.2)
RBC # BLD AUTO: 5.19 M/UL (ref 4.7–6.1)
SODIUM SERPL-SCNC: 137 MMOL/L (ref 135–145)
TRIGL SERPL-MCNC: 39 MG/DL (ref 0–149)
WBC # BLD AUTO: 5.4 K/UL (ref 4.8–10.8)

## 2025-04-22 PROCEDURE — 85025 COMPLETE CBC W/AUTO DIFF WBC: CPT

## 2025-04-22 PROCEDURE — 36415 COLL VENOUS BLD VENIPUNCTURE: CPT

## 2025-04-22 PROCEDURE — 80061 LIPID PANEL: CPT | Mod: GA

## 2025-04-22 PROCEDURE — 80053 COMPREHEN METABOLIC PANEL: CPT

## (undated) DEVICE — SUTURE 3-0 VICRYL PLUS SH - 8X 18 INCH (12/BX)

## (undated) DEVICE — LACTATED RINGERS INJ 1000 ML - (14EA/CA 60CA/PF)

## (undated) DEVICE — KIT ANESTHESIA W/CIRCUIT & 3/LT BAG W/FILTER (20EA/CA)

## (undated) DEVICE — WATER IRRIGATION STERILE 1000ML (12EA/CA)

## (undated) DEVICE — SPONGE GAUZESTER 4 X 4 4PLY - (128PK/CA)

## (undated) DEVICE — TUBE E-T HI-LO CUFF 7.5MM (10EA/PK)

## (undated) DEVICE — DRAPE LARGE 3 QUARTER - (20/CA)

## (undated) DEVICE — SPANDAGE SZ 6 ELASTIC NET - (25YD/CA)

## (undated) DEVICE — GLOVE SZ 6 BIOGEL PI MICRO - PF LF (50PR/BX 4BX/CA)

## (undated) DEVICE — SUTURE GENERAL

## (undated) DEVICE — BLADE SURGICAL #10 - (50/BX)

## (undated) DEVICE — SUTURE 5-0 PROLENE P-3 - (12/BX)

## (undated) DEVICE — CLOSURE SKIN STRIP 1/2 X 4 IN - (STERI STRIP) (50/BX 4BX/CA)

## (undated) DEVICE — NEPTUNE 4 PORT MANIFOLD - (20/PK)

## (undated) DEVICE — SODIUM CHL IRRIGATION 0.9% 1000ML (12EA/CA)

## (undated) DEVICE — SUTURE 0 PROLENE CT C/R - (12/BX)

## (undated) DEVICE — CUSHION EAR E-Z WRAP NASAL CANNULA - (25/CA)

## (undated) DEVICE — KIT ROOM DECONTAMINATION

## (undated) DEVICE — DRESSING TRANSPARENT FILM TEGADERM 4 X 4.75" (50EA/BX)"

## (undated) DEVICE — SET LEADWIRE 5 LEAD BEDSIDE DISPOSABLE ECG (1SET OF 5/EA)

## (undated) DEVICE — ELECTRODE 850 FOAM ADHESIVE - HYDROGEL RADIOTRNSPRNT (50/PK)

## (undated) DEVICE — TUBE CONNECTING SUCTION - CLEAR PLASTIC STERILE 72 IN (50EA/CA)

## (undated) DEVICE — GLOVE BIOGEL SZ 7.5 SURGICAL PF LTX - (50PR/BX 4BX/CA)

## (undated) DEVICE — CANISTER SUCTION RIGID RED 1500CC (40EA/CA)

## (undated) DEVICE — BLADE SURGICAL #15 - (50/BX 3BX/CA)

## (undated) DEVICE — DRAPE SURGICAL U 77X120 - (10/CA)

## (undated) DEVICE — SUTURE 2-0 PDS II CT-2 - (36/BX)

## (undated) DEVICE — DRAPE LAPAROTOMY T SHEET - (12EA/CA)

## (undated) DEVICE — SPONGE XRAY 8X4 STERL. 12PL - (10EA/TY 80TY/CA)

## (undated) DEVICE — SYRINGE 10 ML CONTROL LL (25EA/BX 4BX/CA)

## (undated) DEVICE — LEAD SET 6 DISP. EKG NIHON KOHDEN

## (undated) DEVICE — SENSOR SPO2 NEO LNCS ADHESIVE (20/BX) SEE USER NOTES

## (undated) DEVICE — SUTURE 5-0 MONOCRYL PLUS P-3 - 18 INCH (12/BX)

## (undated) DEVICE — PAD EYE GAUZE COVERED OVAL 1 5/8 X 2 5/8" STERILE"

## (undated) DEVICE — SUCTION INSTRUMENT YANKAUER BULBOUS TIP W/O VENT (50EA/CA)

## (undated) DEVICE — ELECTRODE DUAL RETURN W/ CORD - (50/PK)

## (undated) DEVICE — GLOVE BIOGEL SZ 8 SURGICAL PF LTX - (50PR/BX 4BX/CA)

## (undated) DEVICE — BOVIE NEEDLE TIP INSULATD NON-SAFETY 2CM (50/PK)

## (undated) DEVICE — GLOVE, LITE (PAIR)

## (undated) DEVICE — BAG, SPONGE COUNT 50600

## (undated) DEVICE — MASK ANESTHESIA ADULT  - (100/CA)

## (undated) DEVICE — PROTECTOR ULNA NERVE - (36PR/CA)

## (undated) DEVICE — PACK MINOR BASIN - (2EA/CA)

## (undated) DEVICE — TUBING CLEARLINK DUO-VENT - C-FLO (48EA/CA)

## (undated) DEVICE — STERI STRIP COMPOUND BENZOIN - TINCTURE 0.6ML WITH APPLICATOR (40EA/BX)

## (undated) DEVICE — CATHETER IV 20 GA X 1-1/4 ---SURG.& SDS ONLY--- (50EA/BX)

## (undated) DEVICE — BANDAGE TENSOPLAST 3 X 5 YDS - (1/EA)

## (undated) DEVICE — BLADE SURGICAL CLIPPER - (50EA/CA)

## (undated) DEVICE — HUMID-VENT HEAT AND MOISTURE EXCHANGE- (50/BX)

## (undated) DEVICE — SUTURE 4-0 VICRYL PLUS FS-2 - 27 INCH (36/BX)

## (undated) DEVICE — HEAD HOLDER JUNIOR/ADULT

## (undated) DEVICE — GLOVE BIOGEL INDICATOR SZ 8 SURGICAL PF LTX - (50/BX 4BX/CA)

## (undated) DEVICE — DRAIN PENROSE STERILE 1/4 X - 18 IN  (25EA/BX)

## (undated) DEVICE — GOWN WARMING STANDARD FLEX - (30/CA)

## (undated) DEVICE — SUTURE 2-0 VICRYL PLUS CT-2 - 27 INCH (36/BX)

## (undated) DEVICE — KIT  I.V. START (100EA/CA)

## (undated) DEVICE — SUTURE 6-0 PROLENE P-3 - (12/BX)

## (undated) DEVICE — NEEDLE NON SAFETY 27GA X 1-1/4 IN HYPO (100EA/BX)

## (undated) DEVICE — TRAY SKIN SCRUB PVP WET (20EA/CA) PART #DYND70356 DISCONTINUED

## (undated) DEVICE — SUTURE 2-0 COATED VICRYL PLUS - 12 X 18 INCH (12/BX)

## (undated) DEVICE — CHLORAPREP 26 ML APPLICATOR - ORANGE TINT(25/CA)